# Patient Record
Sex: FEMALE | Race: WHITE | Employment: FULL TIME | URBAN - METROPOLITAN AREA
[De-identification: names, ages, dates, MRNs, and addresses within clinical notes are randomized per-mention and may not be internally consistent; named-entity substitution may affect disease eponyms.]

---

## 2017-07-05 ENCOUNTER — OFFICE VISIT (OUTPATIENT)
Dept: FAMILY MEDICINE CLINIC | Age: 32
End: 2017-07-05

## 2017-07-05 VITALS
SYSTOLIC BLOOD PRESSURE: 120 MMHG | HEART RATE: 100 BPM | DIASTOLIC BLOOD PRESSURE: 82 MMHG | RESPIRATION RATE: 14 BRPM | OXYGEN SATURATION: 97 % | BODY MASS INDEX: 30.22 KG/M2 | WEIGHT: 177 LBS | HEIGHT: 64 IN

## 2017-07-05 DIAGNOSIS — H57.89 IRRITATION OF EYE: Primary | ICD-10-CM

## 2017-07-05 PROCEDURE — G8427 DOCREV CUR MEDS BY ELIG CLIN: HCPCS | Performed by: PHYSICIAN ASSISTANT

## 2017-07-05 PROCEDURE — 99212 OFFICE O/P EST SF 10 MIN: CPT | Performed by: PHYSICIAN ASSISTANT

## 2017-07-05 PROCEDURE — 4004F PT TOBACCO SCREEN RCVD TLK: CPT | Performed by: PHYSICIAN ASSISTANT

## 2017-07-05 PROCEDURE — G8417 CALC BMI ABV UP PARAM F/U: HCPCS | Performed by: PHYSICIAN ASSISTANT

## 2017-07-05 RX ORDER — SULFACETAMIDE SODIUM 100 MG/ML
2 SOLUTION/ DROPS OPHTHALMIC 3 TIMES DAILY
Qty: 1 BOTTLE | Refills: 0 | Status: SHIPPED | OUTPATIENT
Start: 2017-07-05 | End: 2017-07-15

## 2017-07-05 ASSESSMENT — ENCOUNTER SYMPTOMS
EYE PAIN: 1
BLURRED VISION: 0
EYE DISCHARGE: 1
DOUBLE VISION: 0
FOREIGN BODY SENSATION: 0
EYE REDNESS: 1

## 2019-02-01 ENCOUNTER — OFFICE VISIT (OUTPATIENT)
Dept: FAMILY MEDICINE CLINIC | Age: 34
End: 2019-02-01
Payer: COMMERCIAL

## 2019-02-01 VITALS
DIASTOLIC BLOOD PRESSURE: 84 MMHG | WEIGHT: 189 LBS | SYSTOLIC BLOOD PRESSURE: 118 MMHG | BODY MASS INDEX: 32.27 KG/M2 | OXYGEN SATURATION: 99 % | TEMPERATURE: 97.7 F | HEART RATE: 82 BPM | HEIGHT: 64 IN

## 2019-02-01 DIAGNOSIS — Z13.220 LIPID SCREENING: ICD-10-CM

## 2019-02-01 DIAGNOSIS — Z00.00 ENCOUNTER FOR PREVENTIVE HEALTH EXAMINATION: ICD-10-CM

## 2019-02-01 DIAGNOSIS — R19.02 ABDOMINAL WALL MASS OF LEFT UPPER QUADRANT: ICD-10-CM

## 2019-02-01 DIAGNOSIS — N92.6 ABNORMAL MENSES: ICD-10-CM

## 2019-02-01 DIAGNOSIS — E66.9 CLASS 1 OBESITY WITHOUT SERIOUS COMORBIDITY WITH BODY MASS INDEX (BMI) OF 32.0 TO 32.9 IN ADULT, UNSPECIFIED OBESITY TYPE: Primary | ICD-10-CM

## 2019-02-01 DIAGNOSIS — Z13.1 DIABETES MELLITUS SCREENING: ICD-10-CM

## 2019-02-01 DIAGNOSIS — B00.1 COLD SORE: ICD-10-CM

## 2019-02-01 DIAGNOSIS — Z83.3 FAMILY HISTORY OF DIABETES MELLITUS (DM): ICD-10-CM

## 2019-02-01 PROCEDURE — 1036F TOBACCO NON-USER: CPT | Performed by: NURSE PRACTITIONER

## 2019-02-01 PROCEDURE — G8427 DOCREV CUR MEDS BY ELIG CLIN: HCPCS | Performed by: NURSE PRACTITIONER

## 2019-02-01 PROCEDURE — G8484 FLU IMMUNIZE NO ADMIN: HCPCS | Performed by: NURSE PRACTITIONER

## 2019-02-01 PROCEDURE — 99213 OFFICE O/P EST LOW 20 MIN: CPT | Performed by: NURSE PRACTITIONER

## 2019-02-01 PROCEDURE — G8417 CALC BMI ABV UP PARAM F/U: HCPCS | Performed by: NURSE PRACTITIONER

## 2019-02-01 RX ORDER — ACYCLOVIR 50 MG/G
OINTMENT TOPICAL
Qty: 1 TUBE | Refills: 1 | Status: SHIPPED | OUTPATIENT
Start: 2019-02-01 | End: 2019-02-08

## 2019-02-01 RX ORDER — L. ACIDOPHILUS/BIFID. ANIMALIS 2B CELL
CAPSULE ORAL
COMMUNITY

## 2019-02-01 ASSESSMENT — PATIENT HEALTH QUESTIONNAIRE - PHQ9
SUM OF ALL RESPONSES TO PHQ QUESTIONS 1-9: 0
SUM OF ALL RESPONSES TO PHQ QUESTIONS 1-9: 0
2. FEELING DOWN, DEPRESSED OR HOPELESS: 0
SUM OF ALL RESPONSES TO PHQ9 QUESTIONS 1 & 2: 0
1. LITTLE INTEREST OR PLEASURE IN DOING THINGS: 0

## 2019-02-01 ASSESSMENT — ENCOUNTER SYMPTOMS
COUGH: 0
SHORTNESS OF BREATH: 0
ABDOMINAL PAIN: 0

## 2019-02-07 DIAGNOSIS — Z13.220 LIPID SCREENING: ICD-10-CM

## 2019-02-07 DIAGNOSIS — Z13.1 DIABETES MELLITUS SCREENING: ICD-10-CM

## 2019-02-07 DIAGNOSIS — Z83.3 FAMILY HISTORY OF DIABETES MELLITUS (DM): ICD-10-CM

## 2019-02-07 DIAGNOSIS — N92.6 ABNORMAL MENSES: ICD-10-CM

## 2019-02-07 DIAGNOSIS — Z00.00 ENCOUNTER FOR PREVENTIVE HEALTH EXAMINATION: ICD-10-CM

## 2019-02-07 DIAGNOSIS — E66.9 CLASS 1 OBESITY WITHOUT SERIOUS COMORBIDITY WITH BODY MASS INDEX (BMI) OF 32.0 TO 32.9 IN ADULT, UNSPECIFIED OBESITY TYPE: ICD-10-CM

## 2019-02-07 LAB
ALBUMIN SERPL-MCNC: 4.3 G/DL (ref 3.5–4.6)
ALP BLD-CCNC: 52 U/L (ref 40–130)
ALT SERPL-CCNC: 18 U/L (ref 0–33)
ANION GAP SERPL CALCULATED.3IONS-SCNC: 16 MEQ/L (ref 9–15)
AST SERPL-CCNC: 17 U/L (ref 0–35)
BASOPHILS ABSOLUTE: 0 K/UL (ref 0–0.2)
BASOPHILS RELATIVE PERCENT: 0.4 %
BILIRUB SERPL-MCNC: 0.3 MG/DL (ref 0.2–0.7)
BUN BLDV-MCNC: 18 MG/DL (ref 6–20)
CALCIUM SERPL-MCNC: 9.1 MG/DL (ref 8.5–9.9)
CHLORIDE BLD-SCNC: 102 MEQ/L (ref 95–107)
CHOLESTEROL, TOTAL: 134 MG/DL (ref 0–199)
CO2: 22 MEQ/L (ref 20–31)
CREAT SERPL-MCNC: 0.74 MG/DL (ref 0.5–0.9)
EOSINOPHILS ABSOLUTE: 0.1 K/UL (ref 0–0.7)
EOSINOPHILS RELATIVE PERCENT: 2.3 %
GFR AFRICAN AMERICAN: >60
GFR NON-AFRICAN AMERICAN: >60
GLOBULIN: 2.5 G/DL (ref 2.3–3.5)
GLUCOSE BLD-MCNC: 77 MG/DL (ref 70–99)
HBA1C MFR BLD: 5.5 % (ref 4.8–5.9)
HCT VFR BLD CALC: 40.4 % (ref 37–47)
HDLC SERPL-MCNC: 55 MG/DL (ref 40–59)
HEMOGLOBIN: 13.4 G/DL (ref 12–16)
LDL CHOLESTEROL CALCULATED: 55 MG/DL (ref 0–129)
LYMPHOCYTES ABSOLUTE: 1.7 K/UL (ref 1–4.8)
LYMPHOCYTES RELATIVE PERCENT: 32.8 %
MCH RBC QN AUTO: 28.8 PG (ref 27–31.3)
MCHC RBC AUTO-ENTMCNC: 33.1 % (ref 33–37)
MCV RBC AUTO: 86.9 FL (ref 82–100)
MONOCYTES ABSOLUTE: 0.3 K/UL (ref 0.2–0.8)
MONOCYTES RELATIVE PERCENT: 5.8 %
NEUTROPHILS ABSOLUTE: 3 K/UL (ref 1.4–6.5)
NEUTROPHILS RELATIVE PERCENT: 58.7 %
PDW BLD-RTO: 13.8 % (ref 11.5–14.5)
PLATELET # BLD: 165 K/UL (ref 130–400)
POTASSIUM SERPL-SCNC: 4.1 MEQ/L (ref 3.4–4.9)
RBC # BLD: 4.65 M/UL (ref 4.2–5.4)
SODIUM BLD-SCNC: 140 MEQ/L (ref 135–144)
TOTAL PROTEIN: 6.8 G/DL (ref 6.3–8)
TRIGL SERPL-MCNC: 119 MG/DL (ref 0–150)
TSH REFLEX: 1.61 UIU/ML (ref 0.44–3.86)
WBC # BLD: 5.2 K/UL (ref 4.8–10.8)

## 2019-02-08 ENCOUNTER — OFFICE VISIT (OUTPATIENT)
Dept: SURGERY | Age: 34
End: 2019-02-08
Payer: COMMERCIAL

## 2019-02-08 VITALS
DIASTOLIC BLOOD PRESSURE: 80 MMHG | SYSTOLIC BLOOD PRESSURE: 110 MMHG | TEMPERATURE: 99 F | WEIGHT: 190.2 LBS | HEIGHT: 64 IN | BODY MASS INDEX: 32.47 KG/M2

## 2019-02-08 DIAGNOSIS — D17.1 LIPOMA OF CHEST WALL: Primary | ICD-10-CM

## 2019-02-08 PROCEDURE — G8484 FLU IMMUNIZE NO ADMIN: HCPCS | Performed by: SURGERY

## 2019-02-08 PROCEDURE — 1036F TOBACCO NON-USER: CPT | Performed by: SURGERY

## 2019-02-08 PROCEDURE — G8417 CALC BMI ABV UP PARAM F/U: HCPCS | Performed by: SURGERY

## 2019-02-08 PROCEDURE — 99202 OFFICE O/P NEW SF 15 MIN: CPT | Performed by: SURGERY

## 2019-02-08 PROCEDURE — G8427 DOCREV CUR MEDS BY ELIG CLIN: HCPCS | Performed by: SURGERY

## 2019-02-08 ASSESSMENT — ENCOUNTER SYMPTOMS
ABDOMINAL DISTENTION: 0
RECTAL PAIN: 0
ABDOMINAL PAIN: 0
CHEST TIGHTNESS: 0
ALLERGIC/IMMUNOLOGIC NEGATIVE: 1
RHINORRHEA: 0
NAUSEA: 0
SHORTNESS OF BREATH: 0
BLOOD IN STOOL: 0
COLOR CHANGE: 0

## 2019-04-01 ENCOUNTER — PREP FOR PROCEDURE (OUTPATIENT)
Dept: SURGERY | Age: 34
End: 2019-04-01

## 2019-04-01 ENCOUNTER — HOSPITAL ENCOUNTER (OUTPATIENT)
Dept: PREADMISSION TESTING | Age: 34
Discharge: HOME | End: 2019-04-05
Payer: COMMERCIAL

## 2019-04-01 ENCOUNTER — OFFICE VISIT (OUTPATIENT)
Dept: SURGERY | Age: 34
End: 2019-04-01
Payer: COMMERCIAL

## 2019-04-01 VITALS
HEART RATE: 83 BPM | BODY MASS INDEX: 32.44 KG/M2 | HEIGHT: 64 IN | DIASTOLIC BLOOD PRESSURE: 80 MMHG | RESPIRATION RATE: 16 BRPM | WEIGHT: 190 LBS | SYSTOLIC BLOOD PRESSURE: 118 MMHG | OXYGEN SATURATION: 99 % | TEMPERATURE: 98 F

## 2019-04-01 VITALS
WEIGHT: 190 LBS | BODY MASS INDEX: 32.44 KG/M2 | HEIGHT: 64 IN | DIASTOLIC BLOOD PRESSURE: 80 MMHG | TEMPERATURE: 98 F | SYSTOLIC BLOOD PRESSURE: 118 MMHG

## 2019-04-01 DIAGNOSIS — D17.1 LIPOMA OF CHEST WALL: Primary | ICD-10-CM

## 2019-04-01 LAB
HCT VFR BLD CALC: 37 % (ref 37–47)
HEMOGLOBIN: 12.4 G/DL (ref 12–16)
MCH RBC QN AUTO: 29.2 PG (ref 27–31.3)
MCHC RBC AUTO-ENTMCNC: 33.6 % (ref 33–37)
MCV RBC AUTO: 86.8 FL (ref 82–100)
PDW BLD-RTO: 13.4 % (ref 11.5–14.5)
PLATELET # BLD: 181 K/UL (ref 130–400)
RBC # BLD: 4.26 M/UL (ref 4.2–5.4)
WBC # BLD: 5.3 K/UL (ref 4.8–10.8)

## 2019-04-01 PROCEDURE — 99213 OFFICE O/P EST LOW 20 MIN: CPT | Performed by: SURGERY

## 2019-04-01 PROCEDURE — 1036F TOBACCO NON-USER: CPT | Performed by: SURGERY

## 2019-04-01 PROCEDURE — G8427 DOCREV CUR MEDS BY ELIG CLIN: HCPCS | Performed by: SURGERY

## 2019-04-01 PROCEDURE — G8417 CALC BMI ABV UP PARAM F/U: HCPCS | Performed by: SURGERY

## 2019-04-01 PROCEDURE — 85027 COMPLETE CBC AUTOMATED: CPT

## 2019-04-01 RX ORDER — LIDOCAINE HYDROCHLORIDE 10 MG/ML
1 INJECTION, SOLUTION EPIDURAL; INFILTRATION; INTRACAUDAL; PERINEURAL
Status: CANCELLED | OUTPATIENT
Start: 2019-04-05 | End: 2019-04-05

## 2019-04-01 RX ORDER — SODIUM CHLORIDE 0.9 % (FLUSH) 0.9 %
10 SYRINGE (ML) INJECTION PRN
Status: CANCELLED | OUTPATIENT
Start: 2019-04-05

## 2019-04-01 RX ORDER — SODIUM CHLORIDE, SODIUM LACTATE, POTASSIUM CHLORIDE, CALCIUM CHLORIDE 600; 310; 30; 20 MG/100ML; MG/100ML; MG/100ML; MG/100ML
INJECTION, SOLUTION INTRAVENOUS CONTINUOUS
Status: CANCELLED | OUTPATIENT
Start: 2019-04-05

## 2019-04-01 RX ORDER — CEFAZOLIN SODIUM 2 G/50ML
2 SOLUTION INTRAVENOUS ONCE
Status: CANCELLED | OUTPATIENT
Start: 2019-04-05

## 2019-04-01 RX ORDER — SODIUM CHLORIDE 0.9 % (FLUSH) 0.9 %
10 SYRINGE (ML) INJECTION EVERY 12 HOURS SCHEDULED
Status: CANCELLED | OUTPATIENT
Start: 2019-04-05

## 2019-04-01 RX ORDER — KETOROLAC TROMETHAMINE 30 MG/ML
30 INJECTION, SOLUTION INTRAMUSCULAR; INTRAVENOUS ONCE
Status: CANCELLED | OUTPATIENT
Start: 2019-04-05 | End: 2019-04-09

## 2019-04-01 ASSESSMENT — ENCOUNTER SYMPTOMS
ALLERGIC/IMMUNOLOGIC NEGATIVE: 1
ABDOMINAL PAIN: 0
CHEST TIGHTNESS: 0
RHINORRHEA: 0
BLOOD IN STOOL: 0
RECTAL PAIN: 0
NAUSEA: 0
ABDOMINAL DISTENTION: 0
SHORTNESS OF BREATH: 0
COLOR CHANGE: 0

## 2019-04-01 NOTE — PROGRESS NOTES
Subjective:      Patient ID: Wayne Olivares is a 34 y.o. female. HPI  Wayne Olivares is a 34 y.o. female seen for a pre op visit for a soft tissue lesion of the left anterior rib cage. It has been there for 2+ years and has been getting larger. It is painful. The patient admits to infection/inflammation. There is not a history of previous surgery at this site. The patient  has similar lesion but smaller on the right side. Testing has been done with mammography and ultrasound that was normal.     Review of Systems   Constitutional: Negative for activity change, appetite change and unexpected weight change. HENT: Negative for congestion, nosebleeds, rhinorrhea and sneezing. Eyes: Negative for visual disturbance. Respiratory: Negative for chest tightness and shortness of breath. Cardiovascular: Negative for chest pain and leg swelling. Gastrointestinal: Negative for abdominal distention, abdominal pain, blood in stool, nausea and rectal pain. Endocrine: Negative. Genitourinary: Negative for difficulty urinating. Musculoskeletal: Negative. Skin: Negative for color change. Allergic/Immunologic: Negative. Neurological: Negative for seizures, light-headedness, numbness and headaches. Hematological: Does not bruise/bleed easily. Psychiatric/Behavioral: Negative for sleep disturbance. Past Medical History:   Diagnosis Date    GERD (gastroesophageal reflux disease)      Past Surgical History:   Procedure Laterality Date    OTHER SURGICAL HISTORY  2012    mole removal on back left side     WISDOM TOOTH EXTRACTION       Social History     Tobacco Use    Smoking status: Former Smoker     Packs/day: 0.25     Years: 9.00     Pack years: 2.25     Types: Cigarettes     Last attempt to quit: 2017     Years since quittin.6    Smokeless tobacco: Never Used    Tobacco comment: 4 cigs daily  2/4/15   Substance Use Topics    Alcohol use: Yes     Comment: occassional    Drug use:  No Family History   Problem Relation Age of Onset    High Blood Pressure Mother     No Known Problems Father     Cancer Paternal Aunt         rigoberto lymp    Heart Disease Maternal Grandfather         bi pass    Stroke Maternal Grandfather         8/2012 mini stroke    Diabetes Maternal Grandfather     Cancer Paternal Grandmother         lymphomia    Diabetes Paternal Grandfather      Dextromethorphan-guaifenesin  Allergies   Allergen Reactions    Dextromethorphan-Guaifenesin Other (See Comments)     Weak, shaky     Current Outpatient Medications   Medication Sig Dispense Refill    Multiple Vitamins-Minerals (ONE-A-DAY WOMENS VITACRAVES) CHEW Take by mouth      Lansoprazole (PREVACID PO) Take 1 tablet by mouth daily. No current facility-administered medications for this visit. /80   Temp 98 °F (36.7 °C) (Temporal)   Ht 5' 4\" (1.626 m)   Wt 190 lb (86.2 kg)   LMP 03/23/2019   BMI 32.61 kg/m²     Objective:   Physical Exam   Constitutional: She is oriented to person, place, and time. She appears well-developed and well-nourished. No distress. HENT:   Mouth/Throat: Oropharynx is clear and moist.   Eyes: Pupils are equal, round, and reactive to light. Neck: No tracheal deviation present. No thyromegaly present. Cardiovascular: Normal rate, regular rhythm and normal heart sounds. Pulmonary/Chest: Effort normal and breath sounds normal. No respiratory distress. Musculoskeletal:   Normal gait   Lymphadenopathy:     She has no cervical adenopathy. She has no axillary adenopathy. Right: No supraclavicular adenopathy present. Left: No supraclavicular adenopathy present. Neurological: She is alert and oriented to person, place, and time. Skin: No bruising, no lesion and no rash noted. Psychiatric: She has a normal mood and affect.  Judgment normal.     /80   Temp 98 °F (36.7 °C) (Temporal)   Ht 5' 4\" (1.626 m)   Wt 190 lb (86.2 kg)   LMP 03/23/2019   BMI 32.61 kg/m²   Assessment:      Left anterior chest wall lipoma      Plan:      Excision of left chest wall mass       The options of therapy were discussed. The procedure of the excision as well as potential risks and complications were discussed including but not exclusive to recurrence, infection, difficulty with wound healing and blood loss. She understands and is agreeable to the surgery.        Rudi Be MD

## 2019-04-02 NOTE — H&P (VIEW-ONLY)
GRANT Reyes is a 34 y.o. female seen for a pre op visit for a soft tissue lesion of the left anterior rib cage. It has been there for 2+ years and has been getting larger. It is painful. The patient admits to infection/inflammation. There is not a history of previous surgery at this site. The patient  has similar lesion but smaller on the right side. Testing has been done with mammography and ultrasound that was normal.     Review of Systems   Constitutional: Negative for activity change, appetite change and unexpected weight change. HENT: Negative for congestion, nosebleeds, rhinorrhea and sneezing. Eyes: Negative for visual disturbance. Respiratory: Negative for chest tightness and shortness of breath. Cardiovascular: Negative for chest pain and leg swelling. Gastrointestinal: Negative for abdominal distention, abdominal pain, blood in stool, nausea and rectal pain. Endocrine: Negative. Genitourinary: Negative for difficulty urinating. Musculoskeletal: Negative. Skin: Negative for color change. Allergic/Immunologic: Negative. Neurological: Negative for seizures, light-headedness, numbness and headaches. Hematological: Does not bruise/bleed easily. Psychiatric/Behavioral: Negative for sleep disturbance.      Past Medical History:   Diagnosis Date    GERD (gastroesophageal reflux disease)      Past Surgical History:   Procedure Laterality Date    OTHER SURGICAL HISTORY  2012    mole removal on back left side     WISDOM TOOTH EXTRACTION       Social History     Tobacco Use    Smoking status: Former Smoker     Packs/day: 0.25     Years: 9.00     Pack years: 2.25     Types: Cigarettes     Last attempt to quit: 2017     Years since quittin.6    Smokeless tobacco: Never Used    Tobacco comment: 4 cigs daily  2/4/15   Substance Use Topics    Alcohol use: Yes     Comment: occassional    Drug use: No     Family History   Problem Relation Age of Onset    High Blood Pressure Mother     No Known Problems Father     Cancer Paternal Aunt         rigoberto lymp    Heart Disease Maternal Grandfather         bi pass    Stroke Maternal Grandfather         8/2012 mini stroke    Diabetes Maternal Grandfather     Cancer Paternal Grandmother         lymphomia    Diabetes Paternal Grandfather      Dextromethorphan-guaifenesin  Allergies   Allergen Reactions    Dextromethorphan-Guaifenesin Other (See Comments)     Weak, shaky     Current Outpatient Medications   Medication Sig Dispense Refill    Multiple Vitamins-Minerals (ONE-A-DAY WOMENS VITACRAVES) CHEW Take by mouth      Lansoprazole (PREVACID PO) Take 1 tablet by mouth daily. No current facility-administered medications for this visit. /80   Temp 98 °F (36.7 °C) (Temporal)   Ht 5' 4\" (1.626 m)   Wt 190 lb (86.2 kg)   LMP 03/23/2019   BMI 32.61 kg/m²     Objective:   Physical Exam   Constitutional: She is oriented to person, place, and time. She appears well-developed and well-nourished. No distress. HENT:   Mouth/Throat: Oropharynx is clear and moist.   Eyes: Pupils are equal, round, and reactive to light. Neck: No tracheal deviation present. No thyromegaly present. Cardiovascular: Normal rate, regular rhythm and normal heart sounds. Pulmonary/Chest: Effort normal and breath sounds normal. No respiratory distress. Musculoskeletal:   Normal gait   Lymphadenopathy:     She has no cervical adenopathy. She has no axillary adenopathy. Right: No supraclavicular adenopathy present. Left: No supraclavicular adenopathy present. Neurological: She is alert and oriented to person, place, and time. Skin: No bruising, no lesion and no rash noted. Psychiatric: She has a normal mood and affect.  Judgment normal.     /80   Temp 98 °F (36.7 °C) (Temporal)   Ht 5' 4\" (1.626 m)   Wt 190 lb (86.2 kg)   LMP 03/23/2019   BMI 32.61 kg/m²   Assessment:      Left anterior chest wall lipoma      Plan:      Excision of left chest wall mass       The options of therapy were discussed. The procedure of the excision as well as potential risks and complications were discussed including but not exclusive to recurrence, infection, difficulty with wound healing and blood loss. She understands and is agreeable to the surgery.        Tracey Connell MD

## 2019-04-04 ENCOUNTER — ANESTHESIA EVENT (OUTPATIENT)
Dept: OPERATING ROOM | Age: 34
End: 2019-04-04
Payer: COMMERCIAL

## 2019-04-04 NOTE — ANESTHESIA PRE PROCEDURE
Department of Anesthesiology  Preprocedure Note       Name:  Ricco Ray   Age:  34 y.o.  :  1985                                          MRN:  [id number]         Date:  2019      Surgeon: Swetha Barrios):  Palma Vu MD    Procedure: EXCISION OF CHEST WALL MASS (N/A )    Medications prior to admission:   Prior to Admission medications    Medication Sig Start Date End Date Taking? Authorizing Provider   Multiple Vitamins-Minerals (ONE-A-DAY WOMENS VITACRAVES) CHEW Take by mouth    Historical Provider, MD   Lansoprazole (PREVACID PO) Take 1 tablet by mouth daily. Historical Provider, MD       Current medications:    Current Facility-Administered Medications   Medication Dose Route Frequency Provider Last Rate Last Dose    lactated ringers infusion   Intravenous Continuous Cristiana Page, APRN -  mL/hr at 19 0640      sodium chloride flush 0.9 % injection 10 mL  10 mL Intravenous 2 times per day Cristiana Page, APRN - CNP        sodium chloride flush 0.9 % injection 10 mL  10 mL Intravenous PRN Cristiana Page, APRN - CNP        ceFAZolin (ANCEF) 2 g in dextrose 3 % 50 mL IVPB (duplex)  2 g Intravenous Once Cristiana Page, APRN - CNP           Allergies:     Allergies   Allergen Reactions    Dextromethorphan-Guaifenesin Other (See Comments)     Weak, shaky       Problem List:    Patient Active Problem List   Diagnosis Code    Lipoma of chest wall D17.1       Past Medical History:        Diagnosis Date    GERD (gastroesophageal reflux disease)        Past Surgical History:        Procedure Laterality Date    OTHER SURGICAL HISTORY  2012    mole removal on back left side     WISDOM TOOTH EXTRACTION         Social History:    Social History     Tobacco Use    Smoking status: Former Smoker     Packs/day: 0.25     Years: 9.00     Pack years: 2.25     Types: Cigarettes     Last attempt to quit: 2017     Years since quittin.6    Smokeless tobacco: Never Used   [facility] Tobacco comment: 4 cigs daily  2/4/15   Substance Use Topics    Alcohol use: Yes     Comment: occassional                                Counseling given: Not Answered  Comment: 4 cigs daily  2/4/15      Vital Signs (Current):   Vitals:    04/05/19 0613   BP: 139/85   Pulse: 79   Resp: 16   Temp: 98 °F (36.7 °C)   TempSrc: Temporal   SpO2: 100%   Weight: 190 lb (86.2 kg)   Height: 5' 4\" (1.626 m)                                              BP Readings from Last 3 Encounters:   04/05/19 139/85   04/01/19 118/80   04/01/19 118/80       NPO Status: Time of last liquid consumption: 2130                        Time of last solid consumption: 2130                        Date of last liquid consumption: 04/04/19                        Date of last solid food consumption: 04/04/19    BMI:   Wt Readings from Last 3 Encounters:   04/05/19 190 lb (86.2 kg)   04/01/19 190 lb (86.2 kg)   04/01/19 190 lb (86.2 kg)     Body mass index is 32.61 kg/m². CBC:   Lab Results   Component Value Date    WBC 5.3 04/01/2019    RBC 4.26 04/01/2019    HGB 12.4 04/01/2019    HCT 37.0 04/01/2019    MCV 86.8 04/01/2019    RDW 13.4 04/01/2019     04/01/2019       CMP:   Lab Results   Component Value Date     02/07/2019    K 4.1 02/07/2019     02/07/2019    CO2 22 02/07/2019    BUN 18 02/07/2019    CREATININE 0.74 02/07/2019    GFRAA >60.0 02/07/2019    LABGLOM >60.0 02/07/2019    GLUCOSE 77 02/07/2019    PROT 6.8 02/07/2019    CALCIUM 9.1 02/07/2019    BILITOT 0.3 02/07/2019    ALKPHOS 52 02/07/2019    AST 17 02/07/2019    ALT 18 02/07/2019       POC Tests: No results for input(s): POCGLU, POCNA, POCK, POCCL, POCBUN, POCHEMO, POCHCT in the last 72 hours.     Coags: No results found for: PROTIME, INR, APTT    HCG (If Applicable): No results found for: PREGTESTUR, PREGSERUM, HCG, HCGQUANT     ABGs: No results found for: PHART, PO2ART, NMF7QUJ, EDW4KEE, BEART, M7QIABXH     Type & Screen (If Applicable):  No results found for: LABABO, [address]    Anesthesia Evaluation  Patient summary reviewed and Nursing notes reviewed no history of anesthetic complications:   Airway: Mallampati: II  TM distance: >3 FB   Neck ROM: full  Mouth opening: > = 3 FB Dental: normal exam         Pulmonary:Negative Pulmonary ROS and normal exam  breath sounds clear to auscultation                             Cardiovascular:Negative CV ROS  Exercise tolerance: good (>4 METS),         ECG reviewed  Rhythm: regular  Rate: normal           Beta Blocker:  Not on Beta Blocker         Neuro/Psych:   Negative Neuro/Psych ROS              GI/Hepatic/Renal:   (+) GERD:,           Endo/Other: Negative Endo/Other ROS             Pt had PAT visit. Abdominal:           Vascular:                                        Anesthesia Plan      general     ASA 1     (LMA)  Induction: intravenous. MIPS: Postoperative opioids intended and Prophylactic antiemetics administered. Anesthetic plan and risks discussed with patient. Plan discussed with CRNA.     Attending anesthesiologist reviewed and agrees with Roosevelt Rojas DO   4/5/2019

## 2019-04-05 ENCOUNTER — ANESTHESIA (OUTPATIENT)
Dept: OPERATING ROOM | Age: 34
End: 2019-04-05
Payer: COMMERCIAL

## 2019-04-05 ENCOUNTER — HOSPITAL ENCOUNTER (OUTPATIENT)
Age: 34
Setting detail: OUTPATIENT SURGERY
Discharge: HOME | End: 2019-04-05
Attending: SURGERY | Admitting: SURGERY
Payer: COMMERCIAL

## 2019-04-05 VITALS
OXYGEN SATURATION: 98 % | BODY MASS INDEX: 32.44 KG/M2 | WEIGHT: 190 LBS | RESPIRATION RATE: 20 BRPM | HEIGHT: 64 IN | HEART RATE: 67 BPM | SYSTOLIC BLOOD PRESSURE: 112 MMHG | TEMPERATURE: 97.6 F | DIASTOLIC BLOOD PRESSURE: 68 MMHG

## 2019-04-05 VITALS — DIASTOLIC BLOOD PRESSURE: 53 MMHG | SYSTOLIC BLOOD PRESSURE: 85 MMHG | TEMPERATURE: 95.4 F | OXYGEN SATURATION: 99 %

## 2019-04-05 DIAGNOSIS — D17.1 LIPOMA OF CHEST WALL: Primary | ICD-10-CM

## 2019-04-05 LAB
HCG, URINE, POC: NEGATIVE
Lab: NORMAL
NEGATIVE QC PASS/FAIL: NORMAL
POSITIVE QC PASS/FAIL: NORMAL

## 2019-04-05 PROCEDURE — 6370000000 HC RX 637 (ALT 250 FOR IP): Performed by: SURGERY

## 2019-04-05 PROCEDURE — 3600000013 HC SURGERY LEVEL 3 ADDTL 15MIN: Performed by: SURGERY

## 2019-04-05 PROCEDURE — 2580000003 HC RX 258: Performed by: NURSE PRACTITIONER

## 2019-04-05 PROCEDURE — 88304 TISSUE EXAM BY PATHOLOGIST: CPT

## 2019-04-05 PROCEDURE — 2500000003 HC RX 250 WO HCPCS: Performed by: SURGERY

## 2019-04-05 PROCEDURE — 2580000003 HC RX 258: Performed by: SURGERY

## 2019-04-05 PROCEDURE — 3600000003 HC SURGERY LEVEL 3 BASE: Performed by: SURGERY

## 2019-04-05 PROCEDURE — 2580000003 HC RX 258: Performed by: NURSE ANESTHETIST, CERTIFIED REGISTERED

## 2019-04-05 PROCEDURE — 6360000002 HC RX W HCPCS: Performed by: NURSE PRACTITIONER

## 2019-04-05 PROCEDURE — 3700000001 HC ADD 15 MINUTES (ANESTHESIA): Performed by: SURGERY

## 2019-04-05 PROCEDURE — 7100000000 HC PACU RECOVERY - FIRST 15 MIN: Performed by: SURGERY

## 2019-04-05 PROCEDURE — 2500000003 HC RX 250 WO HCPCS: Performed by: NURSE PRACTITIONER

## 2019-04-05 PROCEDURE — 6360000002 HC RX W HCPCS: Performed by: STUDENT IN AN ORGANIZED HEALTH CARE EDUCATION/TRAINING PROGRAM

## 2019-04-05 PROCEDURE — 6360000002 HC RX W HCPCS: Performed by: NURSE ANESTHETIST, CERTIFIED REGISTERED

## 2019-04-05 PROCEDURE — 7100000001 HC PACU RECOVERY - ADDTL 15 MIN: Performed by: SURGERY

## 2019-04-05 PROCEDURE — 21552 EXC NECK LES SC 3 CM/>: CPT | Performed by: SURGERY

## 2019-04-05 PROCEDURE — 7100000010 HC PHASE II RECOVERY - FIRST 15 MIN: Performed by: SURGERY

## 2019-04-05 PROCEDURE — 3700000000 HC ANESTHESIA ATTENDED CARE: Performed by: SURGERY

## 2019-04-05 PROCEDURE — 6370000000 HC RX 637 (ALT 250 FOR IP): Performed by: STUDENT IN AN ORGANIZED HEALTH CARE EDUCATION/TRAINING PROGRAM

## 2019-04-05 PROCEDURE — 2709999900 HC NON-CHARGEABLE SUPPLY: Performed by: SURGERY

## 2019-04-05 PROCEDURE — 7100000011 HC PHASE II RECOVERY - ADDTL 15 MIN: Performed by: SURGERY

## 2019-04-05 RX ORDER — SODIUM CHLORIDE 0.9 % (FLUSH) 0.9 %
10 SYRINGE (ML) INJECTION PRN
Status: DISCONTINUED | OUTPATIENT
Start: 2019-04-05 | End: 2019-04-05 | Stop reason: HOSPADM

## 2019-04-05 RX ORDER — HYDROCODONE BITARTRATE AND ACETAMINOPHEN 5; 325 MG/1; MG/1
2 TABLET ORAL EVERY 4 HOURS PRN
Status: DISCONTINUED | OUTPATIENT
Start: 2019-04-05 | End: 2019-04-05 | Stop reason: HOSPADM

## 2019-04-05 RX ORDER — MEPERIDINE HYDROCHLORIDE 25 MG/ML
12.5 INJECTION INTRAMUSCULAR; INTRAVENOUS; SUBCUTANEOUS EVERY 5 MIN PRN
Status: DISCONTINUED | OUTPATIENT
Start: 2019-04-05 | End: 2019-04-05 | Stop reason: HOSPADM

## 2019-04-05 RX ORDER — KETOROLAC TROMETHAMINE 30 MG/ML
30 INJECTION, SOLUTION INTRAMUSCULAR; INTRAVENOUS ONCE
Status: COMPLETED | OUTPATIENT
Start: 2019-04-05 | End: 2019-04-05

## 2019-04-05 RX ORDER — SODIUM CHLORIDE, SODIUM LACTATE, POTASSIUM CHLORIDE, CALCIUM CHLORIDE 600; 310; 30; 20 MG/100ML; MG/100ML; MG/100ML; MG/100ML
INJECTION, SOLUTION INTRAVENOUS CONTINUOUS
Status: DISCONTINUED | OUTPATIENT
Start: 2019-04-05 | End: 2019-04-05 | Stop reason: HOSPADM

## 2019-04-05 RX ORDER — METOCLOPRAMIDE HYDROCHLORIDE 5 MG/ML
10 INJECTION INTRAMUSCULAR; INTRAVENOUS
Status: DISCONTINUED | OUTPATIENT
Start: 2019-04-05 | End: 2019-04-05 | Stop reason: HOSPADM

## 2019-04-05 RX ORDER — CEFAZOLIN SODIUM 2 G/50ML
2 SOLUTION INTRAVENOUS ONCE
Status: COMPLETED | OUTPATIENT
Start: 2019-04-05 | End: 2019-04-05

## 2019-04-05 RX ORDER — FENTANYL CITRATE 50 UG/ML
50 INJECTION, SOLUTION INTRAMUSCULAR; INTRAVENOUS EVERY 10 MIN PRN
Status: DISCONTINUED | OUTPATIENT
Start: 2019-04-05 | End: 2019-04-05 | Stop reason: HOSPADM

## 2019-04-05 RX ORDER — HYDROCODONE BITARTRATE AND ACETAMINOPHEN 5; 325 MG/1; MG/1
2 TABLET ORAL PRN
Status: COMPLETED | OUTPATIENT
Start: 2019-04-05 | End: 2019-04-05

## 2019-04-05 RX ORDER — SODIUM CHLORIDE 0.9 % (FLUSH) 0.9 %
10 SYRINGE (ML) INJECTION EVERY 12 HOURS SCHEDULED
Status: DISCONTINUED | OUTPATIENT
Start: 2019-04-05 | End: 2019-04-05 | Stop reason: HOSPADM

## 2019-04-05 RX ORDER — FENTANYL CITRATE 50 UG/ML
INJECTION, SOLUTION INTRAMUSCULAR; INTRAVENOUS PRN
Status: DISCONTINUED | OUTPATIENT
Start: 2019-04-05 | End: 2019-04-05 | Stop reason: SDUPTHER

## 2019-04-05 RX ORDER — DIPHENHYDRAMINE HYDROCHLORIDE 50 MG/ML
12.5 INJECTION INTRAMUSCULAR; INTRAVENOUS
Status: DISCONTINUED | OUTPATIENT
Start: 2019-04-05 | End: 2019-04-05 | Stop reason: HOSPADM

## 2019-04-05 RX ORDER — MORPHINE SULFATE 2 MG/ML
1 INJECTION, SOLUTION INTRAMUSCULAR; INTRAVENOUS
Status: DISCONTINUED | OUTPATIENT
Start: 2019-04-05 | End: 2019-04-05 | Stop reason: HOSPADM

## 2019-04-05 RX ORDER — BUPIVACAINE HYDROCHLORIDE AND EPINEPHRINE 5; 5 MG/ML; UG/ML
INJECTION, SOLUTION EPIDURAL; INTRACAUDAL; PERINEURAL PRN
Status: DISCONTINUED | OUTPATIENT
Start: 2019-04-05 | End: 2019-04-05 | Stop reason: ALTCHOICE

## 2019-04-05 RX ORDER — DEXAMETHASONE SODIUM PHOSPHATE 10 MG/ML
INJECTION INTRAMUSCULAR; INTRAVENOUS PRN
Status: DISCONTINUED | OUTPATIENT
Start: 2019-04-05 | End: 2019-04-05 | Stop reason: SDUPTHER

## 2019-04-05 RX ORDER — MAGNESIUM HYDROXIDE 1200 MG/15ML
LIQUID ORAL CONTINUOUS PRN
Status: COMPLETED | OUTPATIENT
Start: 2019-04-05 | End: 2019-04-05

## 2019-04-05 RX ORDER — HYDROCODONE BITARTRATE AND ACETAMINOPHEN 5; 325 MG/1; MG/1
1 TABLET ORAL EVERY 4 HOURS PRN
Status: DISCONTINUED | OUTPATIENT
Start: 2019-04-05 | End: 2019-04-05 | Stop reason: HOSPADM

## 2019-04-05 RX ORDER — PROPOFOL 10 MG/ML
INJECTION, EMULSION INTRAVENOUS PRN
Status: DISCONTINUED | OUTPATIENT
Start: 2019-04-05 | End: 2019-04-05 | Stop reason: SDUPTHER

## 2019-04-05 RX ORDER — SODIUM CHLORIDE 9 MG/ML
INJECTION, SOLUTION INTRAVENOUS CONTINUOUS PRN
Status: DISCONTINUED | OUTPATIENT
Start: 2019-04-05 | End: 2019-04-05 | Stop reason: SDUPTHER

## 2019-04-05 RX ORDER — LIDOCAINE HYDROCHLORIDE 10 MG/ML
1 INJECTION, SOLUTION EPIDURAL; INFILTRATION; INTRACAUDAL; PERINEURAL
Status: COMPLETED | OUTPATIENT
Start: 2019-04-05 | End: 2019-04-05

## 2019-04-05 RX ORDER — HYDROCODONE BITARTRATE AND ACETAMINOPHEN 5; 325 MG/1; MG/1
1 TABLET ORAL EVERY 6 HOURS PRN
Qty: 25 TABLET | Refills: 0 | Status: SHIPPED | OUTPATIENT
Start: 2019-04-05 | End: 2019-04-12

## 2019-04-05 RX ORDER — HYDROCODONE BITARTRATE AND ACETAMINOPHEN 5; 325 MG/1; MG/1
1 TABLET ORAL PRN
Status: COMPLETED | OUTPATIENT
Start: 2019-04-05 | End: 2019-04-05

## 2019-04-05 RX ORDER — ONDANSETRON 2 MG/ML
4 INJECTION INTRAMUSCULAR; INTRAVENOUS
Status: DISCONTINUED | OUTPATIENT
Start: 2019-04-05 | End: 2019-04-05 | Stop reason: HOSPADM

## 2019-04-05 RX ORDER — ONDANSETRON 2 MG/ML
INJECTION INTRAMUSCULAR; INTRAVENOUS PRN
Status: DISCONTINUED | OUTPATIENT
Start: 2019-04-05 | End: 2019-04-05 | Stop reason: SDUPTHER

## 2019-04-05 RX ORDER — MIDAZOLAM HYDROCHLORIDE 1 MG/ML
INJECTION INTRAMUSCULAR; INTRAVENOUS PRN
Status: DISCONTINUED | OUTPATIENT
Start: 2019-04-05 | End: 2019-04-05 | Stop reason: SDUPTHER

## 2019-04-05 RX ADMIN — HYDROCODONE BITARTRATE AND ACETAMINOPHEN 1 TABLET: 5; 325 TABLET ORAL at 09:44

## 2019-04-05 RX ADMIN — FENTANYL CITRATE 25 MCG: 50 INJECTION, SOLUTION INTRAMUSCULAR; INTRAVENOUS at 08:11

## 2019-04-05 RX ADMIN — PROPOFOL 180 MG: 10 INJECTION, EMULSION INTRAVENOUS at 07:32

## 2019-04-05 RX ADMIN — ONDANSETRON 4 MG: 2 INJECTION INTRAMUSCULAR; INTRAVENOUS at 08:13

## 2019-04-05 RX ADMIN — LIDOCAINE HYDROCHLORIDE 1 ML: 10 INJECTION, SOLUTION EPIDURAL; INFILTRATION; INTRACAUDAL; PERINEURAL at 06:40

## 2019-04-05 RX ADMIN — SODIUM CHLORIDE, POTASSIUM CHLORIDE, SODIUM LACTATE AND CALCIUM CHLORIDE: 600; 310; 30; 20 INJECTION, SOLUTION INTRAVENOUS at 06:40

## 2019-04-05 RX ADMIN — MEPERIDINE HYDROCHLORIDE 12.5 MG: 25 INJECTION INTRAMUSCULAR; INTRAVENOUS; SUBCUTANEOUS at 08:57

## 2019-04-05 RX ADMIN — FENTANYL CITRATE 25 MCG: 50 INJECTION, SOLUTION INTRAMUSCULAR; INTRAVENOUS at 07:41

## 2019-04-05 RX ADMIN — SODIUM CHLORIDE, POTASSIUM CHLORIDE, SODIUM LACTATE AND CALCIUM CHLORIDE: 600; 310; 30; 20 INJECTION, SOLUTION INTRAVENOUS at 07:29

## 2019-04-05 RX ADMIN — HYDROCODONE BITARTRATE AND ACETAMINOPHEN 1 TABLET: 5; 325 TABLET ORAL at 09:16

## 2019-04-05 RX ADMIN — CEFAZOLIN SODIUM 2 G: 2 SOLUTION INTRAVENOUS at 07:36

## 2019-04-05 RX ADMIN — FENTANYL CITRATE 25 MCG: 50 INJECTION, SOLUTION INTRAMUSCULAR; INTRAVENOUS at 07:36

## 2019-04-05 RX ADMIN — MIDAZOLAM HYDROCHLORIDE 2 MG: 1 INJECTION, SOLUTION INTRAMUSCULAR; INTRAVENOUS at 07:29

## 2019-04-05 RX ADMIN — KETOROLAC TROMETHAMINE 30 MG: 30 INJECTION, SOLUTION INTRAMUSCULAR; INTRAVENOUS at 06:40

## 2019-04-05 RX ADMIN — FENTANYL CITRATE 25 MCG: 50 INJECTION, SOLUTION INTRAMUSCULAR; INTRAVENOUS at 07:52

## 2019-04-05 RX ADMIN — SODIUM CHLORIDE: 9 INJECTION, SOLUTION INTRAVENOUS at 08:15

## 2019-04-05 RX ADMIN — LIDOCAINE HYDROCHLORIDE 80 MG: 20 INJECTION, SOLUTION INTRAVENOUS at 07:32

## 2019-04-05 RX ADMIN — DEXAMETHASONE SODIUM PHOSPHATE 10 MG: 10 INJECTION INTRAMUSCULAR; INTRAVENOUS at 07:37

## 2019-04-05 ASSESSMENT — PULMONARY FUNCTION TESTS
PIF_VALUE: 1
PIF_VALUE: 2
PIF_VALUE: 3
PIF_VALUE: 10
PIF_VALUE: 1
PIF_VALUE: 6
PIF_VALUE: 3
PIF_VALUE: 10
PIF_VALUE: 1
PIF_VALUE: 12
PIF_VALUE: 16
PIF_VALUE: 12
PIF_VALUE: 10
PIF_VALUE: 12
PIF_VALUE: 12
PIF_VALUE: 1
PIF_VALUE: 10
PIF_VALUE: 1
PIF_VALUE: 10
PIF_VALUE: 10
PIF_VALUE: 11
PIF_VALUE: 1
PIF_VALUE: 10
PIF_VALUE: 6
PIF_VALUE: 6
PIF_VALUE: 2
PIF_VALUE: 3
PIF_VALUE: 8
PIF_VALUE: 3
PIF_VALUE: 12
PIF_VALUE: 15
PIF_VALUE: 10
PIF_VALUE: 7
PIF_VALUE: 10
PIF_VALUE: 8
PIF_VALUE: 12
PIF_VALUE: 8
PIF_VALUE: 1
PIF_VALUE: 10
PIF_VALUE: 10
PIF_VALUE: 9
PIF_VALUE: 0
PIF_VALUE: 10
PIF_VALUE: 12
PIF_VALUE: 12
PIF_VALUE: 10
PIF_VALUE: 23
PIF_VALUE: 3
PIF_VALUE: 3
PIF_VALUE: 12
PIF_VALUE: 10
PIF_VALUE: 3
PIF_VALUE: 3
PIF_VALUE: 10
PIF_VALUE: 10
PIF_VALUE: 12
PIF_VALUE: 8
PIF_VALUE: 2
PIF_VALUE: 3
PIF_VALUE: 9
PIF_VALUE: 12
PIF_VALUE: 10

## 2019-04-05 ASSESSMENT — PAIN SCALES - GENERAL
PAINLEVEL_OUTOF10: 4
PAINLEVEL_OUTOF10: 5
PAINLEVEL_OUTOF10: 6
PAINLEVEL_OUTOF10: 2
PAINLEVEL_OUTOF10: 1
PAINLEVEL_OUTOF10: 4

## 2019-04-05 ASSESSMENT — PAIN - FUNCTIONAL ASSESSMENT: PAIN_FUNCTIONAL_ASSESSMENT: 0-10

## 2019-04-05 NOTE — INTERVAL H&P NOTE
History and Physical exam reviewed, the patient interviewed and re examined. There have been no interval changes.   Electronically signed by Nnamdi Ang MD on 4/5/2019 at 7:31 AM

## 2019-04-05 NOTE — ANESTHESIA POSTPROCEDURE EVALUATION
Department of Anesthesiology  Postprocedure Note    Patient: Jovany Millan  MRN: [id number]  YOB: 1985  Date of evaluation: 4/5/2019  Time:  12:31 PM     Procedure Summary     Date:  04/05/19 Room / Location:  [address] OR    Anesthesia Start:  0729 Anesthesia Stop:  3500    Procedure:  EXCISION OF CHEST WALL MASS X2 (N/A Chest) Diagnosis:  (CHEST WALL MASS)    Surgeon:  Guy David MD Responsible Provider:  Ermias Brown DO    Anesthesia Type:  general ASA Status:  1          Anesthesia Type: general    Flora Phase I: Flora Score: 10    Flora Phase II: Flora Score: 10    Last vitals: Reviewed and per EMR flowsheets.        Anesthesia Post Evaluation    Patient location during evaluation: PACU  Patient participation: complete - patient participated  Level of consciousness: awake  Pain score: 0  Airway patency: patent  Nausea & Vomiting: no nausea and no vomiting  Complications: no  Cardiovascular status: hemodynamically stable  Respiratory status: acceptable  Hydration status: euvolemic

## 2019-04-05 NOTE — BRIEF OP NOTE
Brief Postoperative Note  ______________________________________________________________    Patient: Sebastian Grullon  YOB: 1985  MRN: [id number]  Date of Procedure: 4/5/2019    Pre-Op Diagnosis: CHEST WALL MASS x 2    Post-Op Diagnosis: Same       Procedure(s):  EXCISION OF CHEST WALL MASS X2    Anesthesia: General    Surgeon(s):  Eleanor Ledesma MD    Assistant: Leslie Hope    Estimated Blood Loss (mL): less than 50     Complications: None    Specimens:   ID Type Source Tests Collected by Time Destination   A : chest wall lipoma, right Tissue Chest SURGICAL PATHOLOGY Eleanor Ledesma MD 4/5/2019 3361    B : chest wall mass, left Tissue Chest SURGICAL PATHOLOGY Eleanor Ledesma MD 4/5/2019 1835        Implants:  * No implants in log *      Drains: * No LDAs found *    Findings:  2 - 5 cm+ chest wall lipomas    Eleanor Ledesma MD  Date: 4/5/2019  Time: 8:14 AM

## 2019-04-06 NOTE — OP NOTE
[address]                      [address], [address] [facility]                                OPERATIVE REPORT    PATIENT NAME: Monie Pretty                       :        1985  MED REC NO:   [id number]                            ROOM:  ACCOUNT NO:   [de-identified]                           ADMIT DATE: 2019  PROVIDER:     Lin Soto MD    DATE OF PROCEDURE:  2019    PREOPERATIVE DIAGNOSIS:  Bilateral anterior chest wall lipomas. POSTOPERATIVE DIAGNOSIS:  Bilateral anterior chest wall lipomas. OPERATION PERFORMED:  Excision of bilateral chest wall lipomas. SURGEON:  Lin Soto MD    ANESTHESIA:  General.    COMPLICATIONS:  None. HISTORY:  The patient is a 72-year-old female who has painful bilateral  anterior chest wall lipomas. After discussing with her, her options of  therapy, she was agreeable to excision. The procedure as well as the  risks and complications were discussed including infection, blood loss,  damage to surrounding structures, recurrence and even the possibility of  needing further surgery in the future. She understood all this and was  agreeable to the procedure. OPERATIVE PROCEDURE:  The patient brought in the operative suite, placed  in the supine position. Lipomas had been marked preoperatively. General anesthetic was induced and she was intubated. Her lower  anterior chest wall was prepped and draped in a sterile fashion  bilaterally. Time-out called. The patient and procedure were properly  identified. Afterwards we proceeded to do the right side first.  The  area was infiltrated with 10 mL of 0.5% Marcaine with epinephrine. The  transverse incision was made and approximately a 6 cm subcutaneous mass  was excised from the ribcage anteriorly. The mass was excised all the  way down to the ribcage and was completely removed. Afterwards it  appeared to be a lipomatous mass. Irrigation was done.   There was  excellent hemostasis and closure was done using 2-0 Vicryl, 3-0 Vicryl  and 4-0 Monocryl with Dermaflex on the skin edge. I then went to the  similar area on the left side where once again 10 mL of 0.5% Marcaine  with epinephrine was infiltrated in the skin. A transverse incision was  made and once again another approximately 5-6 cm subcutaneous mass was  excised. It was excised down to the ribcage anteriorly. After it was  removed in its entirety, hemostasis was maintained with electrocautery  and closure was done using 2-0 Vicryl, 3-0 Vicryl and 4-0 Monocryl with  Dermaflex on the skin edges. She tolerated the procedures well, went to  recovery in stable condition and I spoke with her family at the end of  the procedure.         Wayne David MD    D: 04/06/2019 0:11:30       T: 04/06/2019 0:12:43     [email or web address]  Job#: [id number]     Doc#: [id number]    CC:

## 2019-04-08 ENCOUNTER — TELEPHONE (OUTPATIENT)
Dept: SURGERY | Age: 34
End: 2019-04-08

## 2019-04-08 NOTE — TELEPHONE ENCOUNTER
----- Message from Gita Palomino sent at 4/8/2019  8:37 AM EDT -----  Contact: [phone]  Pt had excision of chest wall mass on 04/05/19 with Dr Valentin Mckeon and woke up today with an itchy rash on her stomach. Please advise.

## 2019-04-08 NOTE — TELEPHONE ENCOUNTER
Spoke with Dominga Montero. Rash is in area of her incision. Told her to wash the area and try some benadryl. She is not taking many of her Norco. May be a topical reaction to the skin cleanser used at time of surgery. I advised her to call us back if no improvement and we will have her come see  to check the area.

## 2019-04-09 NOTE — TELEPHONE ENCOUNTER
Patient called back today and spoke to [place] who sent me this message:    Dagoberto Veliz   Phone Number: [phone]             Pt states her rash on stomach is spreading to her sides and back, she hasnt taken any bendryl due to not wanting to get dizzy and tired driving home from work. I called patient back and left her a message, she can take the Benadryl or she needs an appointment to see Chanell Silva to have the rash checked.

## 2019-04-19 ENCOUNTER — OFFICE VISIT (OUTPATIENT)
Dept: SURGERY | Age: 34
End: 2019-04-19

## 2019-04-19 VITALS
WEIGHT: 191 LBS | BODY MASS INDEX: 32.61 KG/M2 | DIASTOLIC BLOOD PRESSURE: 80 MMHG | TEMPERATURE: 98 F | HEIGHT: 64 IN | SYSTOLIC BLOOD PRESSURE: 126 MMHG

## 2019-04-19 DIAGNOSIS — D17.1 LIPOMA OF CHEST WALL: Primary | ICD-10-CM

## 2019-04-19 PROCEDURE — 99024 POSTOP FOLLOW-UP VISIT: CPT | Performed by: SURGERY

## 2019-04-19 NOTE — PROGRESS NOTES
Subjective:      Patient ID: Muna Duarte is a 34 y.o. female. HPI  Muna Duarte is her for a post operartive visit after chest wall lipomas x 2 removal on April, 5, 2019. Pain is rated as a  4. Pathology was lipomas. She denies drainage. She is back to normal daily activities. Review of Systems    Objective:   Physical Exam   Constitutional: She is oriented to person, place, and time. She appears well-developed and well-nourished. No distress. Pulmonary/Chest:   Incisions are well approximated, erythema is not present, swelling is mild, no evidence of hernia, tenderness is mild, drainage is not present, skin glue/sutures are present       Musculoskeletal:   Normal gait   Neurological: She is alert and oriented to person, place, and time. Psychiatric: She has a normal mood and affect. Judgment normal.       Assessment:      Satisfactory course      Plan:      The patient is instructed to return to see me in 1 month.          Lise Hernandez MD

## 2019-05-24 ENCOUNTER — OFFICE VISIT (OUTPATIENT)
Dept: SURGERY | Age: 34
End: 2019-05-24

## 2019-05-24 VITALS
SYSTOLIC BLOOD PRESSURE: 110 MMHG | BODY MASS INDEX: 32.13 KG/M2 | HEIGHT: 64 IN | WEIGHT: 188.2 LBS | TEMPERATURE: 98 F | DIASTOLIC BLOOD PRESSURE: 88 MMHG

## 2019-05-24 DIAGNOSIS — D17.1 LIPOMA OF CHEST WALL: Primary | ICD-10-CM

## 2019-05-24 PROCEDURE — 99024 POSTOP FOLLOW-UP VISIT: CPT | Performed by: SURGERY

## 2019-05-24 NOTE — PROGRESS NOTES
Subjective:      Patient ID: Demarcus Chong is a 34 y.o. female. HPI  Demarcus Chong is her for a post operartive visit after chest wall lipomas x 2 removal on April, 5, 2019. Pain is rated as a  0. Pathology was lipomas. She denies drainage. She is back to normal daily activities. She has numbness around the incisions. She thinks she feels another mass near the left incision. Review of Systems    Objective:   Physical Exam   Constitutional: She is oriented to person, place, and time. She appears well-developed and well-nourished. No distress. Pulmonary/Chest:   Incisions are well approximated, erythema is not present, swelling is mild, no tenderness, drainage is not present. Some nodularity noted around the left surgery site in the periphery,       Musculoskeletal:   Normal gait   Neurological: She is alert and oriented to person, place, and time. Psychiatric: She has a normal mood and affect. Judgment normal.       Assessment:      Satisfactory course  Nodularity on the left is the edges of subcutaneous tissue after a mass is removed      Plan:      The patient is instructed to return to see me prn.          Tracey Connell MD

## 2019-10-02 ENCOUNTER — TELEPHONE (OUTPATIENT)
Dept: OBGYN CLINIC | Age: 34
End: 2019-10-02

## 2019-10-04 ENCOUNTER — OFFICE VISIT (OUTPATIENT)
Dept: FAMILY MEDICINE CLINIC | Age: 34
End: 2019-10-04
Payer: COMMERCIAL

## 2019-10-04 VITALS
DIASTOLIC BLOOD PRESSURE: 72 MMHG | TEMPERATURE: 96.7 F | HEIGHT: 64 IN | HEART RATE: 62 BPM | SYSTOLIC BLOOD PRESSURE: 98 MMHG | BODY MASS INDEX: 28.85 KG/M2 | OXYGEN SATURATION: 99 % | WEIGHT: 169 LBS

## 2019-10-04 DIAGNOSIS — B07.9 VIRAL WARTS, UNSPECIFIED TYPE: Primary | ICD-10-CM

## 2019-10-04 PROCEDURE — 17110 DESTRUCTION B9 LES UP TO 14: CPT | Performed by: FAMILY MEDICINE

## 2019-10-04 ASSESSMENT — ENCOUNTER SYMPTOMS
WHEEZING: 0
NAUSEA: 0
COUGH: 0
EYE REDNESS: 0
FACIAL SWELLING: 0
EYE DISCHARGE: 0
DIARRHEA: 0

## 2019-11-08 ENCOUNTER — OFFICE VISIT (OUTPATIENT)
Dept: OBGYN CLINIC | Age: 34
End: 2019-11-08
Payer: COMMERCIAL

## 2019-11-08 VITALS
SYSTOLIC BLOOD PRESSURE: 124 MMHG | HEIGHT: 64 IN | DIASTOLIC BLOOD PRESSURE: 82 MMHG | BODY MASS INDEX: 29.19 KG/M2 | WEIGHT: 171 LBS

## 2019-11-08 DIAGNOSIS — N92.1 MENORRHAGIA WITH IRREGULAR CYCLE: ICD-10-CM

## 2019-11-08 DIAGNOSIS — Z11.51 SCREENING FOR HUMAN PAPILLOMAVIRUS: ICD-10-CM

## 2019-11-08 DIAGNOSIS — N94.6 DYSMENORRHEA: ICD-10-CM

## 2019-11-08 DIAGNOSIS — Z01.419 WOMEN'S ANNUAL ROUTINE GYNECOLOGICAL EXAMINATION: Primary | ICD-10-CM

## 2019-11-08 PROCEDURE — G8427 DOCREV CUR MEDS BY ELIG CLIN: HCPCS | Performed by: OBSTETRICS & GYNECOLOGY

## 2019-11-08 PROCEDURE — 99213 OFFICE O/P EST LOW 20 MIN: CPT | Performed by: OBSTETRICS & GYNECOLOGY

## 2019-11-08 PROCEDURE — 1036F TOBACCO NON-USER: CPT | Performed by: OBSTETRICS & GYNECOLOGY

## 2019-11-08 PROCEDURE — 99385 PREV VISIT NEW AGE 18-39: CPT | Performed by: OBSTETRICS & GYNECOLOGY

## 2019-11-08 PROCEDURE — G8417 CALC BMI ABV UP PARAM F/U: HCPCS | Performed by: OBSTETRICS & GYNECOLOGY

## 2019-11-08 PROCEDURE — G8484 FLU IMMUNIZE NO ADMIN: HCPCS | Performed by: OBSTETRICS & GYNECOLOGY

## 2019-11-08 ASSESSMENT — ENCOUNTER SYMPTOMS
ABDOMINAL DISTENTION: 0
RECTAL PAIN: 0
NAUSEA: 0
BLOOD IN STOOL: 0
CONSTIPATION: 0
ABDOMINAL PAIN: 0
RESPIRATORY NEGATIVE: 1
DIARRHEA: 0
VOMITING: 0
ALLERGIC/IMMUNOLOGIC NEGATIVE: 1
EYES NEGATIVE: 1
ANAL BLEEDING: 0

## 2019-11-19 DIAGNOSIS — Z01.419 WOMEN'S ANNUAL ROUTINE GYNECOLOGICAL EXAMINATION: ICD-10-CM

## 2019-11-19 DIAGNOSIS — Z11.51 SCREENING FOR HUMAN PAPILLOMAVIRUS: ICD-10-CM

## 2019-12-17 ENCOUNTER — OFFICE VISIT (OUTPATIENT)
Dept: FAMILY MEDICINE CLINIC | Age: 34
End: 2019-12-17
Payer: COMMERCIAL

## 2019-12-17 VITALS
OXYGEN SATURATION: 99 % | WEIGHT: 169 LBS | TEMPERATURE: 96.6 F | SYSTOLIC BLOOD PRESSURE: 100 MMHG | BODY MASS INDEX: 28.85 KG/M2 | DIASTOLIC BLOOD PRESSURE: 72 MMHG | HEART RATE: 72 BPM | HEIGHT: 64 IN

## 2019-12-17 DIAGNOSIS — B07.9 VIRAL WARTS, UNSPECIFIED TYPE: Primary | ICD-10-CM

## 2019-12-17 PROCEDURE — 1036F TOBACCO NON-USER: CPT | Performed by: FAMILY MEDICINE

## 2019-12-17 PROCEDURE — 99212 OFFICE O/P EST SF 10 MIN: CPT | Performed by: FAMILY MEDICINE

## 2019-12-17 PROCEDURE — G8484 FLU IMMUNIZE NO ADMIN: HCPCS | Performed by: FAMILY MEDICINE

## 2019-12-17 PROCEDURE — G8417 CALC BMI ABV UP PARAM F/U: HCPCS | Performed by: FAMILY MEDICINE

## 2019-12-17 PROCEDURE — G8427 DOCREV CUR MEDS BY ELIG CLIN: HCPCS | Performed by: FAMILY MEDICINE

## 2019-12-17 ASSESSMENT — ENCOUNTER SYMPTOMS: COLOR CHANGE: 0

## 2020-02-14 ENCOUNTER — OFFICE VISIT (OUTPATIENT)
Dept: OBGYN CLINIC | Age: 35
End: 2020-02-14
Payer: COMMERCIAL

## 2020-02-14 VITALS
HEIGHT: 64 IN | DIASTOLIC BLOOD PRESSURE: 72 MMHG | WEIGHT: 164 LBS | SYSTOLIC BLOOD PRESSURE: 122 MMHG | BODY MASS INDEX: 28 KG/M2

## 2020-02-14 PROCEDURE — 1036F TOBACCO NON-USER: CPT | Performed by: OBSTETRICS & GYNECOLOGY

## 2020-02-14 PROCEDURE — 99213 OFFICE O/P EST LOW 20 MIN: CPT | Performed by: OBSTETRICS & GYNECOLOGY

## 2020-02-14 PROCEDURE — G8427 DOCREV CUR MEDS BY ELIG CLIN: HCPCS | Performed by: OBSTETRICS & GYNECOLOGY

## 2020-02-14 PROCEDURE — G8484 FLU IMMUNIZE NO ADMIN: HCPCS | Performed by: OBSTETRICS & GYNECOLOGY

## 2020-02-14 PROCEDURE — G8417 CALC BMI ABV UP PARAM F/U: HCPCS | Performed by: OBSTETRICS & GYNECOLOGY

## 2020-02-14 ASSESSMENT — ENCOUNTER SYMPTOMS
ANAL BLEEDING: 0
BLOOD IN STOOL: 0
RECTAL PAIN: 0
NAUSEA: 0
ABDOMINAL DISTENTION: 0
CONSTIPATION: 0
EYES NEGATIVE: 1
RESPIRATORY NEGATIVE: 1
ABDOMINAL PAIN: 0
DIARRHEA: 0
VOMITING: 0
ALLERGIC/IMMUNOLOGIC NEGATIVE: 1

## 2020-02-14 NOTE — PROGRESS NOTES
Patient here for annual med check on OCP ( Lo-Loestrin ). States she is still having some BTB. Takes pill daily around same time. No missed pills. Again, discussed other options. Previously on Ortho-Cyclen as did well. Patient states she would like to continue Lo-Loestrin for another 3 months to see if BTB regulates. If not working, will call for Rx of Ortho-Cyclen. Reviewed hx. All questions answered. F/U annual exam.   Pt was seen with total face to face time of 15 minutes with more than 50% of the visit being counseling and education regarding encounter dx of med check. See discussion /counseling details as stated above. Vitals:  /72   Ht 5' 4\" (1.626 m)   Wt 164 lb (74.4 kg)   LMP 01/27/2020   BMI 28.15 kg/m²   Past Medical History:   Diagnosis Date    GERD (gastroesophageal reflux disease)      Past Surgical History:   Procedure Laterality Date    EXCISION / BIOPSY SKIN LESION OF TRUNK N/A 4/5/2019    EXCISION OF CHEST WALL MASS X2 performed by Mark Manjarrez MD at [address]  8/2012    mole removal on back left side     WISDOM TOOTH EXTRACTION  2005     Allergies:  Dextromethorphan-guaifenesin  Current Outpatient Medications   Medication Sig Dispense Refill    norethindrone-ethinyl estradiol-Fe (LO LOESTRIN FE) 1 MG-10 MCG / 10 MCG tablet Take 1 tablet by mouth daily 84 tablet 2    NONFORMULARY Indications: revital you appetite suppressant       Probiotic Product (PROBIOTIC-10 PO) Take by mouth      Multiple Vitamins-Minerals (ONE-A-DAY WOMENS VITACRAVES) CHEW Take by mouth      Lansoprazole (PREVACID PO) Take 1 tablet by mouth daily. No current facility-administered medications for this visit.       Social History     Socioeconomic History    Marital status: Single     Spouse name: Not on file    Number of children: Not on file    Years of education: Not on file    Highest education level: Not on file   Occupational History    Not on file   Social Needs    Financial resource strain: Not on file    Food insecurity:     Worry: Not on file     Inability: Not on file    Transportation needs:     Medical: Not on file     Non-medical: Not on file   Tobacco Use    Smoking status: Former Smoker     Packs/day: 0.25     Years: 9.00     Pack years: 2.25     Types: Cigarettes     Last attempt to quit: 2017     Years since quittin.5    Smokeless tobacco: Never Used    Tobacco comment: 4 cigs daily  2/4/15   Substance and Sexual Activity    Alcohol use: Yes     Comment: occassional    Drug use: No    Sexual activity: Not Currently     Partners: Male     Birth control/protection: Condom   Lifestyle    Physical activity:     Days per week: Not on file     Minutes per session: Not on file    Stress: Not on file   Relationships    Social connections:     Talks on phone: Not on file     Gets together: Not on file     Attends Sikh service: Not on file     Active member of club or organization: Not on file     Attends meetings of clubs or organizations: Not on file     Relationship status: Not on file    Intimate partner violence:     Fear of current or ex partner: Not on file     Emotionally abused: Not on file     Physically abused: Not on file     Forced sexual activity: Not on file   Other Topics Concern    Not on file   Social History Narrative    Not on file        Family History   Problem Relation Age of Onset    High Blood Pressure Mother     No Known Problems Father     Cancer Paternal Aunt         rigoberto lymp    Heart Disease Maternal Grandfather         bi pass    Stroke Maternal Grandfather         2012 mini stroke    Diabetes Maternal Grandfather     Cancer Paternal Grandmother         lymphomia    Diabetes Paternal Grandfather        Review of Systems   Constitutional: Negative. Negative for activity change, appetite change, chills, diaphoresis, fatigue, fever and unexpected weight change. HENT: Negative. Take 1 tablet by mouth daily     Dispense:  84 tablet     Refill:  2         Orders placedthis encounter:  No orders of the defined types were placed in this encounter.         Follow up:  Return in about 1 year (around 2/14/2021) for Annual.

## 2020-04-06 RX ORDER — NORETHINDRONE ACETATE AND ETHINYL ESTRADIOL, ETHINYL ESTRADIOL AND FERROUS FUMARATE 1MG-10(24)
1 KIT ORAL DAILY
Qty: 28 TABLET | Refills: 6 | Status: SHIPPED | OUTPATIENT
Start: 2020-04-06 | End: 2020-04-21

## 2020-04-21 ENCOUNTER — TELEMEDICINE (OUTPATIENT)
Dept: FAMILY MEDICINE CLINIC | Age: 35
End: 2020-04-21
Payer: COMMERCIAL

## 2020-04-21 VITALS — HEIGHT: 64 IN | WEIGHT: 161 LBS | BODY MASS INDEX: 27.49 KG/M2

## 2020-04-21 PROCEDURE — 99213 OFFICE O/P EST LOW 20 MIN: CPT | Performed by: NURSE PRACTITIONER

## 2020-04-21 PROCEDURE — G8427 DOCREV CUR MEDS BY ELIG CLIN: HCPCS | Performed by: NURSE PRACTITIONER

## 2020-04-21 RX ORDER — AMOXICILLIN AND CLAVULANATE POTASSIUM 875; 125 MG/1; MG/1
1 TABLET, FILM COATED ORAL 2 TIMES DAILY
Qty: 20 TABLET | Refills: 0 | Status: SHIPPED | OUTPATIENT
Start: 2020-04-21 | End: 2020-05-01

## 2020-04-21 RX ORDER — FLUTICASONE PROPIONATE 50 MCG
1 SPRAY, SUSPENSION (ML) NASAL DAILY
Qty: 2 BOTTLE | Refills: 1 | Status: SHIPPED | OUTPATIENT
Start: 2020-04-21 | End: 2022-05-13 | Stop reason: SDUPTHER

## 2020-04-21 RX ORDER — NICOTINE POLACRILEX 2 MG
GUM BUCCAL
COMMUNITY
End: 2021-12-10

## 2020-04-21 ASSESSMENT — ENCOUNTER SYMPTOMS
SORE THROAT: 0
SINUS PRESSURE: 1
RHINORRHEA: 0
SINUS PAIN: 1
COUGH: 0
SHORTNESS OF BREATH: 0
BACK PAIN: 0

## 2020-04-21 NOTE — PROGRESS NOTES
2020    TELEHEALTH EVALUATION -- Audio/Visual (During SYEVE-16 public health emergency)    Due to Genie 19 outbreak, patient's office visit was converted to a virtual visit. Patient was contacted and agreed to proceed with a virtual visit via [address] Visit  The risks and benefits of converting to a virtual visit were discussed in light of the current infectious disease epidemic. Patient also understood that insurance coverage and co-pays are up to their individual insurance plans. HPI:    Kushal Edson (:  [id number]) has requested an audio/video evaluation for the following concern(s):    Sinus infection, a lot of pressure through her maxillary sinus area. Was having drainage; however that has now stopped. Now just having significant tenderness in her maxillary sinus area, more on right than left. Started back in February, was taking advil cold and sinus which was helping, but isn't anymore. Had temp of 99.2 last week. No coughing or shortness of breath. Denies any ear pain or sore throat. Review of Systems   Constitutional: Negative for appetite change, chills, diaphoresis, fatigue and fever. HENT: Positive for congestion, sinus pressure and sinus pain. Negative for ear pain, postnasal drip, rhinorrhea, sneezing and sore throat. Respiratory: Negative for cough and shortness of breath. Cardiovascular: Negative for chest pain, palpitations and leg swelling. Musculoskeletal: Negative for arthralgias and back pain. Neurological: Positive for headaches. Negative for dizziness. Psychiatric/Behavioral: Negative for dysphoric mood. The patient is not nervous/anxious. Prior to Visit Medications    Medication Sig Taking?  Authorizing Provider   Biotin 1 MG CAPS Take by mouth Yes Historical Provider, MD   amoxicillin-clavulanate (AUGMENTIN) 875-125 MG per tablet Take 1 tablet by mouth 2 times daily for 10 days Yes Mickey Lynn APRN - CNP   fluticasone (FLONASE) 50 MCG/ACT nasal spray 1 spray by Each Nostril route daily Yes DOLORES Han - CNP   norethindrone-ethinyl estradiol-Fe (LO LOESTRIN FE) 1 MG-10 MCG / 10 MCG tablet Take 1 tablet by mouth daily Yes Rachel Mccoy MD   Probiotic Product (PROBIOTIC-10 PO) Take by mouth Yes Historical Provider, MD   Multiple Vitamins-Minerals (ONE-A-DAY WOMENS VITACRAVES) CHEW Take by mouth Yes Historical Provider, MD   Lansoprazole (PREVACID PO) Take 1 tablet by mouth daily.   Historical Provider, MD       Social History     Tobacco Use    Smoking status: Former Smoker     Packs/day: 0.25     Years: 9.00     Pack years: 2.25     Types: Cigarettes     Last attempt to quit: 2017     Years since quittin.7    Smokeless tobacco: Never Used    Tobacco comment: 4 cigs daily  2/4/15   Substance Use Topics    Alcohol use: Yes     Comment: occassional    Drug use: No        Allergies   Allergen Reactions    Dextromethorphan-Guaifenesin Other (See Comments)     camacho Roland   ,   Past Medical History:   Diagnosis Date    GERD (gastroesophageal reflux disease)    ,   Past Surgical History:   Procedure Laterality Date    EXCISION / BIOPSY SKIN LESION OF TRUNK N/A 2019    EXCISION OF CHEST WALL MASS X2 performed by Mario Sierra MD at [address]  2012    mole removal on back left side     WISDOM TOOTH EXTRACTION     ,   Social History     Tobacco Use    Smoking status: Former Smoker     Packs/day: 0.25     Years: 9.00     Pack years: 2.25     Types: Cigarettes     Last attempt to quit: 2017     Years since quittin.7    Smokeless tobacco: Never Used    Tobacco comment: 4 cigs daily  2/4/15   Substance Use Topics    Alcohol use: Yes     Comment: occassional    Drug use: No   ,   Family History   Problem Relation Age of Onset    High Blood Pressure Mother     No Known Problems Father     Cancer Paternal Aunt         rigoberto lymp    Heart Disease Maternal Grandfather         bi pass   

## 2020-09-25 ENCOUNTER — OFFICE VISIT (OUTPATIENT)
Dept: FAMILY MEDICINE CLINIC | Age: 35
End: 2020-09-25
Payer: COMMERCIAL

## 2020-09-25 VITALS
BODY MASS INDEX: 26.46 KG/M2 | HEART RATE: 70 BPM | OXYGEN SATURATION: 98 % | TEMPERATURE: 97.9 F | DIASTOLIC BLOOD PRESSURE: 84 MMHG | WEIGHT: 155 LBS | HEIGHT: 64 IN | SYSTOLIC BLOOD PRESSURE: 130 MMHG

## 2020-09-25 DIAGNOSIS — R53.83 FATIGUE, UNSPECIFIED TYPE: ICD-10-CM

## 2020-09-25 DIAGNOSIS — R42 DIZZINESS: ICD-10-CM

## 2020-09-25 DIAGNOSIS — R51.9 FREQUENT HEADACHES: ICD-10-CM

## 2020-09-25 DIAGNOSIS — R23.3 EASY BRUISING: ICD-10-CM

## 2020-09-25 LAB
ALBUMIN SERPL-MCNC: 4.6 G/DL (ref 3.5–4.6)
ALP BLD-CCNC: 54 U/L (ref 40–130)
ALT SERPL-CCNC: 17 U/L (ref 0–33)
ANION GAP SERPL CALCULATED.3IONS-SCNC: 12 MEQ/L (ref 9–15)
AST SERPL-CCNC: 20 U/L (ref 0–35)
BASOPHILS ABSOLUTE: 0 K/UL (ref 0–0.2)
BASOPHILS RELATIVE PERCENT: 0.4 %
BILIRUB SERPL-MCNC: 0.3 MG/DL (ref 0.2–0.7)
BUN BLDV-MCNC: 18 MG/DL (ref 6–20)
CALCIUM SERPL-MCNC: 9.3 MG/DL (ref 8.5–9.9)
CHLORIDE BLD-SCNC: 104 MEQ/L (ref 95–107)
CO2: 24 MEQ/L (ref 20–31)
CREAT SERPL-MCNC: 0.77 MG/DL (ref 0.5–0.9)
EOSINOPHILS ABSOLUTE: 0.1 K/UL (ref 0–0.7)
EOSINOPHILS RELATIVE PERCENT: 1 %
GFR AFRICAN AMERICAN: >60
GFR NON-AFRICAN AMERICAN: >60
GLOBULIN: 2.5 G/DL (ref 2.3–3.5)
GLUCOSE BLD-MCNC: 90 MG/DL (ref 70–99)
HCT VFR BLD CALC: 41.1 % (ref 37–47)
HEMOGLOBIN: 13.4 G/DL (ref 12–16)
LYMPHOCYTES ABSOLUTE: 1.4 K/UL (ref 1–4.8)
LYMPHOCYTES RELATIVE PERCENT: 24 %
MCH RBC QN AUTO: 28.8 PG (ref 27–31.3)
MCHC RBC AUTO-ENTMCNC: 32.6 % (ref 33–37)
MCV RBC AUTO: 88.4 FL (ref 82–100)
MONOCYTES ABSOLUTE: 0.5 K/UL (ref 0.2–0.8)
MONOCYTES RELATIVE PERCENT: 7.9 %
NEUTROPHILS ABSOLUTE: 4 K/UL (ref 1.4–6.5)
NEUTROPHILS RELATIVE PERCENT: 66.7 %
PDW BLD-RTO: 13.7 % (ref 11.5–14.5)
PLATELET # BLD: 181 K/UL (ref 130–400)
POTASSIUM SERPL-SCNC: 4.6 MEQ/L (ref 3.4–4.9)
RBC # BLD: 4.65 M/UL (ref 4.2–5.4)
SODIUM BLD-SCNC: 140 MEQ/L (ref 135–144)
TOTAL PROTEIN: 7.1 G/DL (ref 6.3–8)
TSH REFLEX: 1.58 UIU/ML (ref 0.44–3.86)
VITAMIN B-12: 355 PG/ML (ref 232–1245)
VITAMIN D 25-HYDROXY: 49.9 NG/ML (ref 30–100)
WBC # BLD: 6 K/UL (ref 4.8–10.8)

## 2020-09-25 PROCEDURE — G8427 DOCREV CUR MEDS BY ELIG CLIN: HCPCS | Performed by: NURSE PRACTITIONER

## 2020-09-25 PROCEDURE — 1036F TOBACCO NON-USER: CPT | Performed by: NURSE PRACTITIONER

## 2020-09-25 PROCEDURE — G8417 CALC BMI ABV UP PARAM F/U: HCPCS | Performed by: NURSE PRACTITIONER

## 2020-09-25 PROCEDURE — 99214 OFFICE O/P EST MOD 30 MIN: CPT | Performed by: NURSE PRACTITIONER

## 2020-09-25 RX ORDER — LORATADINE 10 MG/1
10 TABLET ORAL DAILY
Qty: 30 TABLET | Refills: 0 | Status: SHIPPED | OUTPATIENT
Start: 2020-09-25 | End: 2020-11-10 | Stop reason: ALTCHOICE

## 2020-09-25 SDOH — ECONOMIC STABILITY: TRANSPORTATION INSECURITY
IN THE PAST 12 MONTHS, HAS LACK OF TRANSPORTATION KEPT YOU FROM MEETINGS, WORK, OR FROM GETTING THINGS NEEDED FOR DAILY LIVING?: NO

## 2020-09-25 SDOH — ECONOMIC STABILITY: FOOD INSECURITY: WITHIN THE PAST 12 MONTHS, YOU WORRIED THAT YOUR FOOD WOULD RUN OUT BEFORE YOU GOT MONEY TO BUY MORE.: NEVER TRUE

## 2020-09-25 SDOH — ECONOMIC STABILITY: TRANSPORTATION INSECURITY
IN THE PAST 12 MONTHS, HAS THE LACK OF TRANSPORTATION KEPT YOU FROM MEDICAL APPOINTMENTS OR FROM GETTING MEDICATIONS?: NO

## 2020-09-25 SDOH — ECONOMIC STABILITY: FOOD INSECURITY: WITHIN THE PAST 12 MONTHS, THE FOOD YOU BOUGHT JUST DIDN'T LAST AND YOU DIDN'T HAVE MONEY TO GET MORE.: NEVER TRUE

## 2020-09-25 SDOH — ECONOMIC STABILITY: INCOME INSECURITY: HOW HARD IS IT FOR YOU TO PAY FOR THE VERY BASICS LIKE FOOD, HOUSING, MEDICAL CARE, AND HEATING?: NOT HARD AT ALL

## 2020-09-25 ASSESSMENT — ENCOUNTER SYMPTOMS
SHORTNESS OF BREATH: 0
COUGH: 0
ABDOMINAL PAIN: 0
PHOTOPHOBIA: 0
NAUSEA: 0
BACK PAIN: 0
COLOR CHANGE: 0
VOMITING: 0

## 2020-09-25 ASSESSMENT — PATIENT HEALTH QUESTIONNAIRE - PHQ9
SUM OF ALL RESPONSES TO PHQ QUESTIONS 1-9: 0
SUM OF ALL RESPONSES TO PHQ QUESTIONS 1-9: 0
SUM OF ALL RESPONSES TO PHQ9 QUESTIONS 1 & 2: 0
1. LITTLE INTEREST OR PLEASURE IN DOING THINGS: 0
2. FEELING DOWN, DEPRESSED OR HOPELESS: 0

## 2020-09-25 NOTE — PROGRESS NOTES
Grandfather         8/2012 mini stroke    Diabetes Maternal Grandfather     Cancer Paternal Grandmother         lymphomia    Diabetes Paternal Grandfather      Social History     Socioeconomic History    Marital status: Single     Spouse name: None    Number of children: None    Years of education: None    Highest education level: None   Occupational History    None   Social Needs    Financial resource strain: Not hard at all   [place] insecurity     Worry: Never true     Inability: Never true    Transportation needs     Medical: No     Non-medical: No   Tobacco Use    Smoking status: Former Smoker     Packs/day: 0.25     Years: 9.00     Pack years: 2.25     Types: Cigarettes     Last attempt to quit: 7/24/2017     Years since quitting: 3.1    Smokeless tobacco: Never Used    Tobacco comment: 4 cigs daily  2/4/15   Substance and Sexual Activity    Alcohol use: Yes     Comment: occassional    Drug use: No    Sexual activity: Not Currently     Partners: Male     Birth control/protection: Condom   Lifestyle    Physical activity     Days per week: None     Minutes per session: None    Stress: None   Relationships    Social connections     Talks on phone: None     Gets together: None     Attends Yarsani service: None     Active member of club or organization: None     Attends meetings of clubs or organizations: None     Relationship status: None    Intimate partner violence     Fear of current or ex partner: None     Emotionally abused: None     Physically abused: None     Forced sexual activity: None   Other Topics Concern    None   Social History Narrative    None     Current Outpatient Medications on File Prior to Visit   Medication Sig Dispense Refill    Biotin 1 MG CAPS Take by mouth      fluticasone (FLONASE) 50 MCG/ACT nasal spray 1 spray by Each Nostril route daily 2 Bottle 1    norethindrone-ethinyl estradiol-Fe (LO LOESTRIN FE) 1 MG-10 MCG / 10 MCG tablet Take 1 tablet by mouth daily ear canal and external ear normal. There is no impacted cerumen. Nose: Nose normal. No congestion or rhinorrhea. Mouth/Throat:      Mouth: Mucous membranes are moist.      Pharynx: Oropharynx is clear. No oropharyngeal exudate. Eyes:      General: No visual field deficit. Right eye: No discharge. Left eye: No discharge. Extraocular Movements: Extraocular movements intact. Conjunctiva/sclera: Conjunctivae normal.      Pupils: Pupils are equal, round, and reactive to light. Neck:      Musculoskeletal: Normal range of motion and neck supple. No muscular tenderness. Thyroid: No thyromegaly. Cardiovascular:      Rate and Rhythm: Normal rate and regular rhythm. Pulses: Normal pulses. Heart sounds: Normal heart sounds. No murmur. Pulmonary:      Effort: Pulmonary effort is normal. No respiratory distress. Breath sounds: Normal breath sounds. No stridor. No wheezing, rhonchi or rales. Chest:      Chest wall: No tenderness. Musculoskeletal: Normal range of motion. Right lower leg: No edema. Left lower leg: No edema. Lymphadenopathy:      Cervical: No cervical adenopathy. Skin:     General: Skin is warm and dry. Capillary Refill: Capillary refill takes less than 2 seconds. Coloration: Skin is not jaundiced or pale. Findings: No bruising, erythema, lesion or rash. Neurological:      General: No focal deficit present. Mental Status: She is alert and oriented to person, place, and time. Mental status is at baseline. GCS: GCS eye subscore is 4. GCS verbal subscore is 5. GCS motor subscore is 6. Cranial Nerves: Cranial nerves are intact. No cranial nerve deficit, dysarthria or facial asymmetry. Sensory: Sensation is intact. Motor: Motor function is intact. No weakness, tremor or pronator drift. Coordination: Coordination is intact. Romberg sign negative.  Coordination normal. Finger-Nose-Finger Test normal.      Gait: Gait is intact. Gait and tandem walk normal.   Psychiatric:         Mood and Affect: Mood normal.         Speech: Speech normal.         Behavior: Behavior normal.         Thought Content: Thought content normal.         Judgment: Judgment normal.         Assessment& Plan     Diagnosis Orders   1. Frequent headaches  CBC With Auto Differential    Comprehensive Metabolic Panel    TSH with Reflex    Vitamin B12   2. Allergic rhinitis, unspecified seasonality, unspecified trigger  loratadine (CLARITIN) 10 MG tablet   3. Fatigue, unspecified type  TSH with Reflex    Vitamin B12    Vitamin D 25 Hydroxy   4. Dizziness  CBC With Auto Differential    Comprehensive Metabolic Panel    TSH with Reflex    Vitamin B12   5. Easy bruising  CBC With Auto Differential     Based on normal neuro exam today along with no red flag symptoms, will start with labs and treatment for allergies as a possible cause of symptoms. F/u in 1 week to recheck and go over labs. Headache journal over the next week. If no improvement in symptoms at that time, will pursue imaging to further evaluate. Side effects, adverse effects of the medication prescribed today, as well as treatment plan/ rationale and result expectations have been discussed with the patient who expresses understanding and desires to proceed. Close follow up to evaluate treatment results and for coordination of care. I have reviewed the patient's medical history in detail and updated the computerized patient record. As always, patient is advised that if symptoms worsen in any way they will proceed to the nearest emergency room.        Orders Placed This Encounter   Procedures    CBC With Auto Differential     Standing Status:   Future     Number of Occurrences:   1     Standing Expiration Date:   9/25/2021    Comprehensive Metabolic Panel     Standing Status:   Future     Number of Occurrences:   1     Standing Expiration Date:   9/25/2021    TSH with Reflex     Standing Status:   Future     Number of Occurrences:   1     Standing Expiration Date:   9/25/2021    Vitamin B12     Standing Status:   Future     Number of Occurrences:   1     Standing Expiration Date:   9/25/2021    Vitamin D 25 Hydroxy     Standing Status:   Future     Number of Occurrences:   1     Standing Expiration Date:   9/25/2021       Orders Placed This Encounter   Medications    loratadine (CLARITIN) 10 MG tablet     Sig: Take 1 tablet by mouth daily     Dispense:  30 tablet     Refill:  0       Return in about 1 week (around 10/2/2020) for recheck headaches.     Jazz Benjamin, APRN - CNP

## 2020-10-02 ENCOUNTER — OFFICE VISIT (OUTPATIENT)
Dept: FAMILY MEDICINE CLINIC | Age: 35
End: 2020-10-02
Payer: COMMERCIAL

## 2020-10-02 VITALS
DIASTOLIC BLOOD PRESSURE: 68 MMHG | TEMPERATURE: 97.2 F | WEIGHT: 156 LBS | SYSTOLIC BLOOD PRESSURE: 118 MMHG | HEIGHT: 64 IN | HEART RATE: 73 BPM | BODY MASS INDEX: 26.63 KG/M2 | OXYGEN SATURATION: 99 %

## 2020-10-02 PROCEDURE — G8484 FLU IMMUNIZE NO ADMIN: HCPCS | Performed by: NURSE PRACTITIONER

## 2020-10-02 PROCEDURE — 1036F TOBACCO NON-USER: CPT | Performed by: NURSE PRACTITIONER

## 2020-10-02 PROCEDURE — 99213 OFFICE O/P EST LOW 20 MIN: CPT | Performed by: NURSE PRACTITIONER

## 2020-10-02 PROCEDURE — G8417 CALC BMI ABV UP PARAM F/U: HCPCS | Performed by: NURSE PRACTITIONER

## 2020-10-02 PROCEDURE — G8427 DOCREV CUR MEDS BY ELIG CLIN: HCPCS | Performed by: NURSE PRACTITIONER

## 2020-10-02 ASSESSMENT — ENCOUNTER SYMPTOMS
COLOR CHANGE: 0
SHORTNESS OF BREATH: 0
ABDOMINAL PAIN: 0
PHOTOPHOBIA: 0
ABDOMINAL DISTENTION: 0
COUGH: 0
CHEST TIGHTNESS: 0
BACK PAIN: 0

## 2020-10-02 NOTE — PROGRESS NOTES
Subjective  Chief Complaint   Patient presents with    Check-Up     headaches, states that she thinks that they are better. she has had 1 that was in the back of her head and 7 that were around her eyes that she states when she shut her eyes it helped. HPI    F/u on headaches. Now mainly have been localized to frontal and temporal regions bilaterally. Did get one when she was working out where she also got lightheaded. Had one in the last week on the left occipital area that was at night. Will get some lightheadedness still when she works out. Some improvement, but still with the occasional severe headache on the left occipital lobe of head.     Past Medical History:   Diagnosis Date    GERD (gastroesophageal reflux disease)      Patient Active Problem List    Diagnosis Date Noted    Lipoma of chest wall 02/08/2019     Past Surgical History:   Procedure Laterality Date    EXCISION / BIOPSY SKIN LESION OF TRUNK N/A 4/5/2019    EXCISION OF CHEST WALL MASS X2 performed by Ellen Jimenes MD at [address] [place] [address]  8/2012    mole removal on back left side     WISDOM TOOTH EXTRACTION  2005     Family History   Problem Relation Age of Onset    High Blood Pressure Mother     No Known Problems Father     Cancer Paternal Aunt         rigoberto lymp    Heart Disease Maternal Grandfather         bi pass    Stroke Maternal Grandfather         8/2012 mini stroke    Diabetes Maternal Grandfather     Cancer Paternal Grandmother         lymphomia    Diabetes Paternal Grandfather      Social History     Socioeconomic History    Marital status: Single     Spouse name: None    Number of children: None    Years of education: None    Highest education level: None   Occupational History    None   Social Needs    Financial resource strain: Not hard at all   [place] insecurity     Worry: Never true     Inability: Never true    Transportation needs     Medical: No     Non-medical: No Tobacco Use    Smoking status: Former Smoker     Packs/day: 0.25     Years: 9.00     Pack years: 2.25     Types: Cigarettes     Last attempt to quit: 7/24/2017     Years since quitting: 3.1    Smokeless tobacco: Never Used    Tobacco comment: 4 cigs daily  2/4/15   Substance and Sexual Activity    Alcohol use: Yes     Comment: occassional    Drug use: No    Sexual activity: Not Currently     Partners: Male     Birth control/protection: Condom   Lifestyle    Physical activity     Days per week: None     Minutes per session: None    Stress: None   Relationships    Social connections     Talks on phone: None     Gets together: None     Attends Mormon service: None     Active member of club or organization: None     Attends meetings of clubs or organizations: None     Relationship status: None    Intimate partner violence     Fear of current or ex partner: None     Emotionally abused: None     Physically abused: None     Forced sexual activity: None   Other Topics Concern    None   Social History Narrative    None     Current Outpatient Medications on File Prior to Visit   Medication Sig Dispense Refill    loratadine (CLARITIN) 10 MG tablet Take 1 tablet by mouth daily 30 tablet 0    Biotin 1 MG CAPS Take by mouth      fluticasone (FLONASE) 50 MCG/ACT nasal spray 1 spray by Each Nostril route daily 2 Bottle 1    norethindrone-ethinyl estradiol-Fe (LO LOESTRIN FE) 1 MG-10 MCG / 10 MCG tablet Take 1 tablet by mouth daily 84 tablet 2    Probiotic Product (PROBIOTIC-10 PO) Take by mouth      Multiple Vitamins-Minerals (ONE-A-DAY WOMENS VITACRAVES) CHEW Take by mouth      Lansoprazole (PREVACID PO) Take 1 tablet by mouth daily. No current facility-administered medications on file prior to visit. Allergies   Allergen Reactions    Dextromethorphan-Guaifenesin Other (See Comments)     Weak, shaky       Review of Systems   Constitutional: Negative for chills, diaphoresis, fatigue and fever. HENT: Negative for congestion and ear pain. Eyes: Negative for photophobia and visual disturbance. Respiratory: Negative for cough, chest tightness and shortness of breath. Cardiovascular: Negative for chest pain, palpitations and leg swelling. Gastrointestinal: Negative for abdominal distention and abdominal pain. Musculoskeletal: Negative for arthralgias and back pain. Skin: Negative for color change and rash. Neurological: Positive for dizziness, light-headedness and headaches. Negative for weakness. Psychiatric/Behavioral: Negative for dysphoric mood. The patient is not nervous/anxious. Objective  Vitals:    10/02/20 1025   BP: 118/68   Site: Left Upper Arm   Position: Sitting   Cuff Size: Medium Adult   Pulse: 73   Temp: 97.2 °F (36.2 °C)   TempSrc: Infrared   SpO2: 99%   Weight: 156 lb (70.8 kg)   Height: 5' 4\" (1.626 m)     Physical Exam  Constitutional:       General: She is not in acute distress. Appearance: Normal appearance. She is normal weight. She is not ill-appearing or toxic-appearing. HENT:      Head: Normocephalic and atraumatic. Right Ear: External ear normal.      Left Ear: External ear normal.   Neck:      Musculoskeletal: Normal range of motion and neck supple. No muscular tenderness. Cardiovascular:      Rate and Rhythm: Normal rate and regular rhythm. Pulses: Normal pulses. Heart sounds: Normal heart sounds. No murmur. Pulmonary:      Effort: Pulmonary effort is normal. No respiratory distress. Breath sounds: Normal breath sounds. No stridor. No wheezing, rhonchi or rales. Chest:      Chest wall: No tenderness. Musculoskeletal: Normal range of motion. Right lower leg: No edema. Left lower leg: No edema. Lymphadenopathy:      Cervical: No cervical adenopathy. Skin:     General: Skin is warm and dry. Capillary Refill: Capillary refill takes less than 2 seconds. Coloration: Skin is not jaundiced or pale. Findings: No bruising, erythema, lesion or rash. Neurological:      General: No focal deficit present. Mental Status: She is alert and oriented to person, place, and time. Mental status is at baseline. Cranial Nerves: No cranial nerve deficit. Coordination: Coordination normal.      Gait: Gait normal.   Psychiatric:         Mood and Affect: Mood normal.         Behavior: Behavior normal.         Thought Content: Thought content normal.         Judgment: Judgment normal.         Assessment& Plan     Diagnosis Orders   1. Frequent headaches  MRI BRAIN WO CONTRAST    MRA HEAD WO CONTRAST   2. Lightheadedness  MRI BRAIN WO CONTRAST    MRA HEAD WO CONTRAST   3. Dizziness       MRI/MRA as ordered d/t persistent symptoms. Continue current regimen. F/u in 4 weeks or sooner PRN. Side effects, adverse effects of the medication prescribed today, as well as treatment plan/ rationale and result expectations have been discussed with the patient who expresses understanding and desires to proceed. Close follow up to evaluate treatment results and for coordination of care. I have reviewed the patient's medical history in detail and updated the computerized patient record. As always, patient is advised that if symptoms worsen in any way they will proceed to the nearest emergency room. Orders Placed This Encounter   Procedures    MRI BRAIN WO CONTRAST     Standing Status:   Future     Standing Expiration Date:   10/2/2021     Order Specific Question:   Reason for exam:     Answer:   frequent headaches, lightheadedness    MRA HEAD WO CONTRAST     Standing Status:   Future     Standing Expiration Date:   10/2/2021     Order Specific Question:   Reason for exam:     Answer:   frequent headaches, lightheadedness       No orders of the defined types were placed in this encounter. Return in about 4 weeks (around 10/30/2020) for headaches.     DOLORES Sparks - CNP

## 2020-10-14 ENCOUNTER — TELEPHONE (OUTPATIENT)
Dept: FAMILY MEDICINE CLINIC | Age: 35
End: 2020-10-14

## 2020-10-14 ENCOUNTER — TELEPHONE (OUTPATIENT)
Dept: OBGYN CLINIC | Age: 35
End: 2020-10-14

## 2020-10-14 NOTE — TELEPHONE ENCOUNTER
Patient had an MRI schedule for this Friday. States it was denied by ins. Would like to know what plan is now. Other imaging or prior auth? Please advise.

## 2020-10-15 RX ORDER — NORETHINDRONE ACETATE AND ETHINYL ESTRADIOL, ETHINYL ESTRADIOL AND FERROUS FUMARATE 1MG-10(24)
1 KIT ORAL DAILY
Qty: 84 TABLET | Refills: 0 | Status: SHIPPED | OUTPATIENT
Start: 2020-10-15 | End: 2020-11-20 | Stop reason: SDUPTHER

## 2020-10-15 NOTE — TELEPHONE ENCOUNTER
States that the headaches in the back of her head have lessened and the pain is not as severe, dull. States that the temples have been aching more. , states that if she puts her head down it does help. Has not noticed dizziness as often.

## 2020-10-15 NOTE — TELEPHONE ENCOUNTER
Would she be willing to see neurology? We can try to get her in there sooner rather than later. They may be able to get MRI approved if they think it is necessary.

## 2020-11-10 ENCOUNTER — OFFICE VISIT (OUTPATIENT)
Dept: FAMILY MEDICINE CLINIC | Age: 35
End: 2020-11-10
Payer: COMMERCIAL

## 2020-11-10 VITALS
HEIGHT: 64 IN | SYSTOLIC BLOOD PRESSURE: 132 MMHG | HEART RATE: 87 BPM | WEIGHT: 152 LBS | TEMPERATURE: 97.3 F | DIASTOLIC BLOOD PRESSURE: 84 MMHG | OXYGEN SATURATION: 99 % | BODY MASS INDEX: 25.95 KG/M2

## 2020-11-10 PROCEDURE — G8484 FLU IMMUNIZE NO ADMIN: HCPCS | Performed by: NURSE PRACTITIONER

## 2020-11-10 PROCEDURE — G8427 DOCREV CUR MEDS BY ELIG CLIN: HCPCS | Performed by: NURSE PRACTITIONER

## 2020-11-10 PROCEDURE — 99213 OFFICE O/P EST LOW 20 MIN: CPT | Performed by: NURSE PRACTITIONER

## 2020-11-10 PROCEDURE — 1036F TOBACCO NON-USER: CPT | Performed by: NURSE PRACTITIONER

## 2020-11-10 PROCEDURE — G8417 CALC BMI ABV UP PARAM F/U: HCPCS | Performed by: NURSE PRACTITIONER

## 2020-11-10 ASSESSMENT — ENCOUNTER SYMPTOMS
STRIDOR: 0
ABDOMINAL PAIN: 0
SINUS PAIN: 0
PHOTOPHOBIA: 0
VOMITING: 0
COUGH: 0
RHINORRHEA: 0
CHOKING: 0
ABDOMINAL DISTENTION: 0
BACK PAIN: 0
DIARRHEA: 0
NAUSEA: 0

## 2020-11-10 NOTE — PATIENT INSTRUCTIONS
2. Dreamy kid (some free parts)   This ralph plays relaxing sounds and has 2 free guided meditations including a body scan and a meditation for self esteem. The ralph also has other meditations that are not free. 4. Mindshift (free)  This ralph has information on anxiety, a symptom monitor, skills for relaxation and stress management, thinking strategies to help with anxiety, and tips on ideas to try to decrease anxiety.

## 2020-11-10 NOTE — PROGRESS NOTES
Subjective  Chief Complaint   Patient presents with    1 Month Follow-Up     headaches, states that she thought they were getting better. the week of October 25th she states was a bad week but was very stressed at work. But states that otherwise they have been better. HPI     Patient is here for a follow up on headaches. States they are getting better. October 25th, was her worst week due to work and stress, temporal area, pulsating, every day that week, took Alleve and cold and sinus, some relief. Since that week, 1-2 times a week, not as bad. Has not noticed certain triggers. Was getting sinus pressure/allergies, was taking the Claritin, some relief. Not taking anymore, ran out the week of the 25th. Has been using the Blue light glasses for work. Pt states she had one headache that woke up her up during the night. Unable to get the MRI due to insurance reasons. Has an apt with the neurologist December 3rd. States the dizziness has subsided.      Past Medical History:   Diagnosis Date    GERD (gastroesophageal reflux disease)      Patient Active Problem List    Diagnosis Date Noted    Lipoma of chest wall 02/08/2019     Past Surgical History:   Procedure Laterality Date    EXCISION / BIOPSY SKIN LESION OF TRUNK N/A 4/5/2019    EXCISION OF CHEST WALL MASS X2 performed by Alexandro Bumpers, MD at [facility]  8/2012    mole removal on back left side     WISDOM TOOTH EXTRACTION  2005     Family History   Problem Relation Age of Onset    High Blood Pressure Mother     No Known Problems Father     Cancer Paternal Aunt         rigoberto lymp    Heart Disease Maternal Grandfather         bi pass    Stroke Maternal Grandfather         8/2012 mini stroke    Diabetes Maternal Grandfather     Cancer Paternal Grandmother         lymphomia    Diabetes Paternal Grandfather      Social History     Socioeconomic History    Marital status: Single     Spouse name: None    Number of children: None    Years of education: None    Highest education level: None   Occupational History    None   Social Needs    Financial resource strain: Not hard at all   [place] insecurity     Worry: Never true     Inability: Never true   [employer] needs     Medical: No     Non-medical: No   Tobacco Use    Smoking status: Former Smoker     Packs/day: 0.25     Years: 9.00     Pack years: 2.25     Types: Cigarettes     Last attempt to quit: 7/24/2017     Years since quitting: 3.3    Smokeless tobacco: Never Used    Tobacco comment: 4 cigs daily  2/4/15   Substance and Sexual Activity    Alcohol use: Yes     Comment: occassional    Drug use: No    Sexual activity: Not Currently     Partners: Male     Birth control/protection: Condom   Lifestyle    Physical activity     Days per week: None     Minutes per session: None    Stress: None   Relationships    Social connections     Talks on phone: None     Gets together: None     Attends Anabaptist service: None     Active member of club or organization: None     Attends meetings of clubs or organizations: None     Relationship status: None    Intimate partner violence     Fear of current or ex partner: None     Emotionally abused: None     Physically abused: None     Forced sexual activity: None   Other Topics Concern    None   Social History Narrative    None     Current Outpatient Medications on File Prior to Visit   Medication Sig Dispense Refill    norethindrone-ethinyl estradiol-Fe (LO LOESTRIN FE) 1 MG-10 MCG / 10 MCG tablet Take 1 tablet by mouth daily 84 tablet 0    Biotin 1 MG CAPS Take by mouth      fluticasone (FLONASE) 50 MCG/ACT nasal spray 1 spray by Each Nostril route daily 2 Bottle 1    Multiple Vitamins-Minerals (ONE-A-DAY WOMENS VITACRAVES) CHEW Take by mouth      Lansoprazole (PREVACID PO) Take 1 tablet by mouth daily.       Probiotic Product (PROBIOTIC-10 PO) Take by mouth       No current facility-administered medications on Heart sounds: Normal heart sounds. Pulmonary:      Effort: Pulmonary effort is normal.      Breath sounds: Normal breath sounds. Abdominal:      General: Bowel sounds are normal.      Palpations: Abdomen is soft. Skin:     General: Skin is warm. Capillary Refill: Capillary refill takes less than 2 seconds. Neurological:      General: No focal deficit present. Mental Status: She is alert and oriented to person, place, and time. Mental status is at baseline. Psychiatric:         Mood and Affect: Mood normal.         Behavior: Behavior normal.         Thought Content: Thought content normal.         Judgment: Judgment normal.         Assessment& Plan     Diagnosis Orders   1. Frequent headaches     2. Stress         Continue with Flonase as needed for sinus pressure as well as the Cold and sinus and Alleve for symptom relief. Offered refill of Claritin, patient refused at this time, will obtain OTC if needed. Continue with blue light glasses at work. Follow up with neurology, scheduled Dec 3rd. Call if symptoms worsen. Discussed meditation and relaxation strategies to assist with stress as this is a contributing factor to her headaches. Side effects, adverse effects of the medication prescribed today, as well as treatment plan/ rationale and result expectations have been discussed with the patient who expresses understanding and desires to proceed. Close follow up to evaluate treatment results and for coordination of care. I have reviewed the patient's medical history in detail and updated the computerized patient record. As always, patient is advised that if symptoms worsen in any way they will proceed to the nearest emergency room. No orders of the defined types were placed in this encounter. No orders of the defined types were placed in this encounter. Return if symptoms worsen or fail to improve.     Tootie Purvis, DOLORES - CNP

## 2020-11-17 RX ORDER — LORATADINE 10 MG/1
10 TABLET ORAL DAILY
Qty: 30 TABLET | Refills: 5 | Status: SHIPPED | OUTPATIENT
Start: 2020-11-17 | End: 2021-08-10 | Stop reason: SDUPTHER

## 2020-11-20 ENCOUNTER — OFFICE VISIT (OUTPATIENT)
Dept: OBGYN CLINIC | Age: 35
End: 2020-11-20
Payer: COMMERCIAL

## 2020-11-20 VITALS
WEIGHT: 150 LBS | SYSTOLIC BLOOD PRESSURE: 120 MMHG | BODY MASS INDEX: 25.61 KG/M2 | DIASTOLIC BLOOD PRESSURE: 76 MMHG | HEIGHT: 64 IN

## 2020-11-20 PROCEDURE — 99395 PREV VISIT EST AGE 18-39: CPT | Performed by: OBSTETRICS & GYNECOLOGY

## 2020-11-20 PROCEDURE — G8484 FLU IMMUNIZE NO ADMIN: HCPCS | Performed by: OBSTETRICS & GYNECOLOGY

## 2020-11-20 RX ORDER — NORETHINDRONE ACETATE AND ETHINYL ESTRADIOL, ETHINYL ESTRADIOL AND FERROUS FUMARATE 1MG-10(24)
1 KIT ORAL DAILY
Qty: 84 TABLET | Refills: 3 | Status: SHIPPED | OUTPATIENT
Start: 2020-11-20 | End: 2021-12-10 | Stop reason: SDUPTHER

## 2020-11-20 ASSESSMENT — ENCOUNTER SYMPTOMS
NAUSEA: 0
ANAL BLEEDING: 0
RECTAL PAIN: 0
EYES NEGATIVE: 1
BLOOD IN STOOL: 0
ABDOMINAL PAIN: 0
RESPIRATORY NEGATIVE: 1
VOMITING: 0
DIARRHEA: 0
CONSTIPATION: 0
ALLERGIC/IMMUNOLOGIC NEGATIVE: 1
ABDOMINAL DISTENTION: 0

## 2020-11-20 ASSESSMENT — VISUAL ACUITY: OU: 1

## 2020-11-20 NOTE — PROGRESS NOTES
7/24/2017     Years since quitting: 3.3    Smokeless tobacco: Never Used    Tobacco comment: 4 cigs daily  2/4/15   Substance and Sexual Activity    Alcohol use: Yes     Comment: occassional    Drug use: No    Sexual activity: Not Currently     Partners: Male     Birth control/protection: Condom   Lifestyle    Physical activity     Days per week: Not on file     Minutes per session: Not on file    Stress: Not on file   Relationships    Social connections     Talks on phone: Not on file     Gets together: Not on file     Attends Oriental orthodox service: Not on file     Active member of club or organization: Not on file     Attends meetings of clubs or organizations: Not on file     Relationship status: Not on file    Intimate partner violence     Fear of current or ex partner: Not on file     Emotionally abused: Not on file     Physically abused: Not on file     Forced sexual activity: Not on file   Other Topics Concern    Not on file   Social History Narrative    Not on file     Family History   Problem Relation Age of Onset    High Blood Pressure Mother     No Known Problems Father     Cancer Paternal Aunt         rigoberto lymp    Heart Disease Maternal Grandfather         bi pass    Stroke Maternal Grandfather         8/2012 mini stroke    Diabetes Maternal Grandfather     Cancer Paternal Grandmother         lymphomia    Diabetes Paternal Grandfather        Review of Systems   Constitutional: Negative. Negative for activity change, appetite change, chills, diaphoresis, fatigue, fever and unexpected weight change. HENT: Negative. Eyes: Negative. Respiratory: Negative. Cardiovascular: Negative. Gastrointestinal: Negative for abdominal distention, abdominal pain, anal bleeding, blood in stool, constipation, diarrhea, nausea, rectal pain and vomiting. Endocrine: Negative.     Genitourinary: Negative for decreased urine volume, difficulty urinating, dyspareunia, dysuria, enuresis, flank pain, frequency, genital sores, hematuria, menstrual problem, pelvic pain, urgency, vaginal bleeding, vaginal discharge and vaginal pain. Musculoskeletal: Negative. Skin: Negative. Allergic/Immunologic: Negative. Neurological: Negative. Hematological: Negative. Psychiatric/Behavioral: Negative. Objective:     Physical Exam  Constitutional:       Appearance: She is well-developed. HENT:      Head: Normocephalic. Eyes:      General: Lids are normal. Vision grossly intact. Neck:      Musculoskeletal: Normal range of motion and neck supple. Thyroid: No thyromegaly. Cardiovascular:      Rate and Rhythm: Normal rate and regular rhythm. Heart sounds: Normal heart sounds. Pulmonary:      Effort: Pulmonary effort is normal. No respiratory distress. Breath sounds: Normal breath sounds. No wheezing or rales. Chest:      Chest wall: No tenderness. Breasts:         Right: Normal. No swelling, bleeding, inverted nipple, mass, nipple discharge, skin change or tenderness. Left: Normal. No swelling, bleeding, inverted nipple, mass, nipple discharge, skin change or tenderness. Abdominal:      General: There is no distension. Palpations: Abdomen is soft. There is no mass. Tenderness: There is no abdominal tenderness. There is no guarding or rebound. Hernia: No hernia is present. There is no hernia in the left inguinal area or right inguinal area. Genitourinary:     General: Normal vulva. Pubic Area: No rash. Labia:         Right: No rash, tenderness, lesion or injury. Left: No rash, tenderness, lesion or injury. Urethra: No prolapse, urethral swelling or urethral lesion. Vagina: Normal. No signs of injury and foreign body. No vaginal discharge, erythema, tenderness or bleeding. Cervix: No cervical motion tenderness, discharge or friability. Uterus: Not deviated, not enlarged, not fixed and not tender. Adnexa:         Right: No mass, tenderness or fullness. Left: No mass, tenderness or fullness. Rectum: Normal.   Musculoskeletal: Normal range of motion. General: No tenderness. Lymphadenopathy:      Cervical: No cervical adenopathy. Upper Body:      Right upper body: No supraclavicular or axillary adenopathy. Left upper body: No supraclavicular or axillary adenopathy. Lower Body: No right inguinal adenopathy. No left inguinal adenopathy. Skin:     General: Skin is warm and dry. Coloration: Skin is not pale. Findings: No erythema or rash. Neurological:      Mental Status: She is alert and oriented to person, place, and time. Psychiatric:         Behavior: Behavior normal.         Thought Content: Thought content normal.         Judgment: Judgment normal.         Assessment:       Diagnosis Orders   1. Women's annual routine gynecological examination  PAP SMEAR   2. Screening for human papillomavirus  PAP SMEAR        Plan:      Medications placedthis encounter:  No orders of the defined types were placed in this encounter. Orders placedthis encounter:  Orders Placed This Encounter   Procedures    PAP SMEAR     Standing Status:   Future     Standing Expiration Date:   11/20/2021     Order Specific Question:   Collection Type     Answer: Thin Prep     Order Specific Question:   Prior Abnormal Pap Test     Answer:   No     Order Specific Question:   Screening or Diagnostic     Answer:   Screening     Order Specific Question:   HPV Requested? Answer:   Yes     Order Specific Question:   High Risk Patient     Answer:   N/A         Follow up:  Return in about 1 year (around 11/20/2021) for Annual.   Repeat Annual GYN exam every 1 year. Cervical Cytology exam starts age 24. If Negative Cytology;  follow-up screening per current guidelines. Mammograms yearly starting at age 36. Calcium and Vitamin D dosing reviewed ( age appropriate ). Colonoscopy and bone density screening discussed ( age appropriate ). Birth control and STD prevention discussed ( age appropriate ). Gardisil counseling completed for all patients 7-35 yo. Routine health maintenance ( per PCP and guidelines ).

## 2020-12-03 ENCOUNTER — OFFICE VISIT (OUTPATIENT)
Dept: NEUROLOGY | Age: 35
End: 2020-12-03
Payer: COMMERCIAL

## 2020-12-03 VITALS
BODY MASS INDEX: 26.25 KG/M2 | WEIGHT: 152.9 LBS | HEART RATE: 79 BPM | SYSTOLIC BLOOD PRESSURE: 129 MMHG | DIASTOLIC BLOOD PRESSURE: 90 MMHG

## 2020-12-03 PROCEDURE — 99203 OFFICE O/P NEW LOW 30 MIN: CPT | Performed by: NURSE PRACTITIONER

## 2020-12-03 PROCEDURE — 1036F TOBACCO NON-USER: CPT | Performed by: NURSE PRACTITIONER

## 2020-12-03 PROCEDURE — G8417 CALC BMI ABV UP PARAM F/U: HCPCS | Performed by: NURSE PRACTITIONER

## 2020-12-03 PROCEDURE — G8484 FLU IMMUNIZE NO ADMIN: HCPCS | Performed by: NURSE PRACTITIONER

## 2020-12-03 PROCEDURE — G8427 DOCREV CUR MEDS BY ELIG CLIN: HCPCS | Performed by: NURSE PRACTITIONER

## 2020-12-03 RX ORDER — BACLOFEN 5 MG/1
5 TABLET ORAL 3 TIMES DAILY
Qty: 30 TABLET | Refills: 0 | Status: SHIPPED | OUTPATIENT
Start: 2020-12-03 | End: 2020-12-13

## 2020-12-03 ASSESSMENT — ENCOUNTER SYMPTOMS
FACIAL SWELLING: 0
PHOTOPHOBIA: 0
BACK PAIN: 0
NAUSEA: 0
EYE DISCHARGE: 0
SINUS PAIN: 1
COLOR CHANGE: 0
COUGH: 0
EYE REDNESS: 0
TROUBLE SWALLOWING: 0
SHORTNESS OF BREATH: 0
WHEEZING: 0
EYE PAIN: 0
VOMITING: 0
SINUS PRESSURE: 1
CHEST TIGHTNESS: 0

## 2020-12-03 NOTE — PROGRESS NOTES
has been being treated recently for chronic sinusitis with nasal spray and Advil Cold and Sinus and reports that she has seen some improvement with this. She reports that she is also had some episodes of dizziness that mainly occur with activity such as working out or bending over. She denies any tinnitus, otalgia, hearing changes. Patient denies history of seizure or stroke. She has not had any unusual or unplanned weight loss or recent illness or fever. Denies double vision, gait ataxia, falls, dysphagia, dysarthria, unusual rashes or arthralgias, nausea vomiting, photophobia, phonophobia, vision changes. Patient does have some generalized anxiety and is experience some stress related to work. She does clench her teeth. Denies depression. She reports that she wakes up frequently in the night. Denies snoring. Reports she is a social drinker. Denies tobacco or illicit drug use. Patient did have laboratory testing done on 9/25/2020 including vitamin D, vitamin B12, TSH, CBC and CMP that were all unremarkable.   Past Medical History:   Diagnosis Date    GERD (gastroesophageal reflux disease)      Past Surgical History:   Procedure Laterality Date    EXCISION / BIOPSY SKIN LESION OF TRUNK N/A 4/5/2019    EXCISION OF CHEST WALL MASS X2 performed by Ellen Jimenes [facility] at [facility]  8/2012    mole removal on back left side     WISDOM TOOTH EXTRACTION  2005     Social History     Socioeconomic History    Marital status: Single     Spouse name: Not on file    Number of children: Not on file    Years of education: Not on file    Highest education level: Not on file   Occupational History    Not on file   Social Needs    Financial resource strain: Not hard at all   [place] insecurity     Worry: Never true     Inability: Never true   [employer] needs     Medical: No     Non-medical: No   Tobacco Use    Smoking status: Former Smoker     Packs/day: 0.25     Years: 9.00     Pack years:  2.25     Types: Cigarettes     Last attempt to quit: 7/24/2017     Years since quitting: 3.3    Smokeless tobacco: Never Used    Tobacco comment: 4 cigs daily  2/4/15   Substance and Sexual Activity    Alcohol use: Yes     Comment: occassional    Drug use: No    Sexual activity: Not Currently     Partners: Male     Birth control/protection: Condom   Lifestyle    Physical activity     Days per week: Not on file     Minutes per session: Not on file    Stress: Not on file   Relationships    Social connections     Talks on phone: Not on file     Gets together: Not on file     Attends Jewish service: Not on file     Active member of club or organization: Not on file     Attends meetings of clubs or organizations: Not on file     Relationship status: Not on file    Intimate partner violence     Fear of current or ex partner: Not on file     Emotionally abused: Not on file     Physically abused: Not on file     Forced sexual activity: Not on file   Other Topics Concern    Not on file   Social History Narrative    Not on file     Family History   Problem Relation Age of Onset    High Blood Pressure Mother     No Known Problems Father     Cancer Paternal Aunt         rigoberto lymp    Heart Disease Maternal Grandfather         bi pass    Stroke Maternal Grandfather         8/2012 mini stroke    Diabetes Maternal Grandfather     Cancer Paternal Grandmother         lymphomia    Diabetes Paternal Grandfather      Allergies   Allergen Reactions    Dextromethorphan-Guaifenesin Other (See Comments)     Weak, shaky     Current Outpatient Medications on File Prior to Visit   Medication Sig Dispense Refill    norethindrone-ethinyl estradiol-Fe (LO LOESTRIN FE) 1 MG-10 MCG / 10 MCG tablet Take 1 tablet by mouth daily 84 tablet 3    loratadine (CLARITIN) 10 MG tablet Take 1 tablet by mouth daily 30 tablet 5    Biotin 1 MG CAPS Take by mouth      fluticasone (FLONASE) 50 MCG/ACT nasal spray 1 spray by Each Nostril route daily 2 Bottle 1    Probiotic Product (PROBIOTIC-10 PO) Take by mouth      Multiple Vitamins-Minerals (ONE-A-DAY WOMENS VITACRAVES) CHEW Take by mouth      Lansoprazole (PREVACID PO) Take 1 tablet by mouth daily. No current facility-administered medications on file prior to visit. Review of Systems   Constitutional: Negative for appetite change, chills, fatigue and fever. HENT: Positive for sinus pressure and sinus pain. Negative for dental problem, ear discharge, ear pain, facial swelling, hearing loss, tinnitus and trouble swallowing. Eyes: Negative for photophobia, pain, discharge, redness and visual disturbance. Respiratory: Negative for cough, chest tightness, shortness of breath and wheezing. Cardiovascular: Negative for chest pain, palpitations and leg swelling. Gastrointestinal: Negative for nausea and vomiting. Musculoskeletal: Positive for neck pain. Negative for arthralgias, back pain, gait problem, joint swelling, myalgias and neck stiffness. Skin: Negative for color change and rash. Neurological: Positive for headaches. Negative for dizziness, tremors, seizures, syncope, facial asymmetry, speech difficulty, weakness, light-headedness and numbness. Psychiatric/Behavioral: Positive for sleep disturbance. Negative for agitation, confusion and hallucinations. The patient is nervous/anxious. Objective:   BP (!) 129/90 (Site: Right Upper Arm, Position: Sitting, Cuff Size: Medium Adult)   Pulse 79   Wt 152 lb 14.4 oz (69.4 kg)   BMI 26.25 kg/m²     Physical Exam  Vitals signs reviewed. Constitutional:       General: She is not in acute distress. Appearance: She is not diaphoretic. HENT:      Head: Normocephalic and atraumatic. Eyes:      General: No visual field deficit. Right eye: No discharge. Left eye: No discharge. Extraocular Movements: Extraocular movements intact.       Pupils: Pupils are equal, round, and reactive to light. Neck:      Musculoskeletal: Normal range of motion and neck supple. No neck rigidity. Cardiovascular:      Rate and Rhythm: Normal rate and regular rhythm. Pulmonary:      Effort: Pulmonary effort is normal. No respiratory distress. Breath sounds: Normal breath sounds. Abdominal:      General: Bowel sounds are normal. There is no distension. Palpations: Abdomen is soft. Tenderness: There is no abdominal tenderness. Lymphadenopathy:      Cervical: No cervical adenopathy. Skin:     General: Skin is warm and dry. Neurological:      General: No focal deficit present. Mental Status: She is alert and oriented to person, place, and time. Cranial Nerves: No cranial nerve deficit, dysarthria or facial asymmetry. Motor: No weakness, tremor, atrophy, abnormal muscle tone, seizure activity or pronator drift. Coordination: Romberg sign negative. Coordination normal. Finger-Nose-Finger Test normal.      Gait: Gait normal.         No results found.     Lab Results   Component Value Date    WBC 6.0 09/25/2020    RBC 4.65 09/25/2020    HGB 13.4 09/25/2020    HCT 41.1 09/25/2020    MCV 88.4 09/25/2020    MCH 28.8 09/25/2020    MCHC 32.6 09/25/2020    RDW 13.7 09/25/2020     09/25/2020     Lab Results   Component Value Date     09/25/2020    K 4.6 09/25/2020     09/25/2020    CO2 24 09/25/2020    BUN 18 09/25/2020    CREATININE 0.77 09/25/2020    GFRAA >60.0 09/25/2020    LABGLOM >60.0 09/25/2020    GLUCOSE 90 09/25/2020    PROT 7.1 09/25/2020    LABALBU 4.6 09/25/2020    CALCIUM 9.3 09/25/2020    BILITOT 0.3 09/25/2020    ALKPHOS 54 09/25/2020    AST 20 09/25/2020    ALT 17 09/25/2020     No results found for: PROTIME, INR  Lab Results   Component Value Date    PUHVTSYG34 355 09/25/2020     Lab Results   Component Value Date    TRIG 119 02/07/2019    HDL 55 02/07/2019    LDLCALC 55 02/07/2019     No results found for: [address] [facility] [facility], LABBENZ, [place], LABMETH, OPIATESCREENURINE, PHENCYCLIDINESCREENURINE, PPXUR, ETOH  No results found for: LITHIUM, DILFRTOT, VALPROATE    Assessment and Plan:      1. Occipital neuralgia of left side  -Patient began experiencing occipital pain in September 2020. She was having attacks of sharp shooting pains up to 5 or 7 times per day lasting for several minutes at a time. She does have some hyperesthesia in this affected area in between attacks. Will obtain MRI of the brain and cervical spine to assess for cervical spine pathology and posterior fossa lesions. Will initiate patient on baclofen 5 mg 3 times a day. She was advised to initiate this once daily at at bedtime initially to see how she will tolerate this medication. She was advised to increase to 3 times a day as tolerated and needed. She was advised not to drive while taking this medication. Patient was advised on proper stretching exercises of the neck. Given patient's age and sudden onset of headaches she will be referred to ophthalmology for funduscopic exam to assess for palpable edema.  - MRI BRAIN WO CONTRAST; Future  - MRI CERVICAL SPINE WO CONTRAST; Future  - Baclofen (LIORESAL) 5 MG tablet; Take 1 tablet by mouth 3 times daily for 10 days  Dispense: 30 tablet; Refill: 0      Return in about 4 weeks (around 12/31/2020), or if symptoms worsen or fail to improve.     DOLORES King - CNP

## 2020-12-08 DIAGNOSIS — Z11.51 SCREENING FOR HUMAN PAPILLOMAVIRUS: ICD-10-CM

## 2020-12-08 DIAGNOSIS — Z11.3 ROUTINE SCREENING FOR STI (SEXUALLY TRANSMITTED INFECTION): ICD-10-CM

## 2020-12-08 DIAGNOSIS — Z01.419 WOMEN'S ANNUAL ROUTINE GYNECOLOGICAL EXAMINATION: ICD-10-CM

## 2020-12-08 NOTE — LETTER
[address], [address] [facility]  Phone: [phone]  Fax: [phone]    Bill Davis MD        [birth year]    [address]      Dear Lucretia Green:    The results of your most recent Pap smear are normal. This means that no cancerous or precancerous cells were seen. We recommend that you come back in 1 year for your next routine Pap smear. If you have any questions or concerns, please don't hesitate to call.     Sincerely,        Bill Davis MD

## 2020-12-18 ENCOUNTER — TELEPHONE (OUTPATIENT)
Dept: NEUROLOGY | Age: 35
End: 2020-12-18

## 2020-12-18 ENCOUNTER — HOSPITAL ENCOUNTER (OUTPATIENT)
Dept: MRI IMAGING | Age: 35
Discharge: HOME | End: 2020-12-20
Payer: COMMERCIAL

## 2020-12-18 PROCEDURE — 72141 MRI NECK SPINE W/O DYE: CPT

## 2020-12-18 PROCEDURE — 70551 MRI BRAIN STEM W/O DYE: CPT

## 2020-12-18 NOTE — TELEPHONE ENCOUNTER
Reviewed MRI brain MRI of cervical spine. No significant findings. Patient with some mild bulging disc of cervical spine. Called her and discussed. Will refer to physical therapy.

## 2020-12-29 ENCOUNTER — HOSPITAL ENCOUNTER (OUTPATIENT)
Dept: PHYSICAL THERAPY | Age: 35
Setting detail: THERAPIES SERIES
Discharge: HOME | End: 2020-12-29
Payer: COMMERCIAL

## 2020-12-29 PROCEDURE — 97161 PT EVAL LOW COMPLEX 20 MIN: CPT

## 2020-12-29 PROCEDURE — 97110 THERAPEUTIC EXERCISES: CPT

## 2020-12-29 ASSESSMENT — PAIN DESCRIPTION - FREQUENCY: FREQUENCY: INTERMITTENT

## 2020-12-29 ASSESSMENT — PAIN DESCRIPTION - LOCATION: LOCATION: NECK;HEAD

## 2020-12-29 ASSESSMENT — PAIN DESCRIPTION - DESCRIPTORS: DESCRIPTORS: DULL;BURNING;ACHING

## 2020-12-29 ASSESSMENT — PAIN SCALES - GENERAL: PAINLEVEL_OUTOF10: 2

## 2020-12-29 ASSESSMENT — PAIN DESCRIPTION - PAIN TYPE: TYPE: CHRONIC PAIN

## 2020-12-29 NOTE — PROGRESS NOTES
headaches and cervicothoracic neck pain. Pt had brain and cervical MRI completed prior to PT referral. Pt displays nearly full cervical spine ROM but notes mild tightness and discomfort. Pt had neck muscle weakness via deep cervical flexor test. 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 Pt can benefit from PT services to address her pain and postural weakness for decreased headache symptoms and improved daily activity tolerance. Pt will be provided a progressive HEP to address her noted deficits. Specific instructions for Next Treatment: strength test rhomboids/MT/LT  Prognosis: Excellent  Discharge Recommendations: Continue to assess pending progress      PT Education: PT Role;Plan of Care;Goals; Home Exercise Program  Patient Education: Discussed posture, HEP, PT tx interventions including manual, therEx, and modalities    PLAN: [x] Evaluate and Treat  Frequency/Duration:  Plan  Times per week: 2  Plan weeks: 4  Specific instructions for Next Treatment: strength test rhomboids/MT/LT  Current Treatment Recommendations: ROM, Strengthening, Manual Therapy - Soft Tissue Mobilization, Manual Therapy - Joint Manipulation, Neuromuscular Re-education, Modalities, Patient/Caregiver Education & Training, Home Exercise Program, Integrated Dry Needling     Precautions:    N/A                        Patient Status:[x] Continue/ Initiate plan of Care    [] Discharge PT. Recommend pt continue with HEP. [] Additional visits requested, Please re-certify for additional visits:          Signature: Electronically signed by Emerita Castillo PT on 12/29/20 at 6:13 PM EST      If you have any questions or concerns, please don't hesitate to call. Thank you for your referral.    I have reviewed this plan of care and certify a need for medically necessary rehabilitation services.     Physician Signature:__________________________________________________________  Date:  Please sign and return

## 2020-12-29 NOTE — PROGRESS NOTES
[address]  PHYSICAL THERAPY EVALUATION    Date: 2020  Patient Name: Ivan Valverde       MRN: [id number]   Account: [de-identified]   : 1985  (37 y.o.)   Gender: female   Referring Practitioner: RIANNA Worley                 Diagnosis: Cervicalgia  Treatment Diagnosis: headaches, lower neck/upper back pain, decreased neck muscle strength, decreased posture           Past Medical History:  has a past medical history of GERD (gastroesophageal reflux disease). Past Surgical History:   has a past surgical history that includes [place] tooth extraction (); other surgical history (2012); and EXCISION / BIOPSY SKIN LESION OF TRUNK (N/A, 2019). 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 Pt describes symptoms as sharp pains at posterior occiput, noting symptoms have gradually changed with time to dull burning over the left side of her head. Pt had brain and c-spine MRI completed with results noted above. Pt referred to PT for suspected \"cervicogenic headaches\". 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 Pt takes Aleve, muscle relaxors, and a heating pad PRN for pain relief.        Objective:   Strength Other  Other: Deep Cervical Flexor Test: 25 sec     AROM RUE (degrees)  RUE AROM : WNL  AROM LUE (degrees)  LUE AROM : WNL    Spine  Cervical: Flex: 50, Ext: 75, Rot R: 60 (mild pain), Rot L: 75 (mild pain), SB R: 33 (tight), SB L: 45    Observation/Palpation  Palpation: mild discomfort throughout cervicothoracic junction; no notable muscle tightness throughout cervical spine while pt prone; mild tightness in suboccipital  Observation: mild fwd head, rounded shoulders, thoracic kyphosis when sitting slouched       Exercises:   Exercises  Exercise 1: supine cervical retractions x 10  Exercise 2: s/l thoracic rotations x 10 aminta  Exercise 3: doorway stretch, 90/90: 3x30\"  Exercise 4: scap retractions: 5\" holds x 10  Exercise 5: reviewed cervical roll to improve cervical lordosis in supine  Exercise 6: *UBE  Exercise 7: *rows/lats  Exercise 8: *prone scap  Exercise 9: *cervical isometrics  Exercise 10: *cat/camel stretch  Exercise 11: *aminta wall slides with cervicothoracic ext  Modalities:  Modalities  Moist heat: *cervical PRN  E-stim (parameters): *cervical PRN  Manual:  Manual therapy  Joint mobilization: *thoracic PA mobs  PROM: *cervical PRN  Manual traction: *cervical PRN  Soft Tissue Mobalization: *suboccipital release, cervical/thoracic paraspinals, rhomboids/MT, scalenes, LS, temporalis  Other: *consider IDN with certified therapist  *Indicates exercise,modality, or manual techniques to be initiated when appropriate    Assessment: Body structures, Functions, Activity limitations: Increased pain, Decreased posture  Assessment: Pt is a 36 yo female referred for PT eval for cervicalgia. Pt reports frequent onset of headaches and cervicothoracic neck pain. Pt had brain and cervical MRI completed prior to PT referral. Pt displays nearly full cervical spine ROM but notes mild tightness and discomfort.  Pt had neck muscle weakness via deep cervical flexor test. 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 Pt can benefit from PT services to address her pain and postural weakness for decreased headache symptoms and improved daily activity tolerance. Pt will be provided a progressive HEP to address her noted deficits. Specific instructions for Next Treatment: strength test rhomboids/MT/LT  Prognosis: Excellent  Discharge Recommendations: Continue to assess pending progress        Decision Making: Low Complexity  History: GERD  Exam: NDI: 11/50  Clinical Presentation: stable        Plan  Frequency/Duration:  Plan  Times per week: 2  Plan weeks: 4  Specific instructions for Next Treatment: strength test rhomboids/MT/LT  Current Treatment Recommendations: ROM, Strengthening, Manual Therapy - Soft Tissue Mobilization, Manual Therapy - Joint Manipulation, Neuromuscular Re-education, Modalities, Patient/Caregiver Education & Training, Home Exercise Program, Integrated Dry Needling         Patient Education  New Education Provided: PT Education: PT Role;Plan of Care;Goals; Home Exercise Program  Patient Education: Discussed posture, HEP, PT tx interventions including manual, therEx, and modalities    POST-PAIN     Pain Rating (0-10 pain scale): No change reported  Location and pain description same as pre-treatment unless indicated. Action: [] NA  [] Call Physician  [x] Perform HEP  [x] Meds as prescribed    Evaluation and patient rights have been reviewed and patient agrees with plan of care.   Yes  [x]  No  []   Explain:       [place] Fall Risk Assessment  Risk Factor Scale  Score   History of Falls [] Yes  [x] No 25  0    Secondary Diagnosis [] Yes  [x] No 15  0    Ambulatory Aid [] Furniture  [] Crutches/cane/walker  [x] None/bedrest/wheelchair/nurse 30  15  0    IV/Heparin Lock [] Yes  [x] No 20  0    Gait/Transferring [] Impaired  [] Weak  [x] Normal/bedrest/immobile 20  10  0    Mental Status [] Forgets limitations  [x] Oriented to own ability 15  0       Total: 0     Based on the Assessment score: check the appropriate box. []  No intervention needed   Low =   Score of 0-24  [x]  Use standard prevention interventions Moderate =  Score of 24-44   [] Discuss fall prevention strategies   [] Indicate moderate falls risk on eval  []  Use high risk prevention interventions High = Score of 45 and higher   [] Discuss fall prevention strategies   [] Provide supervision during treatment time    Goals  Long term goals  Time Frame for Long term goals : 4 weeks  Long term goal 1: Pt will be independent and compliant with HEP to self manage symptoms upon D/C  Long term goal 2: Pt will report at 75% decrease in pain & headache symptoms to improve daily activity tolerance. Long term goal 3: Pt will display improved postural awareness requiring no cues for correction throughout PT sessions. Long term goal 4: Pt will drop NDI score to < 5/50 to demo functional improvement. Long term goal 5: Pt will improve deep cervical flexors endurance to >35 sec to demo improved neck strength for postural endurance. Treatment Initiated :  TherEx w/ HEP    PT Individual Minutes  Time In: 1641  Time Out: 8443  Minutes: 50  Timed Code Treatment Minutes: 10 Minutes  Procedure Minutes: 40 min eval     Timed Activity Minutes Units   Ther Ex 10 1       Electronically signed by Sarita Mendenhall, PT on 12/29/20 at 6:11 PM EST

## 2020-12-31 PROBLEM — M54.81 OCCIPITAL NEURALGIA OF LEFT SIDE: Status: ACTIVE | Noted: 2020-12-31

## 2021-01-04 ENCOUNTER — HOSPITAL ENCOUNTER (OUTPATIENT)
Dept: PHYSICAL THERAPY | Age: 36
Setting detail: THERAPIES SERIES
Discharge: HOME | End: 2021-01-04
Payer: COMMERCIAL

## 2021-01-04 PROCEDURE — 97110 THERAPEUTIC EXERCISES: CPT

## 2021-01-04 PROCEDURE — 97140 MANUAL THERAPY 1/> REGIONS: CPT

## 2021-01-04 ASSESSMENT — PAIN DESCRIPTION - LOCATION: LOCATION: NECK

## 2021-01-04 ASSESSMENT — PAIN SCALES - GENERAL: PAINLEVEL_OUTOF10: 5

## 2021-01-04 NOTE — PROGRESS NOTES
[address] [place] [address]  Outpatient Physical Therapy    Treatment Note        Date: 7185  Patient: Andrew Jimenez  :   ACCT #: [de-identified]  Referring Practitioner: RIANNA Milian  Diagnosis: Cervicalgia    Visit Information:  PT Visit Information  Onset Date: (2020)  PT Insurance Information: [facility] [place]  Total # of Visits Approved: 99(BMN)  Total # of Visits to Date: 2  No Show: 0  Canceled Appointment: 0  Progress Note Counter: -    Subjective: Pt reports compliance with exercises given last visit. Reports neck soreness today from decreased activity yesterday. HEP Compliance:  [x] Good [] Fair [] Poor [] Reports not doing due to:    Vital Signs  Patient Currently in Pain: Yes   Pain Screening  Patient Currently in Pain: Yes  Pain Assessment  Pain Level: 5  Pain Location: Neck  Pain Descriptors: Aching    OBJECTIVE:   Exercises  Exercise 1: supine cervical retractions x 10  Exercise 2: s/l thoracic rotations x 10 aminta  Exercise 3: doorway stretch, 90/90: 3x30\"  Exercise 4: scap retractions: 5\" holds x 10; posterior shoulder rolls x10  Exercise 7: Lats/rows YTB x10  Exercise 12: Barrel stretch 3/30sec; modifications for appropriate muscle stretch  Exercise 20: HEP: scapular retractions, cervical retractions, S/L thoracic (given on Eval)- continue current         Strength: [] NT  [x] MMT completed:        Strength RUE  Comment: UT: 3+/5, rhomboids, LT, MT 4-/5  Strength LUE  Comment: UT: 3+/5, rhomboids, LT, MT 4-/5                   Manual:   Manual therapy  Manual traction: Gentle manual traction  Soft Tissue Mobalization: Suboccipital release, STM to cervical/thoracic paraspinals and UT 12' total      *Indicates exercise, modality, or manual techniques to be initiated when appropriate    Assessment:         Body structures, Functions, Activity limitations: Increased pain, Decreased posture  Assessment: Initiated stretching and gentle strengthening activities with good tolerance. Pt performing good technique with exercises given for HEP. Increased tightness and discomfort with palpation along cervical parapsinals and UT region. Pt reporting decrease in pain after manual session, declining modalities. Treatment Diagnosis: headaches, lower neck/upper back pain, decreased neck muscle strength, decreased posture  Prognosis: Excellent       Goals:       Long term goals  Time Frame for Long term goals : 4 weeks  Long term goal 1: Pt will be independent and compliant with HEP to self manage symptoms upon D/C  Long term goal 2: Pt will report at 75% decrease in pain & headache symptoms to improve daily activity tolerance. Long term goal 3: Pt will display improved postural awareness requiring no cues for correction throughout PT sessions. Long term goal 4: Pt will drop NDI score to < 5/50 to demo functional improvement. Long term goal 5: Pt will improve deep cervical flexors endurance to >35 sec to demo improved neck strength for postural endurance. Progress toward goals: Pain, strength    POST-PAIN       Pain Rating (0-10 pain scale):  4 /10   Location and pain description same as pre-treatment unless indicated. Action: [] NA   [] Perform HEP  [] Meds as prescribed  [x] Modalities as prescribed   [] Call Physician     Frequency/Duration:  Plan  Times per week: 2  Plan weeks: 4  Specific instructions for Next Treatment: strength test rhomboids/MT/LT  Current Treatment Recommendations: ROM, Strengthening, Manual Therapy - Soft Tissue Mobilization, Manual Therapy - Joint Manipulation, Neuromuscular Re-education, Modalities, Patient/Caregiver Education & Training, Home Exercise Program, Integrated Dry Needling     Pt to continue current HEP. See objective section for any therapeutic exercise changes, additions or modifications this date.          PT Individual Minutes  Time In: 1501  Time Out: 1540  Minutes: 39  Timed Code Treatment Minutes: 39 Minutes  Procedure Minutes:0   Timed Activity Minutes Units   Ther Ex 27 2   Manual  12 1       Signature:  Electronically signed by Blaze Haro PTA on 1/4/21 at 12:19 PM EST

## 2021-01-07 ENCOUNTER — HOSPITAL ENCOUNTER (OUTPATIENT)
Dept: PHYSICAL THERAPY | Age: 36
Setting detail: THERAPIES SERIES
Discharge: HOME | End: 2021-01-07
Payer: COMMERCIAL

## 2021-01-07 ENCOUNTER — OFFICE VISIT (OUTPATIENT)
Dept: NEUROLOGY | Age: 36
End: 2021-01-07
Payer: COMMERCIAL

## 2021-01-07 VITALS
HEART RATE: 79 BPM | BODY MASS INDEX: 26.26 KG/M2 | DIASTOLIC BLOOD PRESSURE: 83 MMHG | TEMPERATURE: 97.1 F | WEIGHT: 153 LBS | SYSTOLIC BLOOD PRESSURE: 135 MMHG

## 2021-01-07 DIAGNOSIS — M54.81 OCCIPITAL NEURALGIA OF LEFT SIDE: Primary | ICD-10-CM

## 2021-01-07 PROCEDURE — 97110 THERAPEUTIC EXERCISES: CPT

## 2021-01-07 PROCEDURE — 1036F TOBACCO NON-USER: CPT | Performed by: NURSE PRACTITIONER

## 2021-01-07 PROCEDURE — G8417 CALC BMI ABV UP PARAM F/U: HCPCS | Performed by: NURSE PRACTITIONER

## 2021-01-07 PROCEDURE — G8427 DOCREV CUR MEDS BY ELIG CLIN: HCPCS | Performed by: NURSE PRACTITIONER

## 2021-01-07 PROCEDURE — 99213 OFFICE O/P EST LOW 20 MIN: CPT | Performed by: NURSE PRACTITIONER

## 2021-01-07 PROCEDURE — G8484 FLU IMMUNIZE NO ADMIN: HCPCS | Performed by: NURSE PRACTITIONER

## 2021-01-07 PROCEDURE — 97140 MANUAL THERAPY 1/> REGIONS: CPT

## 2021-01-07 PROCEDURE — G0283 ELEC STIM OTHER THAN WOUND: HCPCS

## 2021-01-07 RX ORDER — BACLOFEN 10 MG/1
5 TABLET ORAL 3 TIMES DAILY
COMMUNITY
End: 2022-05-13 | Stop reason: ALTCHOICE

## 2021-01-07 ASSESSMENT — ENCOUNTER SYMPTOMS
WHEEZING: 0
VOMITING: 0
NAUSEA: 0
CHEST TIGHTNESS: 0
SHORTNESS OF BREATH: 0
TROUBLE SWALLOWING: 0
COUGH: 0
COLOR CHANGE: 0

## 2021-01-07 ASSESSMENT — PAIN SCALES - GENERAL: PAINLEVEL_OUTOF10: 4

## 2021-01-07 NOTE — PROGRESS NOTES
[address] [address]  Outpatient Physical Therapy    Treatment Note        Date: 5131  Patient: Santos Jaimes  :   ACCT #: [de-identified]  Referring Practitioner: RIANNA Black  Diagnosis: Cervicalgia    Visit Information:  PT Visit Information  Onset Date: (2020)  PT Insurance Information: Medical [place]  Total # of Visits Approved: 99(BMN)  Total # of Visits to Date: 3  No Show: 0  Canceled Appointment: 0  Progress Note Counter: 3/6-    Subjective: Pt states \"I have more of a headache than pain, it comes and goes throughout the day. \" Pt has not noted any particular \"trigger\" for HA's when they occur. HEP Compliance:  [x] Good [] Fair [] Poor [] Reports not doing due to:    Vital Signs  Patient Currently in Pain: Yes   Pain Screening  Patient Currently in Pain: Yes  Pain Assessment  Pain Assessment: 0-10  Pain Level: 4(4-5)  Pain Location: Neck;Head    OBJECTIVE:   Exercises  Exercise 1: supine cervical retractions x 10  Exercise 2: s/l thoracic rotations 5\" x 10 aminta  Exercise 3: doorway stretch, 90/90: 3x30\"  Exercise 4: scap retractions: 5\" holds x 10; posterior shoulder rolls x10  Exercise 6: UBE L1 2.5F/R  Exercise 7: Lats/rows YTB x10  Exercise 11: b/l wall slides w/ ball 5\"x10  Exercise 12: Barrel stretch 3/30sec; modifications for appropriate muscle stretch  Exercise 20: HEP: corner stretch, cat/cow, lats/rows w/ YTB    Strength: [x] NT  [] MMT completed:     ROM: [x] NT  [] ROM measurements:       Manual:   Manual therapy  Manual traction: Gentle manual traction  Soft Tissue Mobalization: Suboccipital release, STM to cervical/thoracic paraspinals and UT 10' total    Modalities:  Modalities  Moist heat: MH w/ IFC to neck w/ IFC 10 min  E-stim (parameters): IFC to cervical spine/B UT's 10 min     *Indicates exercise, modality, or manual techniques to be initiated when appropriate    Assessment:     Body structures, Functions, Activity limitations: Increased pain, Decreased posture  Assessment: Progressed current program w/ focus on postural awareness and endurance to ease cervical strain and HA frequency. Pt tolerating exs w/o complaint inc pain/HA however did note in tension specific to B UT's w/ most exs. Pt denied being  TTP during STM w/ complete relief of HA following manual traction. Concluded tx w/ IFC and MH post tx to reduce mm tightness following ther ex. Cont'd to progress HEP reflective of current PT program w/ good understanding. Treatment Diagnosis: headaches, lower neck/upper back pain, decreased neck muscle strength, decreased posture  Prognosis: Excellent     Goals:   Long term goals  Time Frame for Long term goals : 4 weeks  Long term goal 1: Pt will be independent and compliant with HEP to self manage symptoms upon D/C  Long term goal 2: Pt will report at 75% decrease in pain & headache symptoms to improve daily activity tolerance. Long term goal 3: Pt will display improved postural awareness requiring no cues for correction throughout PT sessions. Long term goal 4: Pt will drop NDI score to < 5/50 to demo functional improvement. Long term goal 5: Pt will improve deep cervical flexors endurance to >35 sec to demo improved neck strength for postural endurance. Progress toward goals: postural strength    POST-PAIN       Pain Rating (0-10 pain scale):   0/10   Location and pain description same as pre-treatment unless indicated. Action: [x] NA   [] Perform HEP  [] Meds as prescribed  [] Modalities as prescribed   [] Call Physician     Frequency/Duration:  Plan  Times per week: 2  Plan weeks: 4  Current Treatment Recommendations: ROM, Strengthening, Manual Therapy - Soft Tissue Mobilization, Manual Therapy - Joint Manipulation, Neuromuscular Re-education, Modalities, Patient/Caregiver Education & Training, Home Exercise Program, Integrated Dry Needling     Pt to continue current HEP.   See objective section for any therapeutic exercise changes, additions or modifications this date.   PT Individual Minutes  Time In: 0271  Time Out: 6645  Minutes: 53  Timed Code Treatment Minutes: 43 Minutes  Procedure Minutes: 10 min (MH/ES)     Timed Activity Minutes Units   Ther Ex 33 2   Manual  10 1       Signature:  Electronically signed by Yesika Mcdonnell PTA on 1/7/21 at 6:04 PM EST

## 2021-01-07 NOTE — PROGRESS NOTES
Subjective:      Patient ID: Deyanira Leija is a 37 y.o. female who presents today for:  Chief Complaint   Patient presents with    Follow-up     Patient states that things are adelfo gpretty good no changes. She says the headaches have been less, and when she does get one they are not as bad as they were. She says that she started physical therapy last week as well. HPI  Pt seen and examined in the office for follow up  1/7/20:  Patient seen and examined for 1 month follow-up for occipital neuralgia. She was last seen on 12/3/2020. At that time she was initiated on baclofen 5 mg 3 times a day. MRI of the brain and MRI of the cervical spine were ordered. These were done on 12/18/2020. MRI of the brain showed No acute intracranial changes and no regions of signal abnormality. MRI of the cervical spine showed multilevel spondylosis and mild disc space narrowing at each level. No central canal narrowing or neural foraminal narrowing at any level. Patient presents today and reports that overall her symptoms are much improved. She reports that she has pain to the left occipital region approximately once weekly that is less severe in nature. She reports the pain comes on gradually and goes away gradually. She is tolerating baclofen without adverse effect. She is only taking it at at bedtime as it made her too sleepy during the day. She is inquiring about weaning off of the baclofen. She has initiated physical therapy.       12/3/20: Pt seen and examined in the office to establish care for headaches. Patient is a 73-year-old  female with past medical history of GERD who was referred by primary care provider for headaches. Patient reports that she began having headaches in September 2020. She states she would not describe them as a typical headache. She reports that initially in September she was having attacks multiple times a day of sharp shooting pains into the back left of her head in the occipital region. She states this would happen up to 5-7 times per day. She states that that was sharp and quick and would then resolve. She reports the episodes have decreased and now she is having them approximately twice a week with some tenderness over the region in between. She states episodes last for a few minutes at a time. She denies any recent or remote head or neck trauma. He denies any unusual rashes of the scalp. She is very active and works out frequently but reports she stretches before and after working out. She does have some pain over the lower cervical spine region. No point tenderness. No radiculopathy into the arms or weakness of upper extremities. She denies any decreased range of motion of neck. She denies muscle spasms. She does not take anything to help with the pain as she reports that the episodes do not last long enough to require medication. She is unsure if anything triggers this. She has been being treated recently for chronic sinusitis with nasal spray and Advil Cold and Sinus and reports that she has seen some improvement with this. She reports that she is also had some episodes of dizziness that mainly occur with activity such as working out or bending over. She denies any tinnitus, otalgia, hearing changes. Patient denies history of seizure or stroke. She has not had any unusual or unplanned weight loss or recent illness or fever.   Denies double vision, gait ataxia, falls, dysphagia, dysarthria, unusual rashes or arthralgias, nausea vomiting, photophobia, phonophobia, vision changes. Patient does have some generalized anxiety and is experience some stress related to work. She does clench her teeth. Denies depression. She reports that she wakes up frequently in the night. Denies snoring. Reports she is a social drinker. Denies tobacco or illicit drug use. Patient did have laboratory testing done on 9/25/2020 including vitamin D, vitamin B12, TSH, CBC and CMP that were all unremarkable.   Past Medical History:   Diagnosis Date    GERD (gastroesophageal reflux disease)      Past Surgical History:   Procedure Laterality Date    EXCISION / BIOPSY SKIN LESION OF TRUNK N/A 4/5/2019    EXCISION OF CHEST WALL MASS X2 performed by Laura Sam MD at [place] [address]  8/2012    mole removal on back left side     WISDOM TOOTH EXTRACTION  2005     Social History     Socioeconomic History    Marital status: Single     Spouse name: Not on file    Number of children: Not on file    Years of education: Not on file    Highest education level: Not on file   Occupational History    Not on file   Social Needs    Financial resource strain: Not hard at all   [employer] insecurity     Worry: Never true     Inability: Never true   [place] needs     Medical: No     Non-medical: No   Tobacco Use    Smoking status: Former Smoker     Packs/day: 0.25     Years: 9.00     Pack years: 2.25     Types: Cigarettes     Quit date: 7/24/2017     Years since quitting: 3.4    Smokeless tobacco: Never Used    Tobacco comment: 4 cigs daily  2/4/15   Substance and Sexual Activity    Alcohol use: Yes     Comment: occassional    Drug use: No    Sexual activity: Not Currently     Partners: Male     Birth control/protection: Condom   Lifestyle    Physical activity     Days per week: Not on file     Minutes per session: Not on file    Stress: Not on file   Relationships    Social connections Talks on phone: Not on file     Gets together: Not on file     Attends Mandaeism service: Not on file     Active member of club or organization: Not on file     Attends meetings of clubs or organizations: Not on file     Relationship status: Not on file    Intimate partner violence     Fear of current or ex partner: Not on file     Emotionally abused: Not on file     Physically abused: Not on file     Forced sexual activity: Not on file   Other Topics Concern    Not on file   Social History Narrative    Not on file     Family History   Problem Relation Age of Onset    High Blood Pressure Mother     No Known Problems Father     Cancer Paternal Aunt         rigoberto lymp    Heart Disease Maternal Grandfather         bi pass    Stroke Maternal Grandfather         8/2012 mini stroke    Diabetes Maternal Grandfather     Cancer Paternal Grandmother         lymphomia    Diabetes Paternal Grandfather      Allergies   Allergen Reactions    Dextromethorphan-Guaifenesin Other (See Comments)     Weak, shaky     Current Outpatient Medications on File Prior to Visit   Medication Sig Dispense Refill    baclofen (LIORESAL) 10 MG tablet Take 5 mg by mouth 3 times daily Patient only taking once a day at bedtime.  norethindrone-ethinyl estradiol-Fe (LO LOESTRIN FE) 1 MG-10 MCG / 10 MCG tablet Take 1 tablet by mouth daily 84 tablet 3    loratadine (CLARITIN) 10 MG tablet Take 1 tablet by mouth daily 30 tablet 5    Biotin 1 MG CAPS Take by mouth      fluticasone (FLONASE) 50 MCG/ACT nasal spray 1 spray by Each Nostril route daily 2 Bottle 1    Multiple Vitamins-Minerals (ONE-A-DAY WOMENS VITACRAVES) CHEW Take by mouth      Lansoprazole (PREVACID PO) Take 1 tablet by mouth daily.  Probiotic Product (PROBIOTIC-10 PO) Take by mouth       No current facility-administered medications on file prior to visit. Review of Systems   Constitutional: Negative for appetite change, chills, fatigue and fever. HENT: Negative for hearing loss and trouble swallowing. Eyes: Negative for visual disturbance. Respiratory: Negative for cough, chest tightness, shortness of breath and wheezing. Cardiovascular: Negative for chest pain, palpitations and leg swelling. Gastrointestinal: Negative for nausea and vomiting. Musculoskeletal: Negative for gait problem. Skin: Negative for color change and rash. Neurological: Positive for headaches. Negative for dizziness, tremors, seizures, syncope, facial asymmetry, speech difficulty, weakness, light-headedness and numbness. Psychiatric/Behavioral: Negative for agitation, confusion and hallucinations. The patient is not nervous/anxious. Objective:   /83 (Site: Left Upper Arm, Position: Sitting, Cuff Size: Medium Adult)   Pulse 79   Temp 97.1 °F (36.2 °C)   Wt 153 lb (69.4 kg)   BMI 26.26 kg/m²     Physical Exam  Vitals signs reviewed. Constitutional:       General: She is not in acute distress. Appearance: She is not ill-appearing or diaphoretic. HENT:      Head: Normocephalic and atraumatic. Eyes:      General: No visual field deficit. Extraocular Movements: Extraocular movements intact. Pupils: Pupils are equal, round, and reactive to light. Cardiovascular:      Rate and Rhythm: Normal rate and regular rhythm. Pulmonary:      Effort: Pulmonary effort is normal. No respiratory distress. Breath sounds: Normal breath sounds. Abdominal:      General: Bowel sounds are normal.      Palpations: Abdomen is soft. Skin:     General: Skin is warm and dry. Neurological:      Mental Status: She is alert and oriented to person, place, and time. Cranial Nerves: No cranial nerve deficit, dysarthria or facial asymmetry. Motor: No weakness, tremor or seizure activity. Coordination: Coordination normal.      Gait: Gait normal.         No results found.     Lab Results   Component Value Date    WBC 6.0 09/25/2020

## 2021-01-12 ENCOUNTER — HOSPITAL ENCOUNTER (OUTPATIENT)
Dept: PHYSICAL THERAPY | Age: 36
Setting detail: THERAPIES SERIES
Discharge: HOME | End: 2021-01-12
Payer: COMMERCIAL

## 2021-01-12 PROCEDURE — 97110 THERAPEUTIC EXERCISES: CPT

## 2021-01-12 NOTE — PROGRESS NOTES
[address] [address]  Outpatient Physical Therapy    Treatment Note        Date:   Patient: Cristi Quinones  :   ACCT #: [de-identified]  Referring Practitioner: RIANNA Buck  Diagnosis: Cervicalgia    Visit Information:  PT Visit Information  Onset Date: (2020)  PT Insurance Information: Medical [place]  Total # of Visits Approved: 99(BMN)  Total # of Visits to Date: 4  No Show: 0  Canceled Appointment: 0  Progress Note Counter: -    Subjective: Pt denies pain or headache at arrival. States she has felt pretty good over the last couple days with last headache reported on Friday. Pt says she tried cervical IFC last visit noting it felt good with the MHP. HEP Compliance:  [x] Good [] Fair [] Poor [] Reports not doing due to:    Vital Signs  Patient Currently in Pain: Denies   Pain Screening  Patient Currently in Pain: Denies    OBJECTIVE:   Exercises  Exercise 1: Seated cervical retractions x 10  Exercise 2: s/l thoracic rotations x 10 aminta  Exercise 6: UBE L2.0 x 6 min, alternating ea min  Exercise 7: Lats/Rows: RTB, 2x15 ea  Exercise 8: 3-way chest pulls (thumbs in, up, out) x 10 ea, YTB  Exercise 9: bow and arrow: YTB x 1  Exercise 10: cat/camel stretch x 10 transitions  Exercise 13: BOSU knee planks w/ side-to-side rocking: 5x 40\" holds (focus on scap retraction throughout)  Exercise 14: CC cross body chops: 3 plates, 7R49 aminta  Exercise 20: HEP: cont current    Strength: [x] NT  [] MMT completed:    ROM: [x] NT  [] ROM measurements:     Modalities:  Modalities  Other: Declined this date; no post-tx pain     *Indicates exercise, modality, or manual techniques to be initiated when appropriate    Assessment: Body structures, Functions, Activity limitations: Increased pain, Decreased posture  Assessment: Contd POC with focus on postural strength and neck muscle endurance. Pt displayed good tolerance to all exercises including new progressions.  Pt reports compliance with HEP. Reported frequency of headaches is decreasing per pt. Anticipate D/C at original POC end date. Treatment Diagnosis: headaches, lower neck/upper back pain, decreased neck muscle strength, decreased posture  Prognosis: Excellent       Goals:     Long term goals  Time Frame for Long term goals : 4 weeks  Long term goal 1: Pt will be independent and compliant with HEP to self manage symptoms upon D/C  Long term goal 2: Pt will report at 75% decrease in pain & headache symptoms to improve daily activity tolerance. Long term goal 3: Pt will display improved postural awareness requiring no cues for correction throughout PT sessions. Long term goal 4: Pt will drop NDI score to < 5/50 to demo functional improvement. Long term goal 5: Pt will improve deep cervical flexors endurance to >35 sec to demo improved neck strength for postural endurance. Progress toward goals: progressing toward all     POST-PAIN       Pain Rating (0-10 pain scale):   0/10   Location and pain description same as pre-treatment unless indicated. Action: [] NA   [x] Perform HEP  [] Meds as prescribed  [x] Modalities as prescribed   [] Call Physician     Frequency/Duration:  Plan  Times per week: 2  Plan weeks: 4  Current Treatment Recommendations: ROM, Strengthening, Manual Therapy - Soft Tissue Mobilization, Manual Therapy - Joint Manipulation, Neuromuscular Re-education, Modalities, Patient/Caregiver Education & Training, Home Exercise Program, Integrated Dry Needling     Pt to continue current HEP. See objective section for any therapeutic exercise changes, additions or modifications this date.        PT Individual Minutes  Time In: 5678  Time Out: 9444  Minutes: 40  Timed Code Treatment Minutes: 40 Minutes  Procedure Minutes: 0     Timed Activity Minutes Units   Ther Ex 40 3       Signature:  Electronically signed by Emanuel Pulliam PT on 1/12/21 at 5:51 PM EST

## 2021-01-14 ENCOUNTER — HOSPITAL ENCOUNTER (OUTPATIENT)
Dept: PHYSICAL THERAPY | Age: 36
Setting detail: THERAPIES SERIES
Discharge: HOME | End: 2021-01-14
Payer: COMMERCIAL

## 2021-01-14 NOTE — PROGRESS NOTES
[address]       Physical Therapy  Cancellation/No-show Note  Patient Name:  Joni Guajardo  :  [id number]   Date:  2021  Referring Practitioner: RIANNA Herron  Diagnosis: Cervicalgia    Visit Information:  PT Visit Information  Onset Date: (2020)  PT Insurance Information: Medical [place]  Total # of Visits Approved: 99(BMN)  Total # of Visits to Date: 4  No Show: 0  Canceled Appointment: 1  Progress Note Counter: -8 CX 21    For today's appointment patient:  [x]  Cancelled  []  Rescheduled appointment  []  No-show   []  Called pt to remind of next appointment     Reason given by patient:  []  Patient ill  []  Conflicting appointment  []  No transportation    [x]  Conflict with work  []  No reason given  []  Other:       Signature: Electronically signed by Shital Johnson PTA on 21 at 3:41 PM EST

## 2021-01-19 ENCOUNTER — HOSPITAL ENCOUNTER (OUTPATIENT)
Dept: PHYSICAL THERAPY | Age: 36
Setting detail: THERAPIES SERIES
Discharge: HOME | End: 2021-01-19
Payer: COMMERCIAL

## 2021-01-19 PROCEDURE — G0283 ELEC STIM OTHER THAN WOUND: HCPCS

## 2021-01-19 PROCEDURE — 97140 MANUAL THERAPY 1/> REGIONS: CPT

## 2021-01-19 ASSESSMENT — PAIN DESCRIPTION - PAIN TYPE: TYPE: ACUTE PAIN

## 2021-01-19 NOTE — PROGRESS NOTES
[address]     Ph: [phone]  Fax: [phone]    [] Certification  [] Recertification []  Plan of Care  [] Progress Note [x] Discharge      To: DOLORES Haji-CNP      From:  Mekhi Reece, PT  Patient: Quinten Blount     : [id number]  Diagnosis: Cervicalgia     Date: 2021     Progress Report Period from:  2020  to 2021    Total # of Visits to Date: 5   No Show: 0    Canceled Appointment: 1     OBJECTIVE:   Long Term Goals - Time Frame for Long term goals : 4 weeks  Goals Current/ Discharge status Met   Long term goal 1: Pt will be independent and compliant with HEP to self manage symptoms upon D/C Independent and compliant [x] yes  [] no   Long term goal 2: Pt will report at 75% decrease in pain & headache symptoms to improve daily activity tolerance. Pain ranging 0-8/10 throughout PT sessions. [] yes  [x] no   Long term goal 3: Pt will display improved postural awareness requiring no cues for correction throughout PT sessions. Improved awareness displayed; self corrects w/o cues [x] yes  [] no   Long term goal 4: Pt will drop NDI score to < 5/50 to demo functional improvement. Exam: NDI: 9/50   [] yes  [x] no   Long term goal 5: Pt will improve deep cervical flexors endurance to >35 sec to demo improved neck strength for postural endurance. Strength Other  Other: Deep Cervical Flexor Test: 43 sec   [x] yes  [] no        Assessment: Pt participated in 5 PT visits to address cervicothoracic neck pain and headaches. Throught POC pt reported intermit c/o of short duration headaches and occasional neck pain. Pt was educated on individualized HEP for neck stretches, postural strengthening, and other symptom mgmt techniques. At this time pt requests PT discharge with independent completion of HEP.       PLAN:   Frequency/Duration:  Plan  Plan Comment: D/C PT POC     Precautions:     N/A Patient Status:[] Continue/ Initiate plan of Care    [x] Discharge PT. Recommend pt continue with HEP. [] Additional visits requested, Please re-certify for additional visits:          Signature: Electronically signed by Joe Parekh PT on 1/19/21 at 6:04 PM EST      If you have any questions or concerns, please don't hesitate to call. Thank you for your referral.    I have reviewed this plan of care and certify a need for medically necessary rehabilitation services.     Physician Signature:__________________________________________________________  Date:  Please sign and return

## 2021-01-19 NOTE — PROGRESS NOTES
[address] [address]  Outpatient Physical Therapy    Treatment Note        Date: 432  Patient: Joni Guajardo  :   ACCT #: [de-identified]  Referring Practitioner: RIANNA Herron  Diagnosis: Cervicalgia    Visit Information:  PT Visit Information  Onset Date: (2020)  PT Insurance Information: Medical [place]  Total # of Visits Approved: 99(BMN)  Total # of Visits to Date: 5  No Show: 0  Canceled Appointment: 1  Progress Note Counter: -    Subjective: Pt reports 8/10 neck pain at arrival, noting symptoms began after sleeping on the couch 3 days in a row at a friends house.   Comments: Pt requesting PT discharge following today's visit  HEP Compliance:  [x] Good [] Fair [] Poor [] Reports not doing due to:    Vital Signs  Patient Currently in Pain: Yes   Pain Screening  Patient Currently in Pain: Yes  Pain Assessment  Pain Assessment: 0-10  Pain Level: 8  Pain Type: Acute pain  Pain Location: (cervicothoracic junction)  Pain Descriptors: Aching(stiff)    OBJECTIVE:   Exercises  Exercise 1: ROM measures  Exercise 2: supine cervical retractions x 10  Exercise 3: supine cervical rotations x 10  Exercise 4: deep cervical flexor test: 43 sec hold  Exercise 5: reviewed full HEP; discussed desired sets/reps/frequency of completion    Strength: [] NT  [] MMT completed:  Strength Other  Other: Deep Cervical Flexor Test: 43 sec    ROM: [] NT  [] ROM measurements:  AROM RUE (degrees)  RUE AROM : WNL     AROM LUE (degrees)  LUE AROM : WNL  Spine  Cervical: WNL in all planes; mild discomfort noted with Rot L while sitting, no pain while supine     Manual:   Manual therapy  Joint mobilization: cervical and upper thoracic PA mobs at SP and aminta TPs, Grade I-III  Soft Tissue Mobalization: STM aminta UT and cervicothoracic paraspinals  Other: Manual total x 12 min    Modalities:  Modalities  Moist heat: MH w/ IFC to neck w/ IFC 10 min  E-stim (parameters): IFC to cervicothoracic junction x 10 min *Indicates exercise, modality, or manual techniques to be initiated when appropriate    Assessment:   Assessment: Utilized manual, light TE and modalities to address neck stiffness and pain reported at arrival. Pt displayed full cervical ROM in all functional planes, noting discomfort while sitting but pain free while supine. At this time pt requests PT discharge with independent completion of HEP. All goals reviewed with pt and assessed for D/C. NDI: 9/50    Goals:     Long term goals  Time Frame for Long term goals : 4 weeks  Long term goal 1: Pt will be independent and compliant with HEP to self manage symptoms upon D/C  Long term goal 2: Pt will report at 75% decrease in pain & headache symptoms to improve daily activity tolerance. Long term goal 3: Pt will display improved postural awareness requiring no cues for correction throughout PT sessions. Long term goal 4: Pt will drop NDI score to < 5/50 to demo functional improvement. Long term goal 5: Pt will improve deep cervical flexors endurance to >35 sec to demo improved neck strength for postural endurance. Progress toward goals: see d/c note    POST-PAIN       Pain Rating (0-10 pain scale):   6/10   Location and pain description same as pre-treatment unless indicated. Action: [] NA   [x] Perform HEP  [x] Meds as prescribed  [x] Modalities as prescribed   [] Call Physician     Frequency/Duration:  Plan  Plan Comment: D/C PT POC     Pt to continue current HEP. See objective section for any therapeutic exercise changes, additions or modifications this date.        PT Individual Minutes  Time In: 1708  Time Out: 9508  Minutes: 37  Timed Code Treatment Minutes: 20 Minutes  Procedure Minutes: 10 min MHP/E-stim     Timed Activity Minutes Units   Ther Ex 8 0   Manual  12 1       Signature:  Electronically signed by Tori Luis PT on 1/19/21 at 5:59 PM EST

## 2021-04-19 ENCOUNTER — OFFICE VISIT (OUTPATIENT)
Dept: FAMILY MEDICINE CLINIC | Age: 36
End: 2021-04-19
Payer: COMMERCIAL

## 2021-04-19 VITALS
TEMPERATURE: 97.5 F | OXYGEN SATURATION: 99 % | HEART RATE: 80 BPM | DIASTOLIC BLOOD PRESSURE: 82 MMHG | SYSTOLIC BLOOD PRESSURE: 136 MMHG

## 2021-04-19 DIAGNOSIS — J02.9 ACUTE PHARYNGITIS, UNSPECIFIED ETIOLOGY: Primary | ICD-10-CM

## 2021-04-19 DIAGNOSIS — J02.9 ACUTE PHARYNGITIS, UNSPECIFIED ETIOLOGY: ICD-10-CM

## 2021-04-19 LAB — S PYO AG THROAT QL: NORMAL

## 2021-04-19 PROCEDURE — 1036F TOBACCO NON-USER: CPT | Performed by: STUDENT IN AN ORGANIZED HEALTH CARE EDUCATION/TRAINING PROGRAM

## 2021-04-19 PROCEDURE — 99213 OFFICE O/P EST LOW 20 MIN: CPT | Performed by: STUDENT IN AN ORGANIZED HEALTH CARE EDUCATION/TRAINING PROGRAM

## 2021-04-19 PROCEDURE — G8417 CALC BMI ABV UP PARAM F/U: HCPCS | Performed by: STUDENT IN AN ORGANIZED HEALTH CARE EDUCATION/TRAINING PROGRAM

## 2021-04-19 PROCEDURE — 87880 STREP A ASSAY W/OPTIC: CPT | Performed by: STUDENT IN AN ORGANIZED HEALTH CARE EDUCATION/TRAINING PROGRAM

## 2021-04-19 PROCEDURE — G8427 DOCREV CUR MEDS BY ELIG CLIN: HCPCS | Performed by: STUDENT IN AN ORGANIZED HEALTH CARE EDUCATION/TRAINING PROGRAM

## 2021-04-19 RX ORDER — AMOXICILLIN 500 MG/1
500 CAPSULE ORAL 2 TIMES DAILY
Qty: 20 CAPSULE | Refills: 0 | Status: SHIPPED | OUTPATIENT
Start: 2021-04-19 | End: 2021-04-29

## 2021-04-19 ASSESSMENT — ENCOUNTER SYMPTOMS
ABDOMINAL PAIN: 0
SHORTNESS OF BREATH: 0
SORE THROAT: 1
COUGH: 0
VOMITING: 0
SINUS PRESSURE: 0

## 2021-04-19 ASSESSMENT — PATIENT HEALTH QUESTIONNAIRE - PHQ9
SUM OF ALL RESPONSES TO PHQ QUESTIONS 1-9: 0
SUM OF ALL RESPONSES TO PHQ9 QUESTIONS 1 & 2: 0
SUM OF ALL RESPONSES TO PHQ QUESTIONS 1-9: 0
2. FEELING DOWN, DEPRESSED OR HOPELESS: 0

## 2021-04-19 NOTE — PATIENT INSTRUCTIONS

## 2021-04-19 NOTE — PROGRESS NOTES
[address] [place] [address] (:  ) is a 37 y.o. female, here for evaluation of the following medical concerns:  Chief Complaint   Patient presents with    Pharyngitis     x4 days     Otalgia     x5 days. HPI  Sore throat  Symptoms started 4 days ago  Endorsing ear pain bilateral   No fevers, chills, nasal congestion  Getting worse over time  No recent sick contacts    Hx seasonal allergies  No hx frequent strep throat in the past    Review of Systems   Constitutional: Negative for chills and fever. HENT: Positive for ear pain and sore throat. Negative for congestion and sinus pressure. Respiratory: Negative for cough and shortness of breath. Cardiovascular: Negative for chest pain and palpitations. Gastrointestinal: Negative for abdominal pain and vomiting. Musculoskeletal: Negative for arthralgias and myalgias. Skin: Negative for rash and wound. Neurological: Negative for speech difficulty and light-headedness. Psychiatric/Behavioral: Negative for suicidal ideas. The patient is not nervous/anxious. Prior to Visit Medications    Medication Sig Taking? Authorizing Provider   amoxicillin (AMOXIL) 500 MG capsule Take 1 capsule by mouth 2 times daily for 10 days Yes Wander Lindsay DO   baclofen (LIORESAL) 10 MG tablet Take 5 mg by mouth 3 times daily Patient only taking once a day at bedtime.  Yes Historical Provider, MD   norethindrone-ethinyl estradiol-Fe (LO LOESTRIN FE) 1 MG-10 MCG / 10 MCG tablet Take 1 tablet by mouth daily Yes Zelda Delcid MD   loratadine (CLARITIN) 10 MG tablet Take 1 tablet by mouth daily Yes DOLORES Lyle CNP   Biotin 1 MG CAPS Take by mouth Yes Historical Provider, MD   fluticasone (FLONASE) 50 MCG/ACT nasal spray 1 spray by Each Nostril route daily Yes DOLORES Lyle CNP   Probiotic Product (PROBIOTIC-10 PO) Take by mouth Yes Historical Provider, MD   Multiple Vitamins-Minerals (ONE-A-DAY WOMENS VITACRAVES) CHEW Take by mouth Yes Historical Provider, MD   Lansoprazole (PREVACID PO) Take 1 tablet by mouth daily.  Yes Historical Provider, MD        Allergies   Allergen Reactions    Dextromethorphan-Guaifenesin Other (See Comments)     camacho Roland       Past Medical History:   Diagnosis Date    GERD (gastroesophageal reflux disease)        Past Surgical History:   Procedure Laterality Date    EXCISION / BIOPSY SKIN LESION OF TRUNK N/A 4/5/2019    EXCISION OF CHEST WALL MASS X2 performed by Arcenio Warner MD at [address] [place] [address]  8/2012    mole removal on back left side     WISDOM TOOTH EXTRACTION  2005       Social History     Socioeconomic History    Marital status: Single     Spouse name: Not on file    Number of children: Not on file    Years of education: Not on file    Highest education level: Not on file   Occupational History    Not on file   Social Needs    Financial resource strain: Not hard at all   [employer] insecurity     Worry: Never true     Inability: Never true   [employer] needs     Medical: No     Non-medical: No   Tobacco Use    Smoking status: Former Smoker     Packs/day: 0.25     Years: 9.00     Pack years: 2.25     Types: Cigarettes     Quit date: 7/24/2017     Years since quitting: 3.7    Smokeless tobacco: Never Used    Tobacco comment: 4 cigs daily  2/4/15   Substance and Sexual Activity    Alcohol use: Yes     Comment: occassional    Drug use: No    Sexual activity: Not Currently     Partners: Male     Birth control/protection: Condom   Lifestyle    Physical activity     Days per week: Not on file     Minutes per session: Not on file    Stress: Not on file   Relationships    Social connections     Talks on phone: Not on file     Gets together: Not on file     Attends Evangelical service: Not on file     Active member of club or organization: Not on file     Attends meetings of clubs or organizations: Not on file     Relationship status: Not on file    Intimate partner Cardiovascular:      Rate and Rhythm: Normal rate and regular rhythm. Pulses: Normal pulses. Heart sounds: No murmur. Pulmonary:      Effort: Pulmonary effort is normal. No respiratory distress. Breath sounds: Normal breath sounds. Abdominal:      General: Abdomen is flat. Bowel sounds are normal.      Palpations: Abdomen is soft. Tenderness: There is no abdominal tenderness. There is no guarding or rebound. Lymphadenopathy:      Cervical: No cervical adenopathy. Skin:     General: Skin is warm. Capillary Refill: Capillary refill takes less than 2 seconds. Findings: No lesion or rash. Neurological:      General: No focal deficit present. Mental Status: She is alert and oriented to person, place, and time. Mental status is at baseline. Psychiatric:         Mood and Affect: Mood normal.         Thought Content: Thought content normal.         Judgment: Judgment normal.         ASSESSMENT/PLAN:  1. Acute pharyngitis, unspecified etiology  - Will treat with abx at this time as symptoms started 5 days ago and worsening  - Rapid strep negative in office, throat culture sent to hospital  - Pt to call if symptoms worsen or do not improve  - Discussed tylenol, rest and plenty of fluids    - POCT rapid strep A  - Culture, Throat; Future  - amoxicillin (AMOXIL) 500 MG capsule; Take 1 capsule by mouth 2 times daily for 10 days  Dispense: 20 capsule; Refill: 0      ---------------------------------------------------------------------  Side effects, adverse effects of the medication prescribed today, as well as treatment plan/ rationale and result expectations have been discussed with the patient who expresses understanding and desires to proceed. Close follow up to evaluate treatment results and for coordination of care. I have reviewed the patient's medical history in detail and updated the computerized patient record.      As always, patient is advised that if symptoms worsen in any way they will proceed to the nearest emergency room. --------------------------------------------------------------------    Return if symptoms worsen or fail to improve. An  electronic signature was used to authenticate this note.     --Kalen Rivers DO on 4/19/2021 at 12:11 PM

## 2021-04-22 LAB — THROAT CULTURE: NORMAL

## 2021-08-10 DIAGNOSIS — J30.9 ALLERGIC RHINITIS, UNSPECIFIED SEASONALITY, UNSPECIFIED TRIGGER: ICD-10-CM

## 2021-08-10 RX ORDER — LORATADINE 10 MG/1
10 TABLET ORAL DAILY
Qty: 30 TABLET | Refills: 0 | Status: SHIPPED | OUTPATIENT
Start: 2021-08-10 | End: 2021-12-10

## 2021-12-10 ENCOUNTER — OFFICE VISIT (OUTPATIENT)
Dept: OBGYN CLINIC | Age: 36
End: 2021-12-10
Payer: COMMERCIAL

## 2021-12-10 VITALS
WEIGHT: 160 LBS | SYSTOLIC BLOOD PRESSURE: 114 MMHG | HEIGHT: 64 IN | BODY MASS INDEX: 27.31 KG/M2 | DIASTOLIC BLOOD PRESSURE: 76 MMHG

## 2021-12-10 DIAGNOSIS — Z01.419 WOMEN'S ANNUAL ROUTINE GYNECOLOGICAL EXAMINATION: Primary | ICD-10-CM

## 2021-12-10 DIAGNOSIS — Z11.51 SCREENING FOR HUMAN PAPILLOMAVIRUS: ICD-10-CM

## 2021-12-10 DIAGNOSIS — Z11.3 ROUTINE SCREENING FOR STI (SEXUALLY TRANSMITTED INFECTION): ICD-10-CM

## 2021-12-10 PROCEDURE — G8484 FLU IMMUNIZE NO ADMIN: HCPCS | Performed by: OBSTETRICS & GYNECOLOGY

## 2021-12-10 PROCEDURE — 99395 PREV VISIT EST AGE 18-39: CPT | Performed by: OBSTETRICS & GYNECOLOGY

## 2021-12-10 RX ORDER — CETIRIZINE HYDROCHLORIDE 10 MG/1
10 TABLET ORAL DAILY
COMMUNITY

## 2021-12-10 RX ORDER — NORETHINDRONE ACETATE AND ETHINYL ESTRADIOL, ETHINYL ESTRADIOL AND FERROUS FUMARATE 1MG-10(24)
1 KIT ORAL DAILY
Qty: 84 TABLET | Refills: 3 | Status: SHIPPED | OUTPATIENT
Start: 2021-12-10

## 2021-12-10 ASSESSMENT — ENCOUNTER SYMPTOMS
RESPIRATORY NEGATIVE: 1
BLOOD IN STOOL: 0
ABDOMINAL PAIN: 0
VOMITING: 0
CONSTIPATION: 0
EYES NEGATIVE: 1
NAUSEA: 0
DIARRHEA: 0
ALLERGIC/IMMUNOLOGIC NEGATIVE: 1
RECTAL PAIN: 0
ANAL BLEEDING: 0
ABDOMINAL DISTENTION: 0

## 2021-12-10 ASSESSMENT — VISUAL ACUITY: OU: 1

## 2021-12-10 NOTE — PROGRESS NOTES
Subjective:      Patient ID: Mariluz Wei is a 31 y.o. female    Annual exam.  No GYN complaints. Normal cycles. Pap performed. STD screening offered. Monthly SBE encouraged. F/U annual or prn. Vitals: There were no vitals taken for this visit. Past Medical History:   Diagnosis Date    GERD (gastroesophageal reflux disease)      Past Surgical History:   Procedure Laterality Date    EXCISION / BIOPSY SKIN LESION OF TRUNK N/A 2019    EXCISION OF CHEST WALL MASS X2 performed by Jcarlos Llanes MD at [address] [place] [address]  2012    mole removal on back left side     WISDOM TOOTH EXTRACTION       Allergies:  Dextromethorphan-guaifenesin  Current Outpatient Medications   Medication Sig Dispense Refill    loratadine (CLARITIN) 10 MG tablet Take 1 tablet by mouth daily 30 tablet 0    baclofen (LIORESAL) 10 MG tablet Take 5 mg by mouth 3 times daily Patient only taking once a day at bedtime.  norethindrone-ethinyl estradiol-Fe (LO LOESTRIN FE) 1 MG-10 MCG / 10 MCG tablet Take 1 tablet by mouth daily 84 tablet 3    Biotin 1 MG CAPS Take by mouth      fluticasone (FLONASE) 50 MCG/ACT nasal spray 1 spray by Each Nostril route daily 2 Bottle 1    Probiotic Product (PROBIOTIC-10 PO) Take by mouth      Multiple Vitamins-Minerals (ONE-A-DAY WOMENS VITACRAVES) CHEW Take by mouth      Lansoprazole (PREVACID PO) Take 1 tablet by mouth daily. No current facility-administered medications for this visit.      Social History     Socioeconomic History    Marital status: Single     Spouse name: Not on file    Number of children: Not on file    Years of education: Not on file    Highest education level: Not on file   Occupational History    Not on file   Tobacco Use    Smoking status: Former Smoker     Packs/day: 0.25     Years: 9.00     Pack years: 2.25     Types: Cigarettes     Quit date: 2017     Years since quittin.3    Smokeless tobacco: Never Used    Tobacco comment: 4 cigs daily  2/4/15   Substance and Sexual Activity    Alcohol use: Yes     Comment: occassional    Drug use: No    Sexual activity: Not Currently     Partners: Male     Birth control/protection: Condom   Other Topics Concern    Not on file   Social History Narrative    Not on file     Social Determinants of Health     Financial Resource Strain:     Difficulty of Paying Living Expenses: Not on file   Food Insecurity:     Worried About Running Out of Food in the Last Year: Not on file    Toy of Food in the Last Year: Not on file   Transportation Needs:     Lack of Transportation (Medical): Not on file    Lack of Transportation (Non-Medical):  Not on file   Physical Activity:     Days of Exercise per Week: Not on file    Minutes of Exercise per Session: Not on file   Stress:     Feeling of Stress : Not on file   Social Connections:     Frequency of Communication with Friends and Family: Not on file    Frequency of Social Gatherings with Friends and Family: Not on file    Attends Mosque Services: Not on file    Active Member of [address] [place] [address] or Organizations: Not on file    Attends Club or Organization Meetings: Not on file    Marital Status: Not on file   Intimate Partner Violence:     Fear of Current or Ex-Partner: Not on file    Emotionally Abused: Not on file    Physically Abused: Not on file    Sexually Abused: Not on file   Housing Stability:     Unable to Pay for Housing in the Last Year: Not on file    Number of [place] in the Last Year: Not on file    Unstable Housing in the Last Year: Not on file     Family History   Problem Relation Age of Onset    High Blood Pressure Mother     No Known Problems Father     Cancer Paternal Aunt         rigoberto lymp    Heart Disease Maternal Grandfather         bi pass    Stroke Maternal Grandfather         8/2012 mini stroke    Diabetes Maternal Grandfather     Cancer Paternal Grandmother         lymphomia    Diabetes Paternal Grandfather        Review of Systems   Constitutional: Negative. Negative for activity change, appetite change, chills, diaphoresis, fatigue, fever and unexpected weight change. HENT: Negative. Eyes: Negative. Respiratory: Negative. Cardiovascular: Negative. Gastrointestinal: Negative for abdominal distention, abdominal pain, anal bleeding, blood in stool, constipation, diarrhea, nausea, rectal pain and vomiting. Endocrine: Negative. Genitourinary: Negative for decreased urine volume, difficulty urinating, dyspareunia, dysuria, enuresis, flank pain, frequency, genital sores, hematuria, menstrual problem, pelvic pain, urgency, vaginal bleeding, vaginal discharge and vaginal pain. Musculoskeletal: Negative. Skin: Negative. Allergic/Immunologic: Negative. Neurological: Negative. Hematological: Negative. Psychiatric/Behavioral: Negative. Objective:     Physical Exam  Constitutional:       Appearance: She is well-developed. HENT:      Head: Normocephalic. Eyes:      General: Lids are normal. Vision grossly intact. Neck:      Thyroid: No thyromegaly. Cardiovascular:      Rate and Rhythm: Normal rate and regular rhythm. Heart sounds: Normal heart sounds. Pulmonary:      Effort: Pulmonary effort is normal. No respiratory distress. Breath sounds: Normal breath sounds. No wheezing or rales. Chest:      Chest wall: No tenderness. Breasts:      Right: Normal. No swelling, bleeding, inverted nipple, mass, nipple discharge, skin change, tenderness, axillary adenopathy or supraclavicular adenopathy. Left: Normal. No swelling, bleeding, inverted nipple, mass, nipple discharge, skin change, tenderness, axillary adenopathy or supraclavicular adenopathy. Abdominal:      General: There is no distension. Palpations: Abdomen is soft. There is no mass. Tenderness: There is no abdominal tenderness. There is no guarding or rebound.       Hernia: No Collection Type     Answer: Thin Prep     Order Specific Question:   Prior Abnormal Pap Test     Answer:   No     Order Specific Question:   Screening or Diagnostic     Answer:   Screening     Order Specific Question:   HPV Requested? Answer:   Yes     Order Specific Question:   High Risk Patient     Answer:   N/A         Follow up:  Return in about 1 year (around 12/10/2022) for Annual.   Repeat Annual GYN exam every 1 year. Cervical Cytology exam starts age 24. If Negative Cytology;  follow-up screening per current guidelines. Mammograms yearly starting at age 36. Calcium and Vitamin D dosing reviewed ( age appropriate ). Colonoscopy and bone density screening discussed ( age appropriate ). Birth control and STD prevention discussed ( age appropriate ). Gardisil counseling completed for all patients ( age appropriate ). Routine health maintenance ( per PCP and guidelines ).

## 2021-12-22 DIAGNOSIS — Z11.3 ROUTINE SCREENING FOR STI (SEXUALLY TRANSMITTED INFECTION): ICD-10-CM

## 2021-12-22 DIAGNOSIS — Z01.419 WOMEN'S ANNUAL ROUTINE GYNECOLOGICAL EXAMINATION: ICD-10-CM

## 2021-12-22 DIAGNOSIS — Z11.51 SCREENING FOR HUMAN PAPILLOMAVIRUS: ICD-10-CM

## 2021-12-23 ENCOUNTER — TELEPHONE (OUTPATIENT)
Dept: OBGYN CLINIC | Age: 36
End: 2021-12-23

## 2021-12-23 DIAGNOSIS — B37.31 CANDIDAL VAGINITIS: Primary | ICD-10-CM

## 2021-12-23 RX ORDER — FLUCONAZOLE 150 MG/1
TABLET ORAL
Qty: 3 TABLET | Refills: 1 | Status: SHIPPED | OUTPATIENT
Start: 2021-12-23 | End: 2022-05-13

## 2021-12-23 NOTE — TELEPHONE ENCOUNTER
----- Message from Alexandra Acevedo Lexie Tara sent at 2021 10:45 AM EST -----  Wet Prep:  Yeast  Rx:  Diflucan 150m tablet every 3 days for 3 doses  Pharmacy:    [address] [place] [address] MyChart:  Message Sent    Please make sure patient is aware. Thanks!

## 2021-12-23 NOTE — RESULT ENCOUNTER NOTE
Wet Prep:  Yeast  Rx:  Diflucan 150m tablet every 3 days for 3 doses  Pharmacy:    [address] [place] [address] MyChart:  Message Sent    Please make sure patient is aware. Thanks!

## 2022-04-30 DIAGNOSIS — J01.00 ACUTE NON-RECURRENT MAXILLARY SINUSITIS: ICD-10-CM

## 2022-05-02 NOTE — TELEPHONE ENCOUNTER
Requesting medication refill. Please approve or deny this request.    Rx requested:  Requested Prescriptions     Pending Prescriptions Disp Refills    fluticasone (FLONASE) 50 MCG/ACT nasal spray       Si spray by Each Nostril route daily       Last Office Visit:   11/10/2020    Next Visit Date:  Future Appointments   Date Time Provider Brendan Gil   2022  9:00 AM Ariana Kirkland MD [address] [place] [address]     Message sent to schedule appt.

## 2022-05-03 RX ORDER — FLUTICASONE PROPIONATE 50 MCG
1 SPRAY, SUSPENSION (ML) NASAL DAILY
Qty: 1 EACH | Refills: 0 | OUTPATIENT
Start: 2022-05-03

## 2022-05-13 ENCOUNTER — OFFICE VISIT (OUTPATIENT)
Dept: FAMILY MEDICINE CLINIC | Age: 37
End: 2022-05-13
Payer: COMMERCIAL

## 2022-05-13 VITALS
WEIGHT: 167.8 LBS | BODY MASS INDEX: 28.65 KG/M2 | DIASTOLIC BLOOD PRESSURE: 82 MMHG | SYSTOLIC BLOOD PRESSURE: 130 MMHG | TEMPERATURE: 98.7 F | HEIGHT: 64 IN | HEART RATE: 74 BPM | OXYGEN SATURATION: 97 %

## 2022-05-13 DIAGNOSIS — R53.83 FATIGUE, UNSPECIFIED TYPE: Primary | ICD-10-CM

## 2022-05-13 DIAGNOSIS — Z11.59 ENCOUNTER FOR HEPATITIS C SCREENING TEST FOR LOW RISK PATIENT: ICD-10-CM

## 2022-05-13 DIAGNOSIS — R53.83 FATIGUE, UNSPECIFIED TYPE: ICD-10-CM

## 2022-05-13 DIAGNOSIS — J01.00 ACUTE NON-RECURRENT MAXILLARY SINUSITIS: ICD-10-CM

## 2022-05-13 LAB
ALBUMIN SERPL-MCNC: 4.5 G/DL (ref 3.5–4.6)
ALP BLD-CCNC: 45 U/L (ref 40–130)
ALT SERPL-CCNC: 31 U/L (ref 0–33)
ANION GAP SERPL CALCULATED.3IONS-SCNC: 14 MEQ/L (ref 9–15)
AST SERPL-CCNC: 28 U/L (ref 0–35)
BILIRUB SERPL-MCNC: 0.3 MG/DL (ref 0.2–0.7)
BUN BLDV-MCNC: 20 MG/DL (ref 6–20)
CALCIUM SERPL-MCNC: 9.1 MG/DL (ref 8.5–9.9)
CHLORIDE BLD-SCNC: 103 MEQ/L (ref 95–107)
CO2: 21 MEQ/L (ref 20–31)
CREAT SERPL-MCNC: 0.93 MG/DL (ref 0.5–0.9)
GFR AFRICAN AMERICAN: >60
GFR NON-AFRICAN AMERICAN: >60
GLOBULIN: 2.4 G/DL (ref 2.3–3.5)
GLUCOSE BLD-MCNC: 86 MG/DL (ref 70–99)
HCT VFR BLD CALC: 39.3 % (ref 37–47)
HEMOGLOBIN: 13 G/DL (ref 12–16)
MCH RBC QN AUTO: 29.5 PG (ref 27–31.3)
MCHC RBC AUTO-ENTMCNC: 33.1 % (ref 33–37)
MCV RBC AUTO: 89.1 FL (ref 82–100)
PDW BLD-RTO: 12.5 % (ref 11.5–14.5)
PLATELET # BLD: 170 K/UL (ref 130–400)
POTASSIUM SERPL-SCNC: 4.4 MEQ/L (ref 3.4–4.9)
RBC # BLD: 4.42 M/UL (ref 4.2–5.4)
SODIUM BLD-SCNC: 138 MEQ/L (ref 135–144)
T4 FREE: 1.07 NG/DL (ref 0.84–1.68)
TOTAL PROTEIN: 6.9 G/DL (ref 6.3–8)
TSH SERPL DL<=0.05 MIU/L-ACNC: 1.73 UIU/ML (ref 0.44–3.86)
WBC # BLD: 5.1 K/UL (ref 4.8–10.8)

## 2022-05-13 PROCEDURE — 1036F TOBACCO NON-USER: CPT | Performed by: FAMILY MEDICINE

## 2022-05-13 PROCEDURE — 99213 OFFICE O/P EST LOW 20 MIN: CPT | Performed by: FAMILY MEDICINE

## 2022-05-13 PROCEDURE — G8427 DOCREV CUR MEDS BY ELIG CLIN: HCPCS | Performed by: FAMILY MEDICINE

## 2022-05-13 PROCEDURE — G8419 CALC BMI OUT NRM PARAM NOF/U: HCPCS | Performed by: FAMILY MEDICINE

## 2022-05-13 RX ORDER — FLUTICASONE PROPIONATE 50 MCG
1 SPRAY, SUSPENSION (ML) NASAL DAILY
Status: CANCELLED | OUTPATIENT
Start: 2022-05-13

## 2022-05-13 RX ORDER — FLUTICASONE PROPIONATE 50 MCG
1 SPRAY, SUSPENSION (ML) NASAL DAILY
Qty: 32 G | Refills: 5 | Status: SHIPPED | OUTPATIENT
Start: 2022-05-13

## 2022-05-13 SDOH — ECONOMIC STABILITY: FOOD INSECURITY: WITHIN THE PAST 12 MONTHS, YOU WORRIED THAT YOUR FOOD WOULD RUN OUT BEFORE YOU GOT MONEY TO BUY MORE.: NEVER TRUE

## 2022-05-13 SDOH — ECONOMIC STABILITY: FOOD INSECURITY: WITHIN THE PAST 12 MONTHS, THE FOOD YOU BOUGHT JUST DIDN'T LAST AND YOU DIDN'T HAVE MONEY TO GET MORE.: NEVER TRUE

## 2022-05-13 ASSESSMENT — SOCIAL DETERMINANTS OF HEALTH (SDOH): HOW HARD IS IT FOR YOU TO PAY FOR THE VERY BASICS LIKE FOOD, HOUSING, MEDICAL CARE, AND HEATING?: NOT HARD AT ALL

## 2022-05-13 ASSESSMENT — PATIENT HEALTH QUESTIONNAIRE - PHQ9
1. LITTLE INTEREST OR PLEASURE IN DOING THINGS: 0
SUM OF ALL RESPONSES TO PHQ9 QUESTIONS 1 & 2: 0
2. FEELING DOWN, DEPRESSED OR HOPELESS: 0
SUM OF ALL RESPONSES TO PHQ QUESTIONS 1-9: 0

## 2022-05-13 NOTE — PROGRESS NOTES
Chief Complaint   Patient presents with    Annual Exam     flonase script       HPI:  Vee Fall is a 31 y.o. female     Allergy issues  Also some weight gain   Lack of energy     Working out, watching diet  Can't lose weight     Patient Active Problem List   Diagnosis    Lipoma of chest wall    Occipital neuralgia of left side       Current Outpatient Medications   Medication Sig Dispense Refill    fluticasone (FLONASE) 50 MCG/ACT nasal spray 1 spray by Each Nostril route daily 32 g 5    cetirizine (ZYRTEC) 10 MG tablet Take 10 mg by mouth daily      norethindrone-ethinyl estradiol-Fe (LO LOESTRIN FE) 1 MG-10 MCG / 10 MCG tablet Take 1 tablet by mouth daily 84 tablet 3    Probiotic Product (PROBIOTIC-10 PO) Take by mouth      Multiple Vitamins-Minerals (ONE-A-DAY WOMENS VITACRAVES) CHEW Take by mouth      Lansoprazole (PREVACID PO) Take 1 tablet by mouth daily.  fluconazole (DIFLUCAN) 150 MG tablet Take 1 tablet every 3 days for 3 doses. For example: Take on Monday, Thursday, Sunday. 3 tablet 1     No current facility-administered medications for this visit. Patient's medications, allergies, past medical, surgical, social and family histories were reviewed and updated as appropriate. Review of Systems:   General ROS: fatigue, weight gain  Respiratory ROS: no cough, shortness of breath, or wheezing  Cardiovascular ROS: no chest pain or dyspnea on exertion  Gastrointestinal ROS: no abdominal pain, change in bowel habits, or black or bloody stools  Genito-Urinary ROS: no dysuria, trouble voiding  Musculoskeletal ROS: negative for - gait disturbance, joint pain or joint stiffness  Neurological ROS: negative for - behavioral changes, memory loss, numbness/tingling, tremors or weakness    In general patient otherwise reports feeling well.      Physical Exam:  /82 (Site: Left Upper Arm)   Pulse 74   Temp 98.7 °F (37.1 °C)   Ht 5' 4\" (1.626 m)   Wt 167 lb 12.8 oz (76.1 kg)   SpO2 97% Breastfeeding No   BMI 28.80 kg/m²     Gen: Well, NAD, Alert, Oriented x 3   HEENT: EOMI, eyes clear, MMM  Skin: without rash or jaundice  Neck: no significant lymphadenopathy or thyromegaly  Lungs: CTA B w/out Rales/Wheezes/Rhonchi, Good respiratory effort   Heart: RRR, S1S2, w/out M/R/G, non-displaced PMI   Ext: No C/C/E Bilaterally. Neuro: Neurovascularly intact w/ Sensory/Motor intact UE/LE Bilaterally. Lab Results   Component Value Date    WBC 6.0 09/25/2020    HGB 13.4 09/25/2020    HCT 41.1 09/25/2020     09/25/2020    CHOL 134 02/07/2019    TRIG 119 02/07/2019    HDL 55 02/07/2019    ALT 17 09/25/2020    AST 20 09/25/2020     09/25/2020    K 4.6 09/25/2020     09/25/2020    CREATININE 0.77 09/25/2020    BUN 18 09/25/2020    CO2 24 09/25/2020    LABA1C 5.5 02/07/2019         A&P   Diagnosis Orders   1. Fatigue, unspecified type  TSH    Comprehensive Metabolic Panel    CBC    T4, Free   2. Acute non-recurrent maxillary sinusitis  fluticasone (FLONASE) 50 MCG/ACT nasal spray   3.  Encounter for hepatitis C screening test for low risk patient  Hepatitis C Antibody     Continue healthy diet and exercise   flonase refill  Labs as ordered     Consider adipex if BMI>30  Explained cannot do if under 30 with no comorbid condition    Woodard Krabbe, MD

## 2022-05-14 LAB — HEPATITIS C ANTIBODY: NONREACTIVE

## 2022-06-06 ENCOUNTER — OFFICE VISIT (OUTPATIENT)
Dept: FAMILY MEDICINE CLINIC | Age: 37
End: 2022-06-06
Payer: COMMERCIAL

## 2022-06-06 VITALS
TEMPERATURE: 96.7 F | OXYGEN SATURATION: 98 % | WEIGHT: 170.6 LBS | HEIGHT: 64 IN | DIASTOLIC BLOOD PRESSURE: 76 MMHG | BODY MASS INDEX: 29.12 KG/M2 | SYSTOLIC BLOOD PRESSURE: 118 MMHG | HEART RATE: 90 BPM

## 2022-06-06 DIAGNOSIS — R53.83 FATIGUE, UNSPECIFIED TYPE: Primary | ICD-10-CM

## 2022-06-06 DIAGNOSIS — E66.3 OVERWEIGHT (BMI 25.0-29.9): ICD-10-CM

## 2022-06-06 PROCEDURE — G8427 DOCREV CUR MEDS BY ELIG CLIN: HCPCS | Performed by: STUDENT IN AN ORGANIZED HEALTH CARE EDUCATION/TRAINING PROGRAM

## 2022-06-06 PROCEDURE — 99213 OFFICE O/P EST LOW 20 MIN: CPT | Performed by: STUDENT IN AN ORGANIZED HEALTH CARE EDUCATION/TRAINING PROGRAM

## 2022-06-06 PROCEDURE — 1036F TOBACCO NON-USER: CPT | Performed by: STUDENT IN AN ORGANIZED HEALTH CARE EDUCATION/TRAINING PROGRAM

## 2022-06-06 PROCEDURE — G8419 CALC BMI OUT NRM PARAM NOF/U: HCPCS | Performed by: STUDENT IN AN ORGANIZED HEALTH CARE EDUCATION/TRAINING PROGRAM

## 2022-06-06 RX ORDER — BUPROPION HYDROCHLORIDE 150 MG/1
150 TABLET ORAL EVERY MORNING
Qty: 30 TABLET | Refills: 0 | Status: SHIPPED | OUTPATIENT
Start: 2022-06-06 | End: 2022-06-27 | Stop reason: SDUPTHER

## 2022-06-06 ASSESSMENT — ENCOUNTER SYMPTOMS
VOMITING: 0
ABDOMINAL PAIN: 0
SHORTNESS OF BREATH: 0
SINUS PRESSURE: 0
COUGH: 0
SORE THROAT: 0

## 2022-06-06 NOTE — PROGRESS NOTES
[id number] [place] [address] (:  [phone]) is a 31 y.o. female, here for evaluation of the following medical concerns:  Chief Complaint   Patient presents with    Weight Loss    Fatigue     HPI  Weight loss  Working out 4 times a week in the morning  Calorie restricted 1100 perico daily  She has not tried any medications to aid in weight loss  She is becoming frustrated because she seems to be gaining weight  No history of diabetes, hypertension or high cholesterol    Review of Systems   Constitutional: Positive for unexpected weight change. Negative for chills and fever. HENT: Negative for congestion, sinus pressure and sore throat. Respiratory: Negative for cough and shortness of breath. Cardiovascular: Negative for chest pain and palpitations. Gastrointestinal: Negative for abdominal pain and vomiting. Musculoskeletal: Negative for arthralgias and myalgias. Skin: Negative for rash and wound. Neurological: Negative for speech difficulty and light-headedness. Psychiatric/Behavioral: Negative for suicidal ideas. The patient is not nervous/anxious. Prior to Visit Medications    Medication Sig Taking? Authorizing Provider   buPROPion (WELLBUTRIN XL) 150 MG extended release tablet Take 1 tablet by mouth every morning Yes Mi Bean DO   fluticasone (FLONASE) 50 MCG/ACT nasal spray 1 spray by Each Nostril route daily Yes Semaj Santoro MD   cetirizine (ZYRTEC) 10 MG tablet Take 10 mg by mouth daily Yes Historical Provider, MD   norethindrone-ethinyl estradiol-Fe (LO LOESTRIN FE) 1 MG-10 MCG / 10 MCG tablet Take 1 tablet by mouth daily Yes Kt Foley MD   Probiotic Product (PROBIOTIC-10 PO) Take by mouth Yes Historical Provider, MD   Multiple Vitamins-Minerals (ONE-A-DAY WOMENS VITACRAVES) CHEW Take by mouth Yes Historical Provider, MD   Lansoprazole (PREVACID PO) Take 1 tablet by mouth daily. Yes Historical Provider, MD        There are no discontinued medications.     Allergies Allergen Reactions    Dextromethorphan-Guaifenesin Other (See Comments)     camacho Roland       Past Medical History:   Diagnosis Date    GERD (gastroesophageal reflux disease)     Neuralgia     left optic neuralgia       Past Surgical History:   Procedure Laterality Date    EXCISION / BIOPSY SKIN LESION OF TRUNK N/A 2019    EXCISION OF CHEST WALL MASS X2 performed by Ethan Crain MD at [address] [place] [address]  2012    mole removal on back left side     WISDOM TOOTH EXTRACTION         Social History     Socioeconomic History    Marital status: Single     Spouse name: Not on file    Number of children: Not on file    Years of education: Not on file    Highest education level: Not on file   Occupational History    Not on file   Tobacco Use    Smoking status: Former Smoker     Packs/day: 0.25     Years: 9.00     Pack years: 2.25     Types: Cigarettes     Quit date: 2017     Years since quittin.8    Smokeless tobacco: Never Used    Tobacco comment: 4 cigs daily  2/4/15   Vaping Use    Vaping Use: Never used   Substance and Sexual Activity    Alcohol use: Yes     Comment: occassional    Drug use: No    Sexual activity: Yes     Partners: Male     Birth control/protection: Condom, OCP   Other Topics Concern    Not on file   Social History Narrative    Not on file     Social Determinants of Health     Financial Resource Strain: Low Risk     Difficulty of Paying Living Expenses: Not hard at all   Food Insecurity: No Food Insecurity    Worried About [address] in the Last Year: Never true    [facility] in the Last Year: Never true   Transportation Needs: No Transportation Needs    Lack of Transportation (Medical): No    Lack of Transportation (Non-Medical):  No   Physical Activity:     Days of Exercise per Week: Not on file    Minutes of Exercise per Session: Not on file   Stress:     Feeling of Stress : Not on file   Social Connections:     Frequency Movements: Extraocular movements intact. Conjunctiva/sclera: Conjunctivae normal.   Musculoskeletal:         General: No deformity. Normal range of motion. Skin:     Findings: No lesion or rash. Neurological:      General: No focal deficit present. Mental Status: She is alert. Mental status is at baseline. Psychiatric:         Mood and Affect: Mood normal.         Behavior: Behavior normal.         Thought Content: Thought content normal.         ASSESSMENT/PLAN:  1. Fatigue, unspecified type  Recent labs including fasting sugar and thyroid level normal  Patient endorsing daily fatigue  She is getting frustrated as she seems to be gaining weight despite calorie restriction and increased physical activity  Discussed that her BMI is under 30 and she has no other chronic overweight/obesity chronic conditions  Discussed medication options including Wellbutrin, metformin, Sandip, Topamax  Patient would like to try Wellbutrin at this time  She may notice improvement in her fatigue as well  Close follow-up in 2 weeks    - buPROPion (WELLBUTRIN XL) 150 MG extended release tablet; Take 1 tablet by mouth every morning  Dispense: 30 tablet; Refill: 0    2. Overweight (BMI 25.0-29.9)  - buPROPion (WELLBUTRIN XL) 150 MG extended release tablet; Take 1 tablet by mouth every morning  Dispense: 30 tablet; Refill: 0      There are no discontinued medications.    ---------------------------------------------------------------------  Side effects, adverse effects of the medication prescribed today, as well as treatment plan/ rationale and result expectations have been discussed with the patient who expresses understanding and desires to proceed. Close follow up to evaluate treatment results and for coordination of care. I have reviewed the patient's medical history in detail and updated the computerized patient record.      As always, patient is advised that if symptoms worsen in any way they will proceed to the nearest emergency room. --------------------------------------------------------------------    Return in about 2 weeks (around 6/20/2022) for Weight management 30 min. An  electronic signature was used to authenticate this note.     --Bay Ross DO on 6/6/2022 at 2:20 PM

## 2022-06-27 ENCOUNTER — OFFICE VISIT (OUTPATIENT)
Dept: FAMILY MEDICINE CLINIC | Age: 37
End: 2022-06-27
Payer: COMMERCIAL

## 2022-06-27 VITALS
SYSTOLIC BLOOD PRESSURE: 122 MMHG | DIASTOLIC BLOOD PRESSURE: 76 MMHG | HEIGHT: 64 IN | HEART RATE: 67 BPM | WEIGHT: 168.4 LBS | OXYGEN SATURATION: 98 % | BODY MASS INDEX: 28.75 KG/M2 | TEMPERATURE: 96.7 F

## 2022-06-27 DIAGNOSIS — E66.3 OVERWEIGHT (BMI 25.0-29.9): ICD-10-CM

## 2022-06-27 DIAGNOSIS — R53.83 FATIGUE, UNSPECIFIED TYPE: ICD-10-CM

## 2022-06-27 PROCEDURE — 1036F TOBACCO NON-USER: CPT | Performed by: STUDENT IN AN ORGANIZED HEALTH CARE EDUCATION/TRAINING PROGRAM

## 2022-06-27 PROCEDURE — G8419 CALC BMI OUT NRM PARAM NOF/U: HCPCS | Performed by: STUDENT IN AN ORGANIZED HEALTH CARE EDUCATION/TRAINING PROGRAM

## 2022-06-27 PROCEDURE — 99213 OFFICE O/P EST LOW 20 MIN: CPT | Performed by: STUDENT IN AN ORGANIZED HEALTH CARE EDUCATION/TRAINING PROGRAM

## 2022-06-27 PROCEDURE — G8427 DOCREV CUR MEDS BY ELIG CLIN: HCPCS | Performed by: STUDENT IN AN ORGANIZED HEALTH CARE EDUCATION/TRAINING PROGRAM

## 2022-06-27 RX ORDER — BUPROPION HYDROCHLORIDE 300 MG/1
300 TABLET ORAL EVERY MORNING
Qty: 30 TABLET | Refills: 0 | Status: SHIPPED | OUTPATIENT
Start: 2022-06-27 | End: 2022-08-05 | Stop reason: SDUPTHER

## 2022-06-27 ASSESSMENT — ENCOUNTER SYMPTOMS
ABDOMINAL PAIN: 0
VOMITING: 0
COUGH: 0
SHORTNESS OF BREATH: 0
SORE THROAT: 0
SINUS PRESSURE: 0

## 2022-06-27 NOTE — PROGRESS NOTES
Shayy Pastrana (:  701) is a 31 y.o. female, here for evaluation of the following medical concerns:  Chief Complaint   Patient presents with    Weight Management     HPI  Weight loss  Working out 4 times a week in the morning  Calorie restricted 1,100 perico daily  She has not tried any medications to aid in weight loss  She is becoming frustrated because she seems to be gaining weight  No history of diabetes, hypertension or high cholesterol    Started on Wellbutrin ER 150mg     Has noticed slight improvement in appetite and mood  No side effects    Review of Systems   Constitutional: Positive for unexpected weight change. Negative for chills and fever. HENT: Negative for congestion, sinus pressure and sore throat. Respiratory: Negative for cough and shortness of breath. Cardiovascular: Negative for chest pain and palpitations. Gastrointestinal: Negative for abdominal pain and vomiting. Musculoskeletal: Negative for arthralgias and myalgias. Skin: Negative for rash and wound. Neurological: Negative for speech difficulty and light-headedness. Psychiatric/Behavioral: Negative for suicidal ideas. The patient is not nervous/anxious. Prior to Visit Medications    Medication Sig Taking?  Authorizing Provider   buPROPion (WELLBUTRIN XL) 300 MG extended release tablet Take 1 tablet by mouth every morning Yes Paradise Garduno DO   fluticasone (FLONASE) 50 MCG/ACT nasal spray 1 spray by Each Nostril route daily Yes Avel Flores MD   cetirizine (ZYRTEC) 10 MG tablet Take 10 mg by mouth daily Yes Historical Provider, MD   norethindrone-ethinyl estradiol-Fe (LO LOESTRIN FE) 1 MG-10 MCG / 10 MCG tablet Take 1 tablet by mouth daily Yes Nadja Proctor MD   Probiotic Product (PROBIOTIC-10 PO) Take by mouth Yes Historical Provider, MD   Multiple Vitamins-Minerals (ONE-A-DAY WOMENS VITACRAVES) CHEW Take by mouth Yes Historical Provider, MD   Lansoprazole (PREVACID PO) Take 1 tablet by Physical Activity:     Days of Exercise per Week: Not on file    Minutes of Exercise per Session: Not on file   Stress:     Feeling of Stress : Not on file   Social Connections:     Frequency of Communication with Friends and Family: Not on file    Frequency of Social Gatherings with Friends and Family: Not on file    Attends Sabianism Services: Not on file    Active Member of [address] [place] [address] [employer] or Organizations: Not on file    Attends Club or Organization Meetings: Not on file    Marital Status: Not on file   Intimate Partner Violence:     Fear of Current or Ex-Partner: Not on file    Emotionally Abused: Not on file    Physically Abused: Not on file    Sexually Abused: Not on file   Housing Stability:     Unable to Pay for Housing in the Last Year: Not on file    Number of [place] in the Last Year: Not on file    Unstable Housing in the Last Year: Not on file        Family History   Problem Relation Age of Onset    High Blood Pressure Mother     No Known Problems Father     Cancer Paternal Aunt         rigoberto lymp    Heart Disease Maternal Grandfather         bi pass    Stroke Maternal Grandfather         8/2012 mini stroke    Diabetes Maternal Grandfather     Cancer Paternal Grandmother         lymphomia    Diabetes Paternal Grandfather        Vitals:    06/27/22 0810   BP: 122/76   Pulse: 67   Temp: (!) 96.7 °F (35.9 °C)   SpO2: 98%   Weight: 168 lb 6.4 oz (76.4 kg)   Height: 5' 4\" (1.626 m)       Estimated body mass index is 28.91 kg/m² as calculated from the following:    Height as of this encounter: 5' 4\" (1.626 m). Weight as of this encounter: 168 lb 6.4 oz (76.4 kg). No results for input(s): WBC, RBC, HGB, HCT, MCV, MCH, MCHC, RDW, PLT, MPV in the last 72 hours. No results for input(s): NA, K, CL, CO2, BUN, CREATININE, GLUCOSE, CALCIUM, PROT, LABALBU, BILITOT, ALKPHOS, AST, ALT in the last 72 hours.     Lab Results   Component Value Date    LABA1C 5.5 02/07/2019       No results found.     Physical Exam  Constitutional:       General: She is not in acute distress. Appearance: Normal appearance. HENT:      Head: Normocephalic and atraumatic. Eyes:      Extraocular Movements: Extraocular movements intact. Conjunctiva/sclera: Conjunctivae normal.   Musculoskeletal:         General: No deformity. Normal range of motion. Skin:     Findings: No lesion or rash. Neurological:      General: No focal deficit present. Mental Status: She is alert. Mental status is at baseline. Psychiatric:         Mood and Affect: Mood normal.         Behavior: Behavior normal.         Thought Content: Thought content normal.         ASSESSMENT/PLAN:  1. Fatigue, unspecified type  Recent labs including fasting sugar and thyroid level normal  Patient endorsing daily fatigue  She is getting frustrated as she seems to be gaining weight despite calorie restriction and increased physical activity  Discussed that her BMI is under 30 and she has no other chronic overweight/obesity chronic conditions  Discussed medication options including Wellbutrin, metformin, Sandip, Topamax  Patient opted to try Wellbutrin  Doing well without any side effects, slight improvement in mood and appetite  Will increase to from 150 mg to 300 mg  Follow-up in 4 weeks    - buPROPion (WELLBUTRIN XL) 300 MG extended release tablet; Take 1 tablet by mouth every morning  Dispense: 30 tablet; Refill: 0    2. Overweight (BMI 25.0-29.9)  - buPROPion (WELLBUTRIN XL) 300 MG extended release tablet; Take 1 tablet by mouth every morning  Dispense: 30 tablet;  Refill: 0      Medications Discontinued During This Encounter   Medication Reason    buPROPion (WELLBUTRIN XL) 150 MG extended release tablet REORDER       ---------------------------------------------------------------------  Side effects, adverse effects of the medication prescribed today, as well as treatment plan/ rationale and result expectations have been discussed with the patient who expresses understanding and desires to proceed. Close follow up to evaluate treatment results and for coordination of care. I have reviewed the patient's medical history in detail and updated the computerized patient record. As always, patient is advised that if symptoms worsen in any way they will proceed to the nearest emergency room. --------------------------------------------------------------------    Return in about 4 weeks (around 7/25/2022) for weight management 15 min. An  electronic signature was used to authenticate this note.     --Jeane Diaz DO on 6/27/2022 at 8:25 AM

## 2022-07-29 ENCOUNTER — OFFICE VISIT (OUTPATIENT)
Dept: FAMILY MEDICINE CLINIC | Age: 37
End: 2022-07-29
Payer: COMMERCIAL

## 2022-07-29 VITALS
OXYGEN SATURATION: 98 % | BODY MASS INDEX: 27.9 KG/M2 | SYSTOLIC BLOOD PRESSURE: 104 MMHG | DIASTOLIC BLOOD PRESSURE: 72 MMHG | HEART RATE: 77 BPM | HEIGHT: 64 IN | WEIGHT: 163.4 LBS | TEMPERATURE: 96.8 F

## 2022-07-29 DIAGNOSIS — R53.83 FATIGUE, UNSPECIFIED TYPE: Primary | ICD-10-CM

## 2022-07-29 DIAGNOSIS — E66.3 OVERWEIGHT (BMI 25.0-29.9): ICD-10-CM

## 2022-07-29 PROCEDURE — 99213 OFFICE O/P EST LOW 20 MIN: CPT | Performed by: STUDENT IN AN ORGANIZED HEALTH CARE EDUCATION/TRAINING PROGRAM

## 2022-07-29 ASSESSMENT — ENCOUNTER SYMPTOMS
ABDOMINAL PAIN: 0
SORE THROAT: 0
SINUS PRESSURE: 0
SHORTNESS OF BREATH: 0
VOMITING: 0
COUGH: 0

## 2022-07-29 NOTE — PROGRESS NOTES
[id number] [place] [address] Petar (:  [phone]) is a 30 y.o. female, here for evaluation of the following medical concerns:  No chief complaint on file. HPI  Weight loss  Working out 4 times a week in the morning  Calorie restricted 1,100 perico daily  Does eat out about once a week  No history of diabetes, hypertension or high cholesterol    Taking Wellbutrin XL 300mg daily  Tolerating well without side effects  Has lost 5 pounds in the last 4 weeks    Has noticed slight improvement in appetite suppression   Notices big improvement in her fatigue    Review of Systems   Constitutional:  Positive for unexpected weight change. Negative for chills and fever. HENT:  Negative for congestion, sinus pressure and sore throat. Respiratory:  Negative for cough and shortness of breath. Cardiovascular:  Negative for chest pain and palpitations. Gastrointestinal:  Negative for abdominal pain and vomiting. Musculoskeletal:  Negative for arthralgias and myalgias. Skin:  Negative for rash and wound. Neurological:  Negative for speech difficulty and light-headedness. Psychiatric/Behavioral:  Negative for suicidal ideas. The patient is not nervous/anxious. Prior to Visit Medications    Medication Sig Taking? Authorizing Provider   buPROPion (WELLBUTRIN XL) 300 MG extended release tablet Take 1 tablet by mouth every morning Yes Jennifer Macdonald DO   fluticasone (FLONASE) 50 MCG/ACT nasal spray 1 spray by Each Nostril route daily Yes Socorro Brooks MD   cetirizine (ZYRTEC) 10 MG tablet Take 10 mg by mouth daily Yes Historical Provider, MD   norethindrone-ethinyl estradiol-Fe (LO LOESTRIN FE) 1 MG-10 MCG / 10 MCG tablet Take 1 tablet by mouth daily Yes Sera Ball MD   Probiotic Product (PROBIOTIC-10 PO) Take by mouth Yes Historical Provider, MD   Multiple Vitamins-Minerals (ONE-A-DAY WOMENS VITACRAVES) CHEW Take by mouth Yes Historical Provider, MD   Lansoprazole (PREVACID PO) Take 1 tablet by mouth daily.  Yes Historical Provider, MD        There are no discontinued medications. Allergies   Allergen Reactions    Dextromethorphan-Guaifenesin Other (See Comments)     Weak, camacho       Past Medical History:   Diagnosis Date    GERD (gastroesophageal reflux disease)     Neuralgia     left optic neuralgia       Past Surgical History:   Procedure Laterality Date    EXCISION / BIOPSY SKIN LESION OF TRUNK N/A 2019    EXCISION OF CHEST WALL MASS X2 performed by Anayeli Machuca MD at [facility] [facility]  2012    mole removal on back left side     WISDOM TOOTH EXTRACTION         Social History     Socioeconomic History    Marital status: Single     Spouse name: Not on file    Number of children: Not on file    Years of education: Not on file    Highest education level: Not on file   Occupational History    Not on file   Tobacco Use    Smoking status: Former     Packs/day: 0.25     Years: 9.00     Pack years: 2.25     Types: Cigarettes     Quit date: 2017     Years since quittin.0    Smokeless tobacco: Never    Tobacco comments:     4 cigs daily  2/4/15   Vaping Use    Vaping Use: Never used   Substance and Sexual Activity    Alcohol use: Yes     Comment: occassional    Drug use: No    Sexual activity: Yes     Partners: Male     Birth control/protection: Condom, OCP   Other Topics Concern    Not on file   Social History Narrative    Not on file     Social Determinants of Health     Financial Resource Strain: Low Risk     Difficulty of Paying Living Expenses: Not hard at all   Food Insecurity: No Food Insecurity    Worried About Running Out of Food in the Last Year: Never true    [address] in the Last Year: Never true   Transportation Needs: No Transportation Needs    Lack of Transportation (Medical): No    Lack of Transportation (Non-Medical):  No   Physical Activity: Not on file   Stress: Not on file   Social Connections: Not on file   Intimate Partner Violence: Not on file   Housing weight despite calorie restriction and increased physical activity  Discussed that her BMI is under 30 and she has no other chronic overweight/obesity chronic conditions  Discussed medication options including Wellbutrin, metformin, Sandip, Topamax, plenity  Patient opted to try Wellbutrin  Tolerating 300mg without side effects  Noticed slight improvement in appetite suppression  Has noticed significant improvement in her fatigue  Lost 5 pounds in four weeks  She is going to try taking a natural appetite suppression  Follow-up in 8 weeks    2. Overweight (BMI 25.0-29. 9)        There are no discontinued medications.      ---------------------------------------------------------------------  Side effects, adverse effects of the medication prescribed today, as well as treatment plan/ rationale and result expectations have been discussed with the patient who expresses understanding and desires to proceed. Close follow up to evaluate treatment results and for coordination of care. I have reviewed the patient's medical history in detail and updated the computerized patient record. As always, patient is advised that if symptoms worsen in any way they will proceed to the nearest emergency room. --------------------------------------------------------------------    Return in about 2 months (around 9/29/2022) for Weight management 15 min. An  electronic signature was used to authenticate this note.     --Gerson Sanchez DO on 7/29/2022 at 8:21 AM

## 2022-08-05 DIAGNOSIS — R53.83 FATIGUE, UNSPECIFIED TYPE: ICD-10-CM

## 2022-08-05 DIAGNOSIS — E66.3 OVERWEIGHT (BMI 25.0-29.9): ICD-10-CM

## 2022-08-05 RX ORDER — BUPROPION HYDROCHLORIDE 300 MG/1
300 TABLET ORAL EVERY MORNING
Qty: 30 TABLET | Refills: 0 | Status: SHIPPED | OUTPATIENT
Start: 2022-08-05 | End: 2022-09-07 | Stop reason: SDUPTHER

## 2022-09-07 DIAGNOSIS — E66.3 OVERWEIGHT (BMI 25.0-29.9): ICD-10-CM

## 2022-09-07 DIAGNOSIS — R53.83 FATIGUE, UNSPECIFIED TYPE: ICD-10-CM

## 2022-09-08 RX ORDER — BUPROPION HYDROCHLORIDE 300 MG/1
300 TABLET ORAL EVERY MORNING
Qty: 30 TABLET | Refills: 0 | Status: SHIPPED | OUTPATIENT
Start: 2022-09-08 | End: 2022-10-11 | Stop reason: SDUPTHER

## 2022-09-30 ENCOUNTER — OFFICE VISIT (OUTPATIENT)
Dept: FAMILY MEDICINE CLINIC | Age: 37
End: 2022-09-30
Payer: COMMERCIAL

## 2022-09-30 VITALS
TEMPERATURE: 97.2 F | OXYGEN SATURATION: 98 % | HEART RATE: 84 BPM | BODY MASS INDEX: 27.81 KG/M2 | WEIGHT: 162 LBS | DIASTOLIC BLOOD PRESSURE: 74 MMHG | SYSTOLIC BLOOD PRESSURE: 112 MMHG

## 2022-09-30 DIAGNOSIS — E66.3 OVERWEIGHT (BMI 25.0-29.9): ICD-10-CM

## 2022-09-30 DIAGNOSIS — R53.83 FATIGUE, UNSPECIFIED TYPE: Primary | ICD-10-CM

## 2022-09-30 PROCEDURE — 99213 OFFICE O/P EST LOW 20 MIN: CPT | Performed by: STUDENT IN AN ORGANIZED HEALTH CARE EDUCATION/TRAINING PROGRAM

## 2022-09-30 PROCEDURE — 1036F TOBACCO NON-USER: CPT | Performed by: STUDENT IN AN ORGANIZED HEALTH CARE EDUCATION/TRAINING PROGRAM

## 2022-09-30 PROCEDURE — G8419 CALC BMI OUT NRM PARAM NOF/U: HCPCS | Performed by: STUDENT IN AN ORGANIZED HEALTH CARE EDUCATION/TRAINING PROGRAM

## 2022-09-30 PROCEDURE — G8427 DOCREV CUR MEDS BY ELIG CLIN: HCPCS | Performed by: STUDENT IN AN ORGANIZED HEALTH CARE EDUCATION/TRAINING PROGRAM

## 2022-09-30 ASSESSMENT — ENCOUNTER SYMPTOMS
SORE THROAT: 0
ABDOMINAL PAIN: 0
SHORTNESS OF BREATH: 0
VOMITING: 0
SINUS PRESSURE: 0
COUGH: 0

## 2022-09-30 NOTE — PROGRESS NOTES
[address] [place] [address] (:  [id number]) is a 30 y.o. female, here for evaluation of the following medical concerns:  Chief Complaint   Patient presents with    Weight Loss     2 month weight follow up     HPI  Weight loss  Working out 4 times a week in the morning  Calorie restricted 1,100 perico daily  Does eat out about once a week  No history of diabetes, hypertension or high cholesterol    Taking Wellbutrin XL 300mg daily  Tolerating well without side effects  Has lost 6 pounds in the last 3 months    Has noticed slight improvement in appetite suppression   Notices big improvement in her fatigue    Review of Systems   Constitutional:  Positive for unexpected weight change. Negative for chills and fever. HENT:  Negative for congestion, sinus pressure and sore throat. Respiratory:  Negative for cough and shortness of breath. Cardiovascular:  Negative for chest pain and palpitations. Gastrointestinal:  Negative for abdominal pain and vomiting. Musculoskeletal:  Negative for arthralgias and myalgias. Skin:  Negative for rash and wound. Neurological:  Negative for speech difficulty and light-headedness. Psychiatric/Behavioral:  Negative for suicidal ideas. The patient is not nervous/anxious. Prior to Visit Medications    Medication Sig Taking?  Authorizing Provider   buPROPion (WELLBUTRIN XL) 300 MG extended release tablet Take 1 tablet by mouth every morning Yes DOLORES Brunson - CNP   fluticasone (FLONASE) 50 MCG/ACT nasal spray 1 spray by Each Nostril route daily Yes Roslyn Boston MD   cetirizine (ZYRTEC) 10 MG tablet Take 10 mg by mouth daily Yes Historical Provider, MD   norethindrone-ethinyl estradiol-Fe (LO LOESTRIN FE) 1 MG-10 MCG / 10 MCG tablet Take 1 tablet by mouth daily Yes Elroy Herring MD   Probiotic Product (PROBIOTIC-10 PO) Take by mouth Yes Historical Provider, MD   Multiple Vitamins-Minerals (ONE-A-DAY WOMENS VITACRAVES) CHEW Take by mouth Yes Historical Provider, MD   Lansoprazole (PREVACID PO) Take 1 tablet by mouth daily. Yes Historical Provider, MD        There are no discontinued medications. Allergies   Allergen Reactions    Dextromethorphan-Guaifenesin Other (See Comments)     camacho Roland       Past Medical History:   Diagnosis Date    GERD (gastroesophageal reflux disease)     Neuralgia     left optic neuralgia       Past Surgical History:   Procedure Laterality Date    EXCISION / BIOPSY SKIN LESION OF TRUNK N/A 2019    EXCISION OF CHEST WALL MASS X2 performed by Elza Mercedes MD at [facility]  2012    mole removal on back left side     WISDOM TOOTH EXTRACTION         Social History     Socioeconomic History    Marital status: Single     Spouse name: Not on file    Number of children: Not on file    Years of education: Not on file    Highest education level: Not on file   Occupational History    Not on file   Tobacco Use    Smoking status: Former     Packs/day: 0.25     Years: 9.00     Pack years: 2.25     Types: Cigarettes     Quit date: 2017     Years since quittin.1    Smokeless tobacco: Never    Tobacco comments:     4 cigs daily  2/4/15   Vaping Use    Vaping Use: Never used   Substance and Sexual Activity    Alcohol use: Yes     Comment: occassional    Drug use: No    Sexual activity: Yes     Partners: Male     Birth control/protection: Condom, OCP   Other Topics Concern    Not on file   Social History Narrative    Not on file     Social Determinants of Health     Financial Resource Strain: Low Risk     Difficulty of Paying Living Expenses: Not hard at all   Food Insecurity: No Food Insecurity    Worried About Running Out of Food in the Last Year: Never true    [address] in the Last Year: Never true   Transportation Needs: No Transportation Needs    Lack of Transportation (Medical): No    Lack of Transportation (Non-Medical):  No   Physical Activity: Not on file   Stress: Not on file   Social ASSESSMENT/PLAN:  1. Fatigue, unspecified type  Recent labs including fasting sugar and thyroid level normal  Patient endorsing daily fatigue  She is getting frustrated as she seems to be gaining weight despite calorie restriction and increased physical activity  Discussed that her BMI is under 30 and she has no other chronic overweight/obesity chronic conditions  Discussed medication options including Wellbutrin, metformin, Sandip, Topamax, plenity  Patient opted to try Wellbutrin  Tolerating 300mg without side effects  Noticed slight improvement in appetite suppression  Has noticed significant improvement in her fatigue    2. Overweight (BMI 25.0-29. 9)        There are no discontinued medications.      ---------------------------------------------------------------------  Side effects, adverse effects of the medication prescribed today, as well as treatment plan/ rationale and result expectations have been discussed with the patient who expresses understanding and desires to proceed. Close follow up to evaluate treatment results and for coordination of care. I have reviewed the patient's medical history in detail and updated the computerized patient record. As always, patient is advised that if symptoms worsen in any way they will proceed to the nearest emergency room. --------------------------------------------------------------------    Return if symptoms worsen or fail to improve. An  electronic signature was used to authenticate this note.     --Jennifer Macdonald DO on 9/30/2022 at 2:10 PM

## 2022-10-11 DIAGNOSIS — E66.3 OVERWEIGHT (BMI 25.0-29.9): ICD-10-CM

## 2022-10-11 DIAGNOSIS — R53.83 FATIGUE, UNSPECIFIED TYPE: ICD-10-CM

## 2022-10-11 RX ORDER — BUPROPION HYDROCHLORIDE 300 MG/1
300 TABLET ORAL EVERY MORNING
Qty: 30 TABLET | Refills: 0 | Status: SHIPPED | OUTPATIENT
Start: 2022-10-11 | End: 2022-11-10

## 2022-10-11 NOTE — TELEPHONE ENCOUNTER
Spoke to pt, she refused making an appointment since she has been seen so often with Dr. Etta Guzman.      Aubrey Pendleton with Etta Guzman - 9/30/22

## 2022-11-09 DIAGNOSIS — R53.83 FATIGUE, UNSPECIFIED TYPE: ICD-10-CM

## 2022-11-09 DIAGNOSIS — E66.3 OVERWEIGHT (BMI 25.0-29.9): ICD-10-CM

## 2022-11-11 DIAGNOSIS — R53.83 FATIGUE, UNSPECIFIED TYPE: ICD-10-CM

## 2022-11-11 DIAGNOSIS — E66.3 OVERWEIGHT (BMI 25.0-29.9): ICD-10-CM

## 2022-11-11 RX ORDER — BUPROPION HYDROCHLORIDE 300 MG/1
300 TABLET ORAL EVERY MORNING
Qty: 30 TABLET | Refills: 0 | Status: SHIPPED | OUTPATIENT
Start: 2022-11-11 | End: 2022-12-11

## 2022-11-11 RX ORDER — BUPROPION HYDROCHLORIDE 300 MG/1
300 TABLET ORAL EVERY MORNING
Qty: 30 TABLET | Refills: 0 | OUTPATIENT
Start: 2022-11-11 | End: 2022-12-11

## 2022-11-11 NOTE — TELEPHONE ENCOUNTER
Comments: This request is coming from the pharmacy. Last Office Visit (last PCP visit):   9/30/2022    Next Visit Date:  Future Appointments   Date Time Provider Brendan Gil   12/9/2022  9:00 AM Caesar Burt MD [address] [place] [address]       If hasn't been seen in over a year OR hasn't followed up according to last diabetes/ADHD visit, make appointment for patient before sending refill to provider.     Rx requested:  Requested Prescriptions     Pending Prescriptions Disp Refills    buPROPion (WELLBUTRIN XL) 300 MG extended release tablet [Pharmacy Med Name: BUPROPION HCL  MG TABLET] 30 tablet 0     Sig: take 1 tablet by mouth every morning

## 2022-12-09 ENCOUNTER — OFFICE VISIT (OUTPATIENT)
Dept: OBGYN CLINIC | Age: 37
End: 2022-12-09

## 2022-12-09 VITALS
SYSTOLIC BLOOD PRESSURE: 120 MMHG | HEIGHT: 60 IN | BODY MASS INDEX: 31.22 KG/M2 | WEIGHT: 159 LBS | DIASTOLIC BLOOD PRESSURE: 72 MMHG

## 2022-12-09 DIAGNOSIS — Z01.419 ENCOUNTER FOR WELL WOMAN EXAM WITH ROUTINE GYNECOLOGICAL EXAM: Primary | ICD-10-CM

## 2022-12-09 DIAGNOSIS — Z30.41 ENCOUNTER FOR SURVEILLANCE OF CONTRACEPTIVE PILLS: ICD-10-CM

## 2022-12-09 RX ORDER — NORETHINDRONE ACETATE AND ETHINYL ESTRADIOL, ETHINYL ESTRADIOL AND FERROUS FUMARATE 1MG-10(24)
1 KIT ORAL DAILY
Qty: 84 TABLET | Refills: 3 | Status: SHIPPED | OUTPATIENT
Start: 2022-12-09

## 2022-12-09 ASSESSMENT — ENCOUNTER SYMPTOMS
DIARRHEA: 0
BLOOD IN STOOL: 0
ABDOMINAL PAIN: 0
NAUSEA: 0
EYES NEGATIVE: 1
ANAL BLEEDING: 0
CONSTIPATION: 0
RESPIRATORY NEGATIVE: 1
ALLERGIC/IMMUNOLOGIC NEGATIVE: 1
RECTAL PAIN: 0
VOMITING: 0
ABDOMINAL DISTENTION: 0

## 2022-12-09 ASSESSMENT — VISUAL ACUITY: OU: 1

## 2022-12-09 NOTE — PROGRESS NOTES
The patient was asked if she would like a chaperone present for her intimate exam. She  Declined the chaperone.  Charley Garland CMA ([address] [place] [address])

## 2022-12-09 NOTE — PROGRESS NOTES
Subjective:      Patient ID: Ricardo Johnson is a 30 y.o. female    Annual exam. Reviewed medical, surgical, social and family history. Also reviewed current medications and allergies. No GYN complaints. Normal cycles on OCP. Pap deferred ( negative co-testing 2021; next pap 2026 ). STD screening offered. Monthly SBE encouraged. F/U annual or prn. Vitals:  /72   Ht 5' (1.524 m)   Wt 159 lb (72.1 kg)   BMI 31.05 kg/m²   Past Medical History:   Diagnosis Date    GERD (gastroesophageal reflux disease)     Neuralgia     left optic neuralgia     Past Surgical History:   Procedure Laterality Date    EXCISION / BIOPSY SKIN LESION OF TRUNK N/A 4/5/2019    EXCISION OF CHEST WALL MASS X2 performed by Vera Stephenson MD at [facility]  8/2012    mole removal on back left side     WISDOM TOOTH EXTRACTION  2005     Allergies:  Dextromethorphan-guaifenesin  Current Outpatient Medications   Medication Sig Dispense Refill    norethindrone-ethinyl estradiol-Fe (LO LOESTRIN FE) 1 MG-10 MCG / 10 MCG tablet Take 1 tablet by mouth daily 84 tablet 3    buPROPion (WELLBUTRIN XL) 300 MG extended release tablet take 1 tablet by mouth every morning 30 tablet 0    fluticasone (FLONASE) 50 MCG/ACT nasal spray 1 spray by Each Nostril route daily 32 g 5    cetirizine (ZYRTEC) 10 MG tablet Take 10 mg by mouth daily      Probiotic Product (PROBIOTIC-10 PO) Take by mouth      Multiple Vitamins-Minerals (ONE-A-DAY WOMENS VITACRAVES) CHEW Take by mouth      Lansoprazole (PREVACID PO) Take 1 tablet by mouth daily. No current facility-administered medications for this visit.      Social History     Socioeconomic History    Marital status: Single     Spouse name: Not on file    Number of children: Not on file    Years of education: Not on file    Highest education level: Not on file   Occupational History    Not on file   Tobacco Use    Smoking status: Former     Packs/day: 0.25     Years: 9.00     Pack years: 2.25     Types: Cigarettes     Quit date: 2017     Years since quittin.3    Smokeless tobacco: Never    Tobacco comments:     4 cigs daily  2/4/15   Vaping Use    Vaping Use: Never used   Substance and Sexual Activity    Alcohol use: Yes     Comment: occassional    Drug use: No    Sexual activity: Yes     Partners: Male     Birth control/protection: Condom, OCP   Other Topics Concern    Not on file   Social History Narrative    Not on file     Social Determinants of Health     Financial Resource Strain: Low Risk     Difficulty of Paying Living Expenses: Not hard at all   Food Insecurity: No Food Insecurity    Worried About [address] in the Last Year: Never true    [address] in the Last Year: Never true   Transportation Needs: No Transportation Needs    Lack of Transportation (Medical): No    Lack of Transportation (Non-Medical): No   Physical Activity: Not on file   Stress: Not on file   Social Connections: Not on file   Intimate Partner Violence: Not on file   Housing Stability: Not on file     Family History   Problem Relation Age of Onset    High Blood Pressure Mother     No Known Problems Father     Cancer Paternal Aunt         rigoberto lymp    Heart Disease Maternal Grandfather         bi pass    Stroke Maternal Grandfather         2012 mini stroke    Diabetes Maternal Grandfather     Cancer Paternal Grandmother         lymphomia    Diabetes Paternal Grandfather        Review of Systems   Constitutional: Negative. Negative for activity change, appetite change, chills, diaphoresis, fatigue, fever and unexpected weight change. HENT: Negative. Eyes: Negative. Respiratory: Negative. Cardiovascular: Negative. Gastrointestinal:  Negative for abdominal distention, abdominal pain, anal bleeding, blood in stool, constipation, diarrhea, nausea, rectal pain and vomiting. Endocrine: Negative.     Genitourinary:  Negative for decreased urine volume, difficulty urinating, dyspareunia, dysuria, enuresis, flank pain, frequency, genital sores, hematuria, menstrual problem, pelvic pain, urgency, vaginal bleeding, vaginal discharge and vaginal pain. Musculoskeletal: Negative. Skin: Negative. Allergic/Immunologic: Negative. Neurological: Negative. Hematological: Negative. Psychiatric/Behavioral: Negative. Objective:     Physical Exam  Constitutional:       Appearance: She is well-developed. HENT:      Head: Normocephalic. Eyes:      General: Lids are normal. Vision grossly intact. Neck:      Thyroid: No thyromegaly. Cardiovascular:      Rate and Rhythm: Normal rate and regular rhythm. Heart sounds: Normal heart sounds. Pulmonary:      Effort: Pulmonary effort is normal. No respiratory distress. Breath sounds: Normal breath sounds. No wheezing or rales. Chest:      Chest wall: No tenderness. Breasts:     Right: Normal. No swelling, bleeding, inverted nipple, mass, nipple discharge, skin change or tenderness. Left: Normal. No swelling, bleeding, inverted nipple, mass, nipple discharge, skin change or tenderness. Abdominal:      General: There is no distension. Palpations: Abdomen is soft. There is no mass. Tenderness: There is no abdominal tenderness. There is no guarding or rebound. Hernia: No hernia is present. There is no hernia in the left inguinal area or right inguinal area. Genitourinary:     General: Normal vulva. Pubic Area: No rash. Labia:         Right: No rash, tenderness, lesion or injury. Left: No rash, tenderness, lesion or injury. Urethra: No prolapse, urethral swelling or urethral lesion. Vagina: Normal. No signs of injury and foreign body. No vaginal discharge, erythema, tenderness or bleeding. Cervix: No cervical motion tenderness, discharge or friability. Uterus: Not deviated, not enlarged, not fixed and not tender.        Adnexa:         Right: No mass, tenderness or fullness. Left: No mass, tenderness or fullness. Rectum: Normal.   Musculoskeletal:         General: No tenderness. Normal range of motion. Cervical back: Normal range of motion and neck supple. Lymphadenopathy:      Cervical: No cervical adenopathy. Upper Body:      Right upper body: No supraclavicular or axillary adenopathy. Left upper body: No supraclavicular or axillary adenopathy. Lower Body: No right inguinal adenopathy. No left inguinal adenopathy. Skin:     General: Skin is warm and dry. Coloration: Skin is not pale. Findings: No erythema or rash. Neurological:      Mental Status: She is alert and oriented to person, place, and time. Psychiatric:         Behavior: Behavior normal.         Thought Content: Thought content normal.         Judgment: Judgment normal.       Assessment:       Diagnosis Orders   1. Encounter for well woman exam with routine gynecological exam        2. Encounter for surveillance of contraceptive pills             Plan:      Medications placedthis encounter:  Orders Placed This Encounter   Medications    norethindrone-ethinyl estradiol-Fe (LO LOESTRIN FE) 1 MG-10 MCG / 10 MCG tablet     Sig: Take 1 tablet by mouth daily     Dispense:  84 tablet     Refill:  3         Orders placedthis encounter:  No orders of the defined types were placed in this encounter. Follow up:  Return in about 1 year (around 12/9/2023) for Annual.  Repeat Annual GYN exam every 1 year. Cervical Cytology exam starts age 24. If Negative Cytology;  follow-up screening per current guidelines. Mammograms yearly starting at age 36. Calcium and Vitamin D dosing reviewed ( age appropriate ). Colonoscopy and bone density screening discussed ( age appropriate ). Birth control and STD prevention discussed ( age appropriate ). Gardisil counseling completed for all patients ( age appropriate ).     Routine health maintenance ( per PCP and guidelines ).

## 2022-12-21 DIAGNOSIS — E66.3 OVERWEIGHT (BMI 25.0-29.9): ICD-10-CM

## 2022-12-21 DIAGNOSIS — R53.83 FATIGUE, UNSPECIFIED TYPE: ICD-10-CM

## 2022-12-21 RX ORDER — BUPROPION HYDROCHLORIDE 300 MG/1
300 TABLET ORAL EVERY MORNING
Qty: 30 TABLET | Refills: 0 | Status: SHIPPED | OUTPATIENT
Start: 2022-12-21 | End: 2023-01-20

## 2022-12-21 NOTE — TELEPHONE ENCOUNTER
Steffanie WILL [address] [phone] - F [phone]          Medication Refill  (Newest Message First)  Thomas Murcia  Patient Medication Renewal Request Pool 2 hours ago (7:21 AM)     Refills have been requested for the following medications:         buPROPion (WELLBUTRIN XL) 300 MG extended release tablet Tonya Fitzgerald, APRN - CNP]     Preferred pharmacy: [address] [place] [address] Steffanie Kincaid [address] [phone] - F [phone]        Future Appointments    Encounter Information    Provider Department Appt Notes   12/11/2023 MD DEVIN Hartmann [facility] annual     Past Visits    Date Provider Specialty Visit Type Primary Dx   12/09/2022 Alpa Stovall MD Obstetrics and Gynecology Office Visit Encounter for well woman exam with routine gynecological exam   09/30/2022 Tona Pedro DO Family Medicine Office Visit Fatigue, unspecified type   07/29/2022 Tona Pedro DO Family Medicine Office Visit Fatigue, unspecified type   06/27/2022 Tona Pedro DO Family Medicine Office Visit Fatigue, unspecified type   06/06/2022 Tona Pedro DO Family Medicine Office Visit Fatigue, unspecified type

## 2023-01-31 DIAGNOSIS — R53.83 FATIGUE, UNSPECIFIED TYPE: ICD-10-CM

## 2023-01-31 DIAGNOSIS — E66.3 OVERWEIGHT (BMI 25.0-29.9): ICD-10-CM

## 2023-02-03 RX ORDER — BUPROPION HYDROCHLORIDE 300 MG/1
300 TABLET ORAL EVERY MORNING
Qty: 30 TABLET | Refills: 0 | Status: SHIPPED | OUTPATIENT
Start: 2023-02-03 | End: 2023-03-05

## 2023-03-15 DIAGNOSIS — R53.83 FATIGUE, UNSPECIFIED TYPE: ICD-10-CM

## 2023-03-15 DIAGNOSIS — E66.3 OVERWEIGHT (BMI 25.0-29.9): ICD-10-CM

## 2023-03-15 RX ORDER — BUPROPION HYDROCHLORIDE 300 MG/1
300 TABLET ORAL EVERY MORNING
Qty: 30 TABLET | Refills: 0 | Status: CANCELLED | OUTPATIENT
Start: 2023-03-15 | End: 2023-04-14

## 2023-03-16 DIAGNOSIS — R53.83 FATIGUE, UNSPECIFIED TYPE: ICD-10-CM

## 2023-03-16 DIAGNOSIS — E66.3 OVERWEIGHT (BMI 25.0-29.9): ICD-10-CM

## 2023-03-17 RX ORDER — BUPROPION HYDROCHLORIDE 300 MG/1
300 TABLET ORAL EVERY MORNING
Qty: 30 TABLET | Refills: 0 | Status: SHIPPED | OUTPATIENT
Start: 2023-03-17 | End: 2023-04-16

## 2023-03-23 ENCOUNTER — OFFICE VISIT (OUTPATIENT)
Dept: FAMILY MEDICINE CLINIC | Age: 38
End: 2023-03-23
Payer: COMMERCIAL

## 2023-03-23 VITALS
SYSTOLIC BLOOD PRESSURE: 122 MMHG | OXYGEN SATURATION: 98 % | HEIGHT: 64 IN | DIASTOLIC BLOOD PRESSURE: 82 MMHG | WEIGHT: 153 LBS | BODY MASS INDEX: 26.12 KG/M2 | HEART RATE: 78 BPM | TEMPERATURE: 98.6 F

## 2023-03-23 DIAGNOSIS — E66.3 OVERWEIGHT (BMI 25.0-29.9): ICD-10-CM

## 2023-03-23 DIAGNOSIS — R53.83 FATIGUE, UNSPECIFIED TYPE: ICD-10-CM

## 2023-03-23 PROCEDURE — G8427 DOCREV CUR MEDS BY ELIG CLIN: HCPCS | Performed by: STUDENT IN AN ORGANIZED HEALTH CARE EDUCATION/TRAINING PROGRAM

## 2023-03-23 PROCEDURE — G8417 CALC BMI ABV UP PARAM F/U: HCPCS | Performed by: STUDENT IN AN ORGANIZED HEALTH CARE EDUCATION/TRAINING PROGRAM

## 2023-03-23 PROCEDURE — G8484 FLU IMMUNIZE NO ADMIN: HCPCS | Performed by: STUDENT IN AN ORGANIZED HEALTH CARE EDUCATION/TRAINING PROGRAM

## 2023-03-23 PROCEDURE — 99213 OFFICE O/P EST LOW 20 MIN: CPT | Performed by: STUDENT IN AN ORGANIZED HEALTH CARE EDUCATION/TRAINING PROGRAM

## 2023-03-23 PROCEDURE — 1036F TOBACCO NON-USER: CPT | Performed by: STUDENT IN AN ORGANIZED HEALTH CARE EDUCATION/TRAINING PROGRAM

## 2023-03-23 RX ORDER — BUPROPION HYDROCHLORIDE 300 MG/1
300 TABLET ORAL EVERY MORNING
Qty: 30 TABLET | Refills: 11 | Status: SHIPPED | OUTPATIENT
Start: 2023-03-23 | End: 2023-04-22

## 2023-03-23 SDOH — ECONOMIC STABILITY: HOUSING INSECURITY
IN THE LAST 12 MONTHS, WAS THERE A TIME WHEN YOU DID NOT HAVE A STEADY PLACE TO SLEEP OR SLEPT IN A SHELTER (INCLUDING NOW)?: NO

## 2023-03-23 SDOH — ECONOMIC STABILITY: FOOD INSECURITY: WITHIN THE PAST 12 MONTHS, THE FOOD YOU BOUGHT JUST DIDN'T LAST AND YOU DIDN'T HAVE MONEY TO GET MORE.: NEVER TRUE

## 2023-03-23 SDOH — ECONOMIC STABILITY: FOOD INSECURITY: WITHIN THE PAST 12 MONTHS, YOU WORRIED THAT YOUR FOOD WOULD RUN OUT BEFORE YOU GOT MONEY TO BUY MORE.: NEVER TRUE

## 2023-03-23 ASSESSMENT — PATIENT HEALTH QUESTIONNAIRE - PHQ9
SUM OF ALL RESPONSES TO PHQ9 QUESTIONS 1 & 2: 0
SUM OF ALL RESPONSES TO PHQ QUESTIONS 1-9: 0
2. FEELING DOWN, DEPRESSED OR HOPELESS: 0
SUM OF ALL RESPONSES TO PHQ QUESTIONS 1-9: 0
1. LITTLE INTEREST OR PLEASURE IN DOING THINGS: 0

## 2023-03-23 ASSESSMENT — ENCOUNTER SYMPTOMS
SINUS PRESSURE: 0
SORE THROAT: 0
ABDOMINAL PAIN: 0
SHORTNESS OF BREATH: 0
VOMITING: 0
COUGH: 0

## 2023-03-23 NOTE — PROGRESS NOTES
Lack of Transportation (Non-Medical): No   Physical Activity: Not on file   Stress: Not on file   Social Connections: Not on file   Intimate Partner Violence: Not on file   Housing Stability: Unknown    Unable to Pay for Housing in the Last Year: Not on file    Number of Places Lived in the Last Year: Not on file    Unstable Housing in the Last Year: No        Family History   Problem Relation Age of Onset    High Blood Pressure Mother     No Known Problems Father     Cancer Paternal Aunt         rigoberto lymp    Heart Disease Maternal Grandfather         bi pass    Stroke Maternal Grandfather         8/2012 mini stroke    Diabetes Maternal Grandfather     Cancer Paternal Grandmother         lymphomia    Diabetes Paternal Grandfather        Vitals:    03/23/23 1139   BP: 122/82   Pulse: 78   Temp: 98.6 °F (37 °C)   SpO2: 98%   Weight: 153 lb (69.4 kg)   Height: 5' 4\" (1.626 m)     Starting weight: 170 lbs    Weight loss: -17 lbs  Weight loss percentage: -10%    Estimated body mass index is 26.26 kg/m² as calculated from the following:    Height as of this encounter: 5' 4\" (1.626 m). Weight as of this encounter: 153 lb (69.4 kg). Additional Measurements    03/23/23 1143   Waist (Inches): 33 in     No results for input(s): WBC, RBC, HGB, HCT, MCV, MCH, MCHC, RDW, PLT, MPV in the last 72 hours. No results for input(s): NA, K, CL, CO2, BUN, CREATININE, GLUCOSE, CALCIUM, PROT, LABALBU, BILITOT, ALKPHOS, AST, ALT in the last 72 hours. Lab Results   Component Value Date/Time    LABA1C 5.5 02/07/2019 01:29 PM     No results found. Physical Exam  Constitutional:       General: She is not in acute distress. Appearance: Normal appearance. HENT:      Head: Normocephalic and atraumatic. Eyes:      Extraocular Movements: Extraocular movements intact. Conjunctiva/sclera: Conjunctivae normal.   Musculoskeletal:         General: No deformity. Normal range of motion.    Skin:     Findings: No lesion or

## 2023-04-18 ENCOUNTER — OFFICE VISIT (OUTPATIENT)
Dept: FAMILY MEDICINE CLINIC | Age: 38
End: 2023-04-18
Payer: COMMERCIAL

## 2023-04-18 VITALS
OXYGEN SATURATION: 99 % | HEART RATE: 91 BPM | HEIGHT: 64 IN | SYSTOLIC BLOOD PRESSURE: 130 MMHG | WEIGHT: 154 LBS | BODY MASS INDEX: 26.29 KG/M2 | DIASTOLIC BLOOD PRESSURE: 84 MMHG | TEMPERATURE: 97.3 F

## 2023-04-18 DIAGNOSIS — J01.40 ACUTE NON-RECURRENT PANSINUSITIS: Primary | ICD-10-CM

## 2023-04-18 PROCEDURE — G8427 DOCREV CUR MEDS BY ELIG CLIN: HCPCS | Performed by: NURSE PRACTITIONER

## 2023-04-18 PROCEDURE — G8417 CALC BMI ABV UP PARAM F/U: HCPCS | Performed by: NURSE PRACTITIONER

## 2023-04-18 PROCEDURE — 99213 OFFICE O/P EST LOW 20 MIN: CPT | Performed by: NURSE PRACTITIONER

## 2023-04-18 PROCEDURE — 1036F TOBACCO NON-USER: CPT | Performed by: NURSE PRACTITIONER

## 2023-04-18 RX ORDER — AMOXICILLIN AND CLAVULANATE POTASSIUM 875; 125 MG/1; MG/1
1 TABLET, FILM COATED ORAL 2 TIMES DAILY
Qty: 14 TABLET | Refills: 0 | Status: SHIPPED | OUTPATIENT
Start: 2023-04-18 | End: 2023-04-25

## 2023-04-18 ASSESSMENT — ENCOUNTER SYMPTOMS
WHEEZING: 0
DIARRHEA: 0
SHORTNESS OF BREATH: 0
ABDOMINAL PAIN: 0
SORE THROAT: 1
TROUBLE SWALLOWING: 0
EYE DISCHARGE: 1
COUGH: 1
CHEST TIGHTNESS: 0
SINUS PAIN: 1
SINUS PRESSURE: 1
BACK PAIN: 0
RHINORRHEA: 1
EYE PAIN: 0
CONSTIPATION: 0
COLOR CHANGE: 0

## 2023-04-18 NOTE — PROGRESS NOTES
875-125 MG per tablet; Take 1 tablet by mouth 2 times daily for 7 days  Dispense: 14 tablet; Refill: 0    Side effects, adverse effects of the medication prescribed today, as well as treatment plan/ rationale and result expectations have been discussed with the patient who expresses understanding and desires to proceed. Close follow up to evaluate treatment results and for coordination of care. I have reviewed the patient's medical history in detail and updated the computerized patient record. As always, patient is advised that if symptoms worsen in any way they will proceed to the nearest emergency room. No orders of the defined types were placed in this encounter. Orders Placed This Encounter   Medications    amoxicillin-clavulanate (AUGMENTIN) 875-125 MG per tablet     Sig: Take 1 tablet by mouth 2 times daily for 7 days     Dispense:  14 tablet     Refill:  0       There are no discontinued medications. Return if symptoms worsen or fail to improve.     David Carter, APRN - CNP

## 2023-05-18 ENCOUNTER — OFFICE VISIT (OUTPATIENT)
Dept: FAMILY MEDICINE CLINIC | Age: 38
End: 2023-05-18
Payer: COMMERCIAL

## 2023-05-18 VITALS
SYSTOLIC BLOOD PRESSURE: 134 MMHG | HEART RATE: 87 BPM | BODY MASS INDEX: 25.61 KG/M2 | HEIGHT: 64 IN | OXYGEN SATURATION: 99 % | WEIGHT: 150 LBS | DIASTOLIC BLOOD PRESSURE: 72 MMHG | TEMPERATURE: 98.7 F

## 2023-05-18 DIAGNOSIS — J02.9 SORE THROAT: Primary | ICD-10-CM

## 2023-05-18 DIAGNOSIS — J30.9 ALLERGIC RHINITIS, UNSPECIFIED SEASONALITY, UNSPECIFIED TRIGGER: ICD-10-CM

## 2023-05-18 DIAGNOSIS — J02.9 SORE THROAT: ICD-10-CM

## 2023-05-18 LAB — S PYO AG THROAT QL: NORMAL

## 2023-05-18 PROCEDURE — 99214 OFFICE O/P EST MOD 30 MIN: CPT | Performed by: FAMILY MEDICINE

## 2023-05-18 PROCEDURE — G8417 CALC BMI ABV UP PARAM F/U: HCPCS | Performed by: FAMILY MEDICINE

## 2023-05-18 PROCEDURE — 87880 STREP A ASSAY W/OPTIC: CPT | Performed by: FAMILY MEDICINE

## 2023-05-18 PROCEDURE — G8427 DOCREV CUR MEDS BY ELIG CLIN: HCPCS | Performed by: FAMILY MEDICINE

## 2023-05-18 PROCEDURE — 1036F TOBACCO NON-USER: CPT | Performed by: FAMILY MEDICINE

## 2023-05-18 SDOH — ECONOMIC STABILITY: INCOME INSECURITY: HOW HARD IS IT FOR YOU TO PAY FOR THE VERY BASICS LIKE FOOD, HOUSING, MEDICAL CARE, AND HEATING?: NOT HARD AT ALL

## 2023-05-18 SDOH — ECONOMIC STABILITY: FOOD INSECURITY: WITHIN THE PAST 12 MONTHS, YOU WORRIED THAT YOUR FOOD WOULD RUN OUT BEFORE YOU GOT MONEY TO BUY MORE.: NEVER TRUE

## 2023-05-18 SDOH — ECONOMIC STABILITY: FOOD INSECURITY: WITHIN THE PAST 12 MONTHS, THE FOOD YOU BOUGHT JUST DIDN'T LAST AND YOU DIDN'T HAVE MONEY TO GET MORE.: NEVER TRUE

## 2023-05-21 LAB — BACTERIA THROAT AEROBE CULT: NORMAL

## 2023-12-11 ENCOUNTER — OFFICE VISIT (OUTPATIENT)
Dept: OBGYN CLINIC | Age: 38
End: 2023-12-11
Payer: COMMERCIAL

## 2023-12-11 VITALS
HEIGHT: 64 IN | DIASTOLIC BLOOD PRESSURE: 84 MMHG | BODY MASS INDEX: 26.8 KG/M2 | SYSTOLIC BLOOD PRESSURE: 136 MMHG | WEIGHT: 157 LBS

## 2023-12-11 DIAGNOSIS — Z01.419 ENCOUNTER FOR WELL WOMAN EXAM WITH ROUTINE GYNECOLOGICAL EXAM: Primary | ICD-10-CM

## 2023-12-11 PROCEDURE — G8484 FLU IMMUNIZE NO ADMIN: HCPCS | Performed by: OBSTETRICS & GYNECOLOGY

## 2023-12-11 PROCEDURE — 99395 PREV VISIT EST AGE 18-39: CPT | Performed by: OBSTETRICS & GYNECOLOGY

## 2023-12-11 RX ORDER — NORETHINDRONE ACETATE AND ETHINYL ESTRADIOL, ETHINYL ESTRADIOL AND FERROUS FUMARATE 1MG-10(24)
1 KIT ORAL DAILY
Qty: 84 TABLET | Refills: 3 | Status: SHIPPED | OUTPATIENT
Start: 2023-12-11

## 2023-12-11 RX ORDER — MAGNESIUM 30 MG
30 TABLET ORAL 2 TIMES DAILY
COMMUNITY

## 2023-12-11 RX ORDER — BUPROPION HYDROCHLORIDE 300 MG/1
300 TABLET ORAL EVERY MORNING
COMMUNITY
Start: 2023-10-18

## 2023-12-11 ASSESSMENT — ENCOUNTER SYMPTOMS
CONSTIPATION: 0
ABDOMINAL PAIN: 0
RECTAL PAIN: 0
VOMITING: 0
EYES NEGATIVE: 1
BLOOD IN STOOL: 0
ANAL BLEEDING: 0
ABDOMINAL DISTENTION: 0
NAUSEA: 0
ALLERGIC/IMMUNOLOGIC NEGATIVE: 1
RESPIRATORY NEGATIVE: 1
DIARRHEA: 0

## 2023-12-11 ASSESSMENT — VISUAL ACUITY: OU: 1

## 2023-12-11 NOTE — PROGRESS NOTES
The patient was asked if she would like a chaperone present for her intimate exam. She  Declined the chaperone.  Aubrey Gomez CMA ([facility])
on file   Tobacco Use    Smoking status: Former     Packs/day: 0.25     Years: 9.00     Additional pack years: 0.00     Total pack years: 2.25     Types: Cigarettes     Quit date: 2017     Years since quittin.3    Smokeless tobacco: Never    Tobacco comments:     4 cigs daily  2/4/15   Vaping Use    Vaping Use: Never used   Substance and Sexual Activity    Alcohol use: Yes     Comment: occassional    Drug use: No    Sexual activity: Yes     Partners: Male     Birth control/protection: Condom, OCP   Other Topics Concern    Not on file   Social History Narrative    Not on file     Social Determinants of Health     Financial Resource Strain: Low Risk  (2023)    Overall Financial Resource Strain (CARDIA)     Difficulty of Paying Living Expenses: Not hard at all   Food Insecurity: Not on file (2023)   Transportation Needs: Unknown (2023)    PRAPARE - Transportation     Lack of Transportation (Medical): Not on file     Lack of Transportation (Non-Medical): No   Physical Activity: Not on file   Stress: Not on file   Social Connections: Not on file   Intimate Partner Violence: Not on file   Housing Stability: Unknown (2023)    Housing Stability Vital Sign     Unable to Pay for Housing in the Last Year: Not on file     Number of [address] in the Last Year: Not on file     Unstable Housing in the Last Year: No     Family History   Problem Relation Age of Onset    High Blood Pressure Mother     No Known Problems Father     Cancer Paternal Aunt         rigoberto lymp    Heart Disease Maternal Grandfather         bi pass    Stroke Maternal Grandfather         2012 mini stroke    Diabetes Maternal Grandfather     Cancer Paternal Grandmother         lymphomia    Diabetes Paternal Grandfather        Review of Systems   Constitutional: Negative. Negative for activity change, appetite change, chills, diaphoresis, fatigue, fever and unexpected weight change. HENT: Negative. Eyes: Negative.

## 2024-03-21 ENCOUNTER — OFFICE VISIT (OUTPATIENT)
Dept: FAMILY MEDICINE CLINIC | Age: 39
End: 2024-03-21
Payer: COMMERCIAL

## 2024-03-21 VITALS
SYSTOLIC BLOOD PRESSURE: 112 MMHG | WEIGHT: 153.8 LBS | BODY MASS INDEX: 26.26 KG/M2 | OXYGEN SATURATION: 99 % | HEART RATE: 90 BPM | HEIGHT: 64 IN | DIASTOLIC BLOOD PRESSURE: 64 MMHG

## 2024-03-21 DIAGNOSIS — Z13.220 LIPID SCREENING: ICD-10-CM

## 2024-03-21 DIAGNOSIS — Z00.00 ENCOUNTER FOR PREVENTIVE HEALTH EXAMINATION: Primary | ICD-10-CM

## 2024-03-21 DIAGNOSIS — Z00.00 ENCOUNTER FOR PREVENTIVE HEALTH EXAMINATION: ICD-10-CM

## 2024-03-21 DIAGNOSIS — R53.83 FATIGUE, UNSPECIFIED TYPE: ICD-10-CM

## 2024-03-21 LAB
ALBUMIN SERPL-MCNC: 4.7 G/DL (ref 3.5–4.6)
ALP SERPL-CCNC: 55 U/L (ref 40–130)
ALT SERPL-CCNC: 50 U/L (ref 0–33)
ANION GAP SERPL CALCULATED.3IONS-SCNC: 13 MEQ/L (ref 9–15)
AST SERPL-CCNC: 32 U/L (ref 0–35)
BILIRUB SERPL-MCNC: 0.7 MG/DL (ref 0.2–0.7)
BUN SERPL-MCNC: 19 MG/DL (ref 6–20)
CALCIUM SERPL-MCNC: 9.7 MG/DL (ref 8.5–9.9)
CHLORIDE SERPL-SCNC: 101 MEQ/L (ref 95–107)
CHOLEST SERPL-MCNC: 159 MG/DL (ref 0–199)
CO2 SERPL-SCNC: 25 MEQ/L (ref 20–31)
CREAT SERPL-MCNC: 1 MG/DL (ref 0.5–0.9)
ERYTHROCYTE [DISTWIDTH] IN BLOOD BY AUTOMATED COUNT: 11.9 % (ref 11.5–14.5)
GLOBULIN SER CALC-MCNC: 2.6 G/DL (ref 2.3–3.5)
GLUCOSE SERPL-MCNC: 83 MG/DL (ref 70–99)
HCT VFR BLD AUTO: 44.2 % (ref 37–47)
HDLC SERPL-MCNC: 79 MG/DL (ref 40–59)
HGB BLD-MCNC: 14.7 G/DL (ref 12–16)
LDLC SERPL CALC-MCNC: 58 MG/DL (ref 0–129)
MCH RBC QN AUTO: 30.1 PG (ref 27–31.3)
MCHC RBC AUTO-ENTMCNC: 33.3 % (ref 33–37)
MCV RBC AUTO: 90.6 FL (ref 79.4–94.8)
PLATELET # BLD AUTO: 215 K/UL (ref 130–400)
POTASSIUM SERPL-SCNC: 4.7 MEQ/L (ref 3.4–4.9)
PROT SERPL-MCNC: 7.3 G/DL (ref 6.3–8)
RBC # BLD AUTO: 4.88 M/UL (ref 4.2–5.4)
SODIUM SERPL-SCNC: 139 MEQ/L (ref 135–144)
TRIGL SERPL-MCNC: 110 MG/DL (ref 0–150)
TSH REFLEX: 1.74 UIU/ML (ref 0.44–3.86)
WBC # BLD AUTO: 6.5 K/UL (ref 4.8–10.8)

## 2024-03-21 PROCEDURE — 99395 PREV VISIT EST AGE 18-39: CPT | Performed by: NURSE PRACTITIONER

## 2024-03-21 PROCEDURE — G8484 FLU IMMUNIZE NO ADMIN: HCPCS | Performed by: NURSE PRACTITIONER

## 2024-03-21 RX ORDER — BUPROPION HYDROCHLORIDE 300 MG/1
300 TABLET ORAL EVERY MORNING
Qty: 30 TABLET | Refills: 11 | Status: SHIPPED | OUTPATIENT
Start: 2024-03-21

## 2024-03-21 ASSESSMENT — ENCOUNTER SYMPTOMS
COLOR CHANGE: 0
ABDOMINAL PAIN: 0
EYE PAIN: 0
TROUBLE SWALLOWING: 0
CHEST TIGHTNESS: 0
CONSTIPATION: 0
COUGH: 0
SHORTNESS OF BREATH: 0
DIARRHEA: 0
BACK PAIN: 0

## 2024-03-21 ASSESSMENT — PATIENT HEALTH QUESTIONNAIRE - PHQ9
1. LITTLE INTEREST OR PLEASURE IN DOING THINGS: NOT AT ALL
2. FEELING DOWN, DEPRESSED OR HOPELESS: NOT AT ALL
SUM OF ALL RESPONSES TO PHQ QUESTIONS 1-9: 0
SUM OF ALL RESPONSES TO PHQ9 QUESTIONS 1 & 2: 0

## 2024-03-21 NOTE — PROGRESS NOTES
Question:   Is Patient Fasting?/# of Hours     Answer:   10    TSH with Reflex     Standing Status:   Future     Number of Occurrences:   1     Standing Expiration Date:   3/21/2025       Orders Placed This Encounter   Medications    buPROPion (WELLBUTRIN XL) 300 MG extended release tablet     Sig: Take 1 tablet by mouth every morning     Dispense:  30 tablet     Refill:  11       Medications Discontinued During This Encounter   Medication Reason    norethindrone-ethinyl estradiol-Fe (LO LOESTRIN FE) 1 MG-10 MCG / 10 MCG tablet Patient Choice    buPROPion (WELLBUTRIN XL) 300 MG extended release tablet REORDER       Return in about 1 year (around 3/21/2025) for annual exam.    Nona Prieto, APRN - CNP

## 2024-03-22 LAB — VITAMIN D 25-HYDROXY: 40 NG/ML (ref 30–100)

## 2024-04-22 NOTE — TELEPHONE ENCOUNTER
Comments:     Last Office Visit (last PCP visit):   3/21/2024    Next Visit Date:  Future Appointments   Date Time Provider Department Center   12/12/2024 10:15 AM Anshul, Ellyn A, MD [facility]   3/21/2025  1:45 PM Nona Prieto, APRN - CNP [facility] Yamilet Carpenter       **If hasn't been seen in over a year OR hasn't followed up according to last diabetes/ADHD visit, make appointment for patient before sending refill to provider.    Rx requested:  Requested Prescriptions     Pending Prescriptions Disp Refills    buPROPion (WELLBUTRIN XL) 300 MG extended release tablet 30 tablet 11     Sig: Take 1 tablet by mouth every morning

## 2024-04-23 RX ORDER — BUPROPION HYDROCHLORIDE 300 MG/1
300 TABLET ORAL EVERY MORNING
Qty: 30 TABLET | Refills: 11 | Status: SHIPPED | OUTPATIENT
Start: 2024-04-23

## 2024-08-02 ENCOUNTER — OFFICE VISIT (OUTPATIENT)
Dept: FAMILY MEDICINE CLINIC | Age: 39
End: 2024-08-02
Payer: COMMERCIAL

## 2024-08-02 VITALS
DIASTOLIC BLOOD PRESSURE: 80 MMHG | SYSTOLIC BLOOD PRESSURE: 118 MMHG | HEIGHT: 64 IN | HEART RATE: 79 BPM | TEMPERATURE: 98 F | OXYGEN SATURATION: 99 % | BODY MASS INDEX: 27.72 KG/M2 | WEIGHT: 162.4 LBS

## 2024-08-02 DIAGNOSIS — H01.004 BLEPHARITIS OF LEFT UPPER EYELID, UNSPECIFIED TYPE: Primary | ICD-10-CM

## 2024-08-02 PROCEDURE — G8427 DOCREV CUR MEDS BY ELIG CLIN: HCPCS | Performed by: NURSE PRACTITIONER

## 2024-08-02 PROCEDURE — G8419 CALC BMI OUT NRM PARAM NOF/U: HCPCS | Performed by: NURSE PRACTITIONER

## 2024-08-02 PROCEDURE — 99213 OFFICE O/P EST LOW 20 MIN: CPT | Performed by: NURSE PRACTITIONER

## 2024-08-02 PROCEDURE — 1036F TOBACCO NON-USER: CPT | Performed by: NURSE PRACTITIONER

## 2024-08-02 RX ORDER — POLYMYXIN B SULFATE AND TRIMETHOPRIM 1; 10000 MG/ML; [USP'U]/ML
1 SOLUTION OPHTHALMIC EVERY 4 HOURS
Qty: 3 ML | Refills: 0 | Status: SHIPPED | OUTPATIENT
Start: 2024-08-02 | End: 2024-08-09

## 2024-08-02 RX ORDER — ERYTHROMYCIN 5 MG/G
0.5 OINTMENT OPHTHALMIC EVERY 6 HOURS
Qty: 3.5 G | Refills: 0 | Status: SHIPPED | OUTPATIENT
Start: 2024-08-02

## 2024-08-02 SDOH — ECONOMIC STABILITY: FOOD INSECURITY: WITHIN THE PAST 12 MONTHS, YOU WORRIED THAT YOUR FOOD WOULD RUN OUT BEFORE YOU GOT MONEY TO BUY MORE.: NEVER TRUE

## 2024-08-02 SDOH — ECONOMIC STABILITY: FOOD INSECURITY: WITHIN THE PAST 12 MONTHS, THE FOOD YOU BOUGHT JUST DIDN'T LAST AND YOU DIDN'T HAVE MONEY TO GET MORE.: NEVER TRUE

## 2024-08-02 SDOH — ECONOMIC STABILITY: INCOME INSECURITY: HOW HARD IS IT FOR YOU TO PAY FOR THE VERY BASICS LIKE FOOD, HOUSING, MEDICAL CARE, AND HEATING?: NOT HARD AT ALL

## 2024-08-02 ASSESSMENT — ENCOUNTER SYMPTOMS
VOMITING: 0
DIARRHEA: 0
COUGH: 0
ABDOMINAL PAIN: 0
WHEEZING: 0
CONSTIPATION: 0
NAUSEA: 0
EYE REDNESS: 1
SINUS PRESSURE: 0
EYE ITCHING: 0
SHORTNESS OF BREATH: 0
SORE THROAT: 0
EYE PAIN: 1
EYE DISCHARGE: 1
CHEST TIGHTNESS: 0
PHOTOPHOBIA: 0
RHINORRHEA: 1
TROUBLE SWALLOWING: 0

## 2024-08-02 NOTE — PROGRESS NOTES
Date of Visit:  2024  Patient Name: Roxana Koehler   Patient :  1985     CHIEF COMPLAINT:     Roxana Koehler is a 39 y.o. female who presents today for an general visit to be evaluated for the following condition(s):  Chief Complaint   Patient presents with    Eye Pain     Onset-Wednesday  Location- LT  Pain-7/10  Swelling-Y  Redness-y  Warm-y  Contacts/glasses-y Glasses  Changes to makeup-n  Vision changes-n   Treatment-n        HISTORY OF PRESENT ILLNESS     I reviewed staff HPI/chief complaint and do agree with above    Patient presents for concerns of left eyelid swelling (upper), redness, warmth, tenderness/pain.  States has been present for approximately 2 to 3 days.  Denies any noted injuries to the ocular region.  She does wear glasses, denies use of contact lenses.  States the first day it was present she did have some tearing like drainage otherwise denies any purulent drainage.  Denies any vision changes, loss of vision, diplopia, floaters or veils in vision.  Did have some upper respiratory symptoms recently but attributed these to allergies.  No fever/chills, or headaches.        REVIEW OF SYSTEM      Review of Systems   Constitutional:  Negative for appetite change, chills, fatigue and fever.   HENT:  Positive for congestion and rhinorrhea. Negative for ear pain, hearing loss, sinus pressure, sore throat and trouble swallowing.    Eyes:  Positive for pain, discharge and redness. Negative for photophobia, itching and visual disturbance.   Respiratory:  Negative for cough, chest tightness, shortness of breath and wheezing.    Cardiovascular:  Negative for chest pain and palpitations.   Gastrointestinal:  Negative for abdominal pain, constipation, diarrhea, nausea and vomiting.   Endocrine: Negative for cold intolerance and heat intolerance.   Musculoskeletal:  Negative for arthralgias and myalgias.   Skin:  Negative for rash.   Neurological:  Negative for dizziness, weakness,

## 2024-08-26 ENCOUNTER — OFFICE VISIT (OUTPATIENT)
Dept: FAMILY MEDICINE CLINIC | Age: 39
End: 2024-08-26
Payer: COMMERCIAL

## 2024-08-26 VITALS
OXYGEN SATURATION: 97 % | BODY MASS INDEX: 26.6 KG/M2 | HEIGHT: 64 IN | DIASTOLIC BLOOD PRESSURE: 74 MMHG | HEART RATE: 85 BPM | WEIGHT: 155.8 LBS | SYSTOLIC BLOOD PRESSURE: 116 MMHG

## 2024-08-26 DIAGNOSIS — B00.59 HSV (HERPES SIMPLEX VIRUS) INFECTION OF EYELID: Primary | ICD-10-CM

## 2024-08-26 PROCEDURE — G8427 DOCREV CUR MEDS BY ELIG CLIN: HCPCS | Performed by: NURSE PRACTITIONER

## 2024-08-26 PROCEDURE — 99213 OFFICE O/P EST LOW 20 MIN: CPT | Performed by: NURSE PRACTITIONER

## 2024-08-26 PROCEDURE — 1036F TOBACCO NON-USER: CPT | Performed by: NURSE PRACTITIONER

## 2024-08-26 PROCEDURE — G8419 CALC BMI OUT NRM PARAM NOF/U: HCPCS | Performed by: NURSE PRACTITIONER

## 2024-08-26 RX ORDER — POLYMYXIN B SULFATE AND TRIMETHOPRIM 1; 10000 MG/ML; [USP'U]/ML
SOLUTION OPHTHALMIC
COMMUNITY
Start: 2024-08-02

## 2024-08-26 RX ORDER — VALACYCLOVIR HYDROCHLORIDE 1 G/1
1000 TABLET, FILM COATED ORAL 3 TIMES DAILY
Qty: 21 TABLET | Refills: 0 | Status: SHIPPED | OUTPATIENT
Start: 2024-08-26 | End: 2024-09-02

## 2024-08-26 ASSESSMENT — ENCOUNTER SYMPTOMS
TROUBLE SWALLOWING: 0
DIARRHEA: 0
COUGH: 0
COLOR CHANGE: 1
SORE THROAT: 0
EYE PAIN: 0
WHEEZING: 0
CHEST TIGHTNESS: 0
RHINORRHEA: 0
NAUSEA: 0
VOMITING: 0
FACIAL SWELLING: 0
SHORTNESS OF BREATH: 0
ABDOMINAL PAIN: 0

## 2024-08-26 NOTE — PROGRESS NOTES
Subjective  Roxana Koehler, 39 y.o. female presents today with:  Chief Complaint   Patient presents with    Eye Pain     Onset- Friday   Location- [facility] eye   Pain- Y rates- 6/10   Redness- Y   Swelling- Y  Goopy/ crusty- Y  Vision changes- blurred   Contacts/ glasses- Pt does wear glasses   Change to makeup- N   Treatments- pt states she is using previous prescription eye drops        I reviewed staff HPI/chief complaint and do agree with above     Patient presents for concerns of left eyelid swelling (upper), redness, warmth, tenderness/pain.  States has been present for approximately 4 days.  She did have similar symptoms approximately 3 weeks ago and was treated with topical erythromycin ointment and Polytrim eyedrops and symptoms did resolve slowly.  Denies any noted injuries to the ocular region.  She does wear glasses, denies use of contact lenses.  She has noticed a rash in the area that did start out as small blisterlike areas and has since turned into scabbed ulcer-like areas to the left upper eyelid, prior to the development of the blisters in the area she did have some sinus type pains on the left side.  Denies any vision changes, loss of vision, diplopia, floaters or veils in vision.  No fever/chills, or headaches.            Review of Systems   Constitutional:  Negative for chills, diaphoresis, fatigue and fever.   HENT:  Negative for congestion, facial swelling, rhinorrhea, sore throat and trouble swallowing.    Eyes:  Negative for pain and visual disturbance.   Respiratory:  Negative for cough, chest tightness, shortness of breath and wheezing.    Cardiovascular:  Negative for chest pain and palpitations.   Gastrointestinal:  Negative for abdominal pain, diarrhea, nausea and vomiting.   Musculoskeletal:  Negative for arthralgias and myalgias.   Skin:  Positive for color change and rash (Left upper eyelid). Negative for wound.   Neurological:  Negative for dizziness, weakness and headaches.       Past    Cuff Size: Medium Adult   Pulse: 85   SpO2: 97%   Weight: 70.7 kg (155 lb 12.8 oz)   Height: 1.626 m (5' 4\")     BP Readings from Last 3 Encounters:   08/26/24 116/74   08/02/24 118/80   03/21/24 112/64     Wt Readings from Last 3 Encounters:   08/26/24 70.7 kg (155 lb 12.8 oz)   08/02/24 73.7 kg (162 lb 6.4 oz)   03/21/24 69.8 kg (153 lb 12.8 oz)     Physical Exam  Vitals reviewed.   Constitutional:       Appearance: Normal appearance.   HENT:      Nose: Nose normal.      Mouth/Throat:      Mouth: Mucous membranes are moist.      Pharynx: Oropharynx is clear.   Eyes:     Cardiovascular:      Rate and Rhythm: Normal rate and regular rhythm.      Pulses: Normal pulses.      Heart sounds: Normal heart sounds.   Pulmonary:      Effort: Pulmonary effort is normal.      Breath sounds: Normal breath sounds.   Musculoskeletal:         General: Normal range of motion.   Skin:     General: Skin is warm and dry.      Findings: Erythema and rash present. No bruising or lesion.   Neurological:      General: No focal deficit present.      Mental Status: She is alert and oriented to person, place, and time.   Psychiatric:         Mood and Affect: Mood normal.         Behavior: Behavior normal.       Assessment & Plan    Diagnosis Orders   1. 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  Discussed topical treatment with patient in room as well as supportive measures that can be utilized at home.  Follow-up if no improvement over the next 7 to 10 days or sooner symptoms are worsening.     No orders of the defined types were placed in this encounter.    Orders Placed This Encounter   Medications    valACYclovir (VALTREX) 1 g tablet     Sig: Take 1 tablet by mouth 3 times daily for 7 days     Dispense:  21 tablet     Refill:  0     There are no discontinued medications.  No follow-ups on file.      Reviewed with the patient: current clinical status, medications, activities and

## 2024-09-11 ENCOUNTER — TELEMEDICINE ON DEMAND (OUTPATIENT)
Age: 39
End: 2024-09-11
Payer: COMMERCIAL

## 2024-09-11 DIAGNOSIS — B00.1 COLD SORE: Primary | ICD-10-CM

## 2024-09-11 PROCEDURE — 99213 OFFICE O/P EST LOW 20 MIN: CPT | Performed by: NURSE PRACTITIONER

## 2024-09-11 PROCEDURE — 1036F TOBACCO NON-USER: CPT | Performed by: NURSE PRACTITIONER

## 2024-09-11 PROCEDURE — G8419 CALC BMI OUT NRM PARAM NOF/U: HCPCS | Performed by: NURSE PRACTITIONER

## 2024-09-11 PROCEDURE — G8427 DOCREV CUR MEDS BY ELIG CLIN: HCPCS | Performed by: NURSE PRACTITIONER

## 2024-09-11 RX ORDER — ACYCLOVIR 400 MG/1
400 TABLET ORAL 3 TIMES DAILY
Qty: 30 TABLET | Refills: 0 | Status: SHIPPED | OUTPATIENT
Start: 2024-09-11 | End: 2024-09-21

## 2024-09-24 RX ORDER — BUPROPION HYDROCHLORIDE 300 MG/1
300 TABLET ORAL EVERY MORNING
Qty: 30 TABLET | Refills: 11 | Status: SHIPPED | OUTPATIENT
Start: 2024-09-24

## 2024-10-16 ENCOUNTER — INITIAL PRENATAL (OUTPATIENT)
Dept: OBSTETRICS AND GYNECOLOGY | Facility: CLINIC | Age: 39
End: 2024-10-16

## 2024-10-16 ENCOUNTER — APPOINTMENT (OUTPATIENT)
Dept: OBSTETRICS AND GYNECOLOGY | Facility: CLINIC | Age: 39
End: 2024-10-16
Payer: COMMERCIAL

## 2024-10-16 VITALS
SYSTOLIC BLOOD PRESSURE: 118 MMHG | WEIGHT: 160.6 LBS | HEIGHT: 64 IN | BODY MASS INDEX: 27.42 KG/M2 | DIASTOLIC BLOOD PRESSURE: 78 MMHG

## 2024-10-16 DIAGNOSIS — Z3A.08 8 WEEKS GESTATION OF PREGNANCY (HHS-HCC): ICD-10-CM

## 2024-10-16 DIAGNOSIS — Z34.91 PRENATAL CARE IN FIRST TRIMESTER (HHS-HCC): Primary | ICD-10-CM

## 2024-10-16 LAB — PREGNANCY TEST URINE, POC: POSITIVE

## 2024-10-16 PROCEDURE — 87086 URINE CULTURE/COLONY COUNT: CPT

## 2024-10-16 PROCEDURE — 0500F INITIAL PRENATAL CARE VISIT: CPT

## 2024-10-16 PROCEDURE — 81025 URINE PREGNANCY TEST: CPT

## 2024-10-16 RX ORDER — BUPROPION HYDROCHLORIDE 300 MG/1
300 TABLET ORAL
COMMUNITY
Start: 2024-09-24 | End: 2024-10-16

## 2024-10-16 NOTE — PROGRESS NOTES
Subjective   Leonila Raymundo is a 39 y.o.  at Unknown with a working estimated date of delivery of Not found. who presents for an initial prenatal visit. This pregnancy is planned.  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     Preeclampsia risk:  History of hypertension:  No  ASA prophylaxis: patient has preeclampsia risk factors including Nulliparity, age > 35, and will start 162mg ASA daily at 12-16 weeks, RBA reviewed with patient who agrees.     Ob HX: none  PMH: seasonal allergies  Hx of metal health disorders:  denies  PSHX: lipoma removal  SH:  patient denies use of drugs, alcohol, or tobacco.   Pap HX: 2023 nml hpv-    Discussed compromised immune system when pregnant and recommended vaccinations. Strong recommendation and support for Covid & Flu vaccines discussed. Patient aware of increased rate of hospitalization, intubation and death with Covid & Flu in pregnancy - demonstrated safety of vaccinations during pregnancy with no associated increases in gestational diabetes, hypertensive disorders, miscarriage/ labor nor fetal anomalies reviewed. Patient aware vaccinations are recommended by ACNM, ACOG, SMFM and CDC. Patient declines flu and covid vaccines      OB History    Para Term  AB Living   1 0           SAB IAB Ectopic Multiple Live Births                  # Outcome Date GA Lbr Dillan/2nd Weight Sex Type Anes PTL Lv   1 Current                 Prior pregnancy complications:   Medical Problems        Objective   Weight: 72.8 kg (160 lb 9.6 oz)  TW.272 kg (9.6 oz)   Expected Total Weight Gain: 7 kg (15 lb)-11.5 kg (25 lb)   Pregravid BMI: 27.45  BP:  118/78  Problem List       No episode was linked to this visit.             Assessment /Plan     Genetic testing: The patient was counseled regarding prenatal genetic testing options and was provided literature in the obstetric folder. First line screening options, including cfDNA and first trimester ultrasound for nuchal translucency, were discussed and offered.  Benefits, drawbacks, and limitations were explained respectively.  It was clearly stated that only diagnostic testing via CVS or amniocentesis provides definitive information regarding a genetic diagnosis in the fetus (risk of complications with CVS 1/200, risk of fetal loss with CVS 1/400; risk of complications with amniocentesis 1/400 and a risk of fetal loss with amniocentesis 1/1000). It was discussed with the patient that the false positive rates for NIPT is higher for women under 35 years of age. It was encouraged that patient's with high risk personal/family history be referred to genetics for additional screening and counselling. This patient has decided to pursue carrier screening, NIPS, and NT scan.     Education provided r/t nutrition, prenatal vitamins, folic acid supplementation, dietary guidelines, exercise, smoking, alcohol, caffeine and drug use. Healthy weight gain in pregnancy discussed.     Physical activity -patient encouraged to be physically active throughout pregnancy to promote healthy weight gain.     Safety- patient informed not to clean litter boxes and that if she does gardening to wear gloves. Discussed with patient that she may use Tylenol as a pain reliever, but to avoid the use of NSAIDS. Patient informed of safe seat belt use, avoidance of hot tubs and saunas, and when to stop air travel.     Dental health- patient encouraged to maintain good dental health through pregnancy with routine dental screening.     Infant feeding- discussed patient's infant feeding preferences and strongly encouraged breastfeeding     Patient is  appropriate for midwifery care. Patient oriented to practice, collaborative practice model, and educated on visit frequency    Warning s/s discussed and SAB precautions reviewed.       NOB plan:   Urine culture today. GC CT at next visit.  PE at next visit.  Patient to schedule nurse visit for all new OB labs, nips, and carrier screening after 10 weeks gestation.   Patient to begin  mg daily beginning at 12 weeks  Patient to schedule NT scan/first trimester scan between 11 to 14 weeks gestation.  Patient to return to office in 4 weeks.  BETTYE Vigil-SERGEY

## 2024-10-18 LAB — BACTERIA UR CULT: NO GROWTH

## 2024-11-06 ENCOUNTER — PATIENT MESSAGE (OUTPATIENT)
Dept: OBSTETRICS AND GYNECOLOGY | Facility: CLINIC | Age: 39
End: 2024-11-06
Payer: COMMERCIAL

## 2024-11-07 ENCOUNTER — APPOINTMENT (OUTPATIENT)
Dept: OBSTETRICS AND GYNECOLOGY | Facility: CLINIC | Age: 39
End: 2024-11-07
Payer: COMMERCIAL

## 2024-11-07 DIAGNOSIS — Z34.91 PRENATAL CARE IN FIRST TRIMESTER (HHS-HCC): ICD-10-CM

## 2024-11-07 LAB
ABO GROUP (TYPE) IN BLOOD: NORMAL
ANTIBODY SCREEN: NORMAL
ERYTHROCYTE [DISTWIDTH] IN BLOOD BY AUTOMATED COUNT: 12 % (ref 11.5–14.5)
HBV SURFACE AG SERPL QL IA: NONREACTIVE
HCT VFR BLD AUTO: 37.2 % (ref 36–46)
HCV AB SER QL: NONREACTIVE
HGB BLD-MCNC: 12.3 G/DL (ref 12–16)
HIV 1+2 AB+HIV1 P24 AG SERPL QL IA: NONREACTIVE
MCH RBC QN AUTO: 29.9 PG (ref 26–34)
MCHC RBC AUTO-ENTMCNC: 33.1 G/DL (ref 32–36)
MCV RBC AUTO: 91 FL (ref 80–100)
NRBC BLD-RTO: 0 /100 WBCS (ref 0–0)
PLATELET # BLD AUTO: 163 X10*3/UL (ref 150–450)
RBC # BLD AUTO: 4.11 X10*6/UL (ref 4–5.2)
REFLEX ADDED, ANEMIA PANEL: NORMAL
RH FACTOR (ANTIGEN D): NORMAL
TREPONEMA PALLIDUM IGG+IGM AB [PRESENCE] IN SERUM OR PLASMA BY IMMUNOASSAY: NONREACTIVE
WBC # BLD AUTO: 5.3 X10*3/UL (ref 4.4–11.3)

## 2024-11-07 PROCEDURE — 83021 HEMOGLOBIN CHROMOTOGRAPHY: CPT

## 2024-11-07 PROCEDURE — 87389 HIV-1 AG W/HIV-1&-2 AB AG IA: CPT

## 2024-11-07 PROCEDURE — 87340 HEPATITIS B SURFACE AG IA: CPT

## 2024-11-07 PROCEDURE — 86850 RBC ANTIBODY SCREEN: CPT

## 2024-11-07 PROCEDURE — 86901 BLOOD TYPING SEROLOGIC RH(D): CPT

## 2024-11-07 PROCEDURE — 87491 CHLMYD TRACH DNA AMP PROBE: CPT

## 2024-11-07 PROCEDURE — 36415 COLL VENOUS BLD VENIPUNCTURE: CPT

## 2024-11-07 PROCEDURE — 85027 COMPLETE CBC AUTOMATED: CPT

## 2024-11-07 PROCEDURE — 86780 TREPONEMA PALLIDUM: CPT

## 2024-11-07 PROCEDURE — 87661 TRICHOMONAS VAGINALIS AMPLIF: CPT

## 2024-11-07 PROCEDURE — 83020 HEMOGLOBIN ELECTROPHORESIS: CPT | Performed by: ADVANCED PRACTICE MIDWIFE

## 2024-11-07 PROCEDURE — 86900 BLOOD TYPING SEROLOGIC ABO: CPT

## 2024-11-07 PROCEDURE — 99212 OFFICE O/P EST SF 10 MIN: CPT | Performed by: ADVANCED PRACTICE MIDWIFE

## 2024-11-07 PROCEDURE — 87591 N.GONORRHOEAE DNA AMP PROB: CPT

## 2024-11-07 PROCEDURE — 86803 HEPATITIS C AB TEST: CPT

## 2024-11-08 LAB
C TRACH RRNA SPEC QL NAA+PROBE: NEGATIVE
N GONORRHOEA DNA SPEC QL PROBE+SIG AMP: NEGATIVE
T VAGINALIS RRNA SPEC QL NAA+PROBE: NEGATIVE

## 2024-11-12 LAB
HEMOGLOBIN A2: 2.6 % (ref 2–3.5)
HEMOGLOBIN A: 96.9 % (ref 95.8–98)
HEMOGLOBIN F: 0.5 % (ref 0–2)
HEMOGLOBIN IDENTIFICATION INTERPRETATION: NORMAL
PATH REVIEW-HGB IDENTIFICATION: NORMAL

## 2024-11-15 ENCOUNTER — APPOINTMENT (OUTPATIENT)
Dept: OBSTETRICS AND GYNECOLOGY | Facility: CLINIC | Age: 39
End: 2024-11-15
Payer: COMMERCIAL

## 2024-11-15 VITALS — BODY MASS INDEX: 28.22 KG/M2 | WEIGHT: 164.4 LBS | DIASTOLIC BLOOD PRESSURE: 74 MMHG | SYSTOLIC BLOOD PRESSURE: 107 MMHG

## 2024-11-15 DIAGNOSIS — Z34.01 ENCOUNTER FOR SUPERVISION OF NORMAL FIRST PREGNANCY IN FIRST TRIMESTER: ICD-10-CM

## 2024-11-15 DIAGNOSIS — O09.529 ANTEPARTUM MULTIGRAVIDA OF ADVANCED MATERNAL AGE (HHS-HCC): ICD-10-CM

## 2024-11-15 DIAGNOSIS — Z3A.12 12 WEEKS GESTATION OF PREGNANCY (HHS-HCC): Primary | ICD-10-CM

## 2024-11-15 PROCEDURE — 87591 N.GONORRHOEAE DNA AMP PROB: CPT

## 2024-11-15 PROCEDURE — 87491 CHLMYD TRACH DNA AMP PROBE: CPT

## 2024-11-15 RX ORDER — ASPIRIN 81 MG/1
162 TABLET ORAL DAILY
Qty: 180 TABLET | Refills: 2 | Status: SHIPPED | OUTPATIENT
Start: 2024-11-15 | End: 2025-11-15

## 2024-11-15 NOTE — PROGRESS NOTES
Subjective   Leonila Raymundo is a 39 y.o.  at 12w4d with a working estimated date of delivery of 2025, by Last Menstrual Period who presents for a routine prenatal visit. She denies vaginal bleeding.    Patient reports overall feeling well.    All new OB labs reviewed and within normal limits.  Nips pending.    Her pregnancy is complicated by:  Medical Problems       Problem List       12 weeks gestation of pregnancy ([facility])    Overview Signed 10/16/2024  2:23 PM by JEANINE Vigil     Desired provider in labor: [x] CNM  [] Physician  [x] Blood Products: [x] Yes, accepts [] No, needs counseling  [x] Initial BMI: 27.45   [] Prenatal Labs:   [] Cervical Cancer Screening up to date: 23 nml, hpv neg  [] Rh status:   [] Genetic Screening:    [] NT US: (11-13 wks)  [x] Baby ASA (if indicated): age and nulliparity  [x] Pregnancy dated by: regular LMP    [] Anatomy US: (19-20 wks)  [] Federal Sterilization consent signed (if indicated):  [] 1hr GCT at 24-28wks:  [] Rhogam (if indicated):   [] Fetal Surveillance (if indicated):  [] Tdap (27-32 wks, may be given up to 36 wks if initial window missed):   [] RSV (32-36 wks) (Sept. to end of ):   [x] Flu Vaccine: declines    [] Breastfeeding:  [] Postpartum Birth control method:   [] GBS at 36 - 37 wks:  [] 39 weeks discussion of IOL vs. Expectant management:  [] Mode of delivery ( anticipated ):                Objective   Physical Exam  Weight: 74.6 kg (164 lb 6.4 oz), Pregravid BMI: 27.45  TW.996 kg (4 lb 6.4 oz)   Expected Total Weight Gain: 7 kg (15 lb)-11.5 kg (25 lb)   BP: 107/74           Lab Results   Component Value Date    HGB 12.3 2024    HCT 37.2 2024     2024    ABO A 2024    LABRH POS 2024    NEISSGONOAMP Negative 2024    CHLAMTRACAMP Negative 2024    SYPHT Nonreactive 2024    HEPBSAG Nonreactive 2024    HIV1X2 Nonreactive 2024    URINECULTURE No growth 10/16/2024      NIPT: Pending      Assessment/Plan   PE today  GC/CT today  Prescription for ASA sent to patient's pharmacy and patient instructed to begin taking 162 mg daily.  Review NT scan  Reviewed warning signs and when to call provider  Follow up in 4 weeks for a routine prenatal visit.    JEANINE Vigil

## 2024-11-16 LAB
C TRACH RRNA SPEC QL NAA+PROBE: NEGATIVE
N GONORRHOEA DNA SPEC QL PROBE+SIG AMP: NEGATIVE

## 2024-11-20 ENCOUNTER — HOSPITAL ENCOUNTER (OUTPATIENT)
Dept: RADIOLOGY | Facility: CLINIC | Age: 39
Discharge: HOME | End: 2024-11-20
Payer: COMMERCIAL

## 2024-11-20 DIAGNOSIS — Z34.91 PRENATAL CARE IN FIRST TRIMESTER (HHS-HCC): ICD-10-CM

## 2024-11-20 PROCEDURE — 76801 OB US < 14 WKS SINGLE FETUS: CPT

## 2024-11-28 LAB — COMMENTS - MP RESULT TYPE: NORMAL

## 2024-12-10 ENCOUNTER — APPOINTMENT (OUTPATIENT)
Dept: OBSTETRICS AND GYNECOLOGY | Facility: CLINIC | Age: 39
End: 2024-12-10
Payer: COMMERCIAL

## 2024-12-10 VITALS — WEIGHT: 160.8 LBS | BODY MASS INDEX: 27.6 KG/M2 | SYSTOLIC BLOOD PRESSURE: 105 MMHG | DIASTOLIC BLOOD PRESSURE: 66 MMHG

## 2024-12-10 DIAGNOSIS — O09.522 AMA (ADVANCED MATERNAL AGE) MULTIGRAVIDA 35+, SECOND TRIMESTER (HHS-HCC): ICD-10-CM

## 2024-12-10 DIAGNOSIS — Z3A.16 16 WEEKS GESTATION OF PREGNANCY (HHS-HCC): Primary | ICD-10-CM

## 2024-12-10 PROCEDURE — 0501F PRENATAL FLOW SHEET: CPT

## 2024-12-10 NOTE — PROGRESS NOTES
Subjective     Leonila Raymundo is a 39 y.o.  at 16w1d with a working estimated date of delivery of 2025, by Last Menstrual Period who presents for a routine prenatal visit. She denies vaginal bleeding, leakage of fluid, or contractions. Patient reports not yet fetal movement and compliance with PNV and ASA.     First trimester anatomy scan reviewed and within normal limits.    Current Outpatient Medications on File Prior to Visit   Medication Sig Dispense Refill    aspirin 81 mg EC tablet Take 2 tablets (162 mg) by mouth once daily. For prevention of hypertensive disorders of pregnancy 180 tablet 2    prenatal no115/iron/folic acid (PRENATAL 19 ORAL) Take 1 tablet by mouth once daily.       No current facility-administered medications on file prior to visit.        Her pregnancy is complicated by:  Medical Problems       Problem List       16 weeks gestation of pregnancy ([facility])    Overview Addendum 2024  5:45 PM by JEANINE Vigil     Desired provider in labor: [x] CNM  [] Physician  [x] Blood Products: [x] Yes, accepts [] No, needs counseling  [x] Initial BMI: 27.45   [] Prenatal Labs:   [] Cervical Cancer Screening up to date: 23 nml, hpv neg  [] Rh status:   [x] Genetic Screening:  rr  [x] NT US: (11-13 wks): WNL  [x] Baby ASA (if indicated): age and nulliparity  [x] Pregnancy dated by: regular LMP    [] Anatomy US: (19-20 wks)  [] Federal Sterilization consent signed (if indicated):  [] 1hr GCT at 24-28wks:  [] Rhogam (if indicated):   [] Fetal Surveillance (if indicated):  [] Tdap (27-32 wks, may be given up to 36 wks if initial window missed):   [] RSV (32-36 wks) (Sept. to end of ):   [x] Flu Vaccine: declines    [] Breastfeeding:  [] Postpartum Birth control method:   [] GBS at 36 - 37 wks:  [] 39 weeks discussion of IOL vs. Expectant management:  [] Mode of delivery ( anticipated ):                 Objective      Expected Total Weight Gain: 7 kg (15 lb)-11.5 kg (25 lb)    TW.996 kg (4 lb 6.4 oz)  Pregravid BMI: 27.45                 Prenatal Labs:  Lab Results   Component Value Date    HGB 12.3 2024    HCT 37.2 2024     2024    ABO A 2024    LABRH POS 2024    NEISSGONOAMP Negative 11/15/2024    CHLAMTRACAMP Negative 11/15/2024    SYPHT Nonreactive 2024    HEPBSAG Nonreactive 2024    HIV1X2 Nonreactive 2024    URINECULTURE No growth 10/16/2024       Assessment/Plan   Second trimester anticipatory guidance provided  Follow up in 4 weeks for a routine prenatal visit.    JEANINE Vigil

## 2025-01-10 ENCOUNTER — APPOINTMENT (OUTPATIENT)
Dept: OBSTETRICS AND GYNECOLOGY | Facility: CLINIC | Age: 40
End: 2025-01-10
Payer: COMMERCIAL

## 2025-01-10 VITALS — SYSTOLIC BLOOD PRESSURE: 113 MMHG | BODY MASS INDEX: 28.49 KG/M2 | DIASTOLIC BLOOD PRESSURE: 73 MMHG | WEIGHT: 166 LBS

## 2025-01-10 DIAGNOSIS — Z3A.20 20 WEEKS GESTATION OF PREGNANCY (HHS-HCC): Primary | ICD-10-CM

## 2025-01-10 DIAGNOSIS — O09.522 MULTIGRAVIDA OF ADVANCED MATERNAL AGE IN SECOND TRIMESTER (HHS-HCC): ICD-10-CM

## 2025-01-10 NOTE — PROGRESS NOTES
Subjective     Leonila Raymundo is a 39 y.o.  at 20w4d with a working estimated date of delivery of 2025, by Last Menstrual Period who presents for a routine prenatal visit. She denies vaginal bleeding, leakage of fluid, or contractions. Patient reports not yet feeling fetal movement and compliance with PNV and ASA.      Current Outpatient Medications on File Prior to Visit   Medication Sig Dispense Refill    aspirin 81 mg EC tablet Take 2 tablets (162 mg) by mouth once daily. For prevention of hypertensive disorders of pregnancy 180 tablet 2    prenatal no115/iron/folic acid (PRENATAL 19 ORAL) Take 1 tablet by mouth once daily.       No current facility-administered medications on file prior to visit.        Her pregnancy is complicated by:  Medical Problems       Problem List       20 weeks gestation of pregnancy ([facility])    Overview Addendum 2024  5:45 PM by JEANINE Vigil     Desired provider in labor: [x] CNM  [] Physician  [x] Blood Products: [x] Yes, accepts [] No, needs counseling  [x] Initial BMI: 27.45   [] Prenatal Labs:   [] Cervical Cancer Screening up to date: 23 nml, hpv neg  [] Rh status:   [x] Genetic Screening:  rr  [x] NT US: (11-13 wks): WNL  [x] Baby ASA (if indicated): age and nulliparity  [x] Pregnancy dated by: regular LMP    [] Anatomy US: (19-20 wks)  [] Federal Sterilization consent signed (if indicated):  [] 1hr GCT at 24-28wks:  [] Rhogam (if indicated):   [] Fetal Surveillance (if indicated):  [] Tdap (27-32 wks, may be given up to 36 wks if initial window missed):   [] RSV (32-36 wks) (Sept. to end of ):   [x] Flu Vaccine: declines    [] Breastfeeding:  [] Postpartum Birth control method:   [] GBS at 36 - 37 wks:  [] 39 weeks discussion of IOL vs. Expectant management:  [] Mode of delivery ( anticipated ):                 Objective   Weight: 75.3 kg (166 lb)  Expected Total Weight Gain: 7 kg (15 lb)-11.5 kg (25 lb)   TW.722 kg (6 lb)  Pregravid  BMI: 27.45      BP: 113/73          Prenatal Labs:  Lab Results   Component Value Date    HGB 12.3 11/07/2024    HCT 37.2 11/07/2024     11/07/2024    ABO A 11/07/2024    LABRH POS 11/07/2024    NEISSGONOAMP Negative 11/15/2024    CHLAMTRACAMP Negative 11/15/2024    SYPHT Nonreactive 11/07/2024    HEPBSAG Nonreactive 11/07/2024    HIV1X2 Nonreactive 11/07/2024    URINECULTURE No growth 10/16/2024       Assessment/Plan   Anticipatory guidance given in regards to fetal movement  Second [place] education [place] through [place]  Review anatomy scan  Follow up in 4 weeks for a routine prenatal visit.    JEANINE Vigil

## 2025-01-14 ENCOUNTER — HOSPITAL ENCOUNTER (OUTPATIENT)
Dept: RADIOLOGY | Facility: CLINIC | Age: 40
Discharge: HOME | End: 2025-01-14
Payer: COMMERCIAL

## 2025-01-14 DIAGNOSIS — Z34.91 PRENATAL CARE IN FIRST TRIMESTER (HHS-HCC): ICD-10-CM

## 2025-01-14 PROCEDURE — 76811 OB US DETAILED SNGL FETUS: CPT | Performed by: MEDICAL GENETICS

## 2025-01-14 PROCEDURE — 76811 OB US DETAILED SNGL FETUS: CPT

## 2025-02-07 ENCOUNTER — APPOINTMENT (OUTPATIENT)
Dept: OBSTETRICS AND GYNECOLOGY | Facility: CLINIC | Age: 40
End: 2025-02-07
Payer: COMMERCIAL

## 2025-02-07 VITALS — WEIGHT: 170.8 LBS | BODY MASS INDEX: 29.32 KG/M2 | SYSTOLIC BLOOD PRESSURE: 114 MMHG | DIASTOLIC BLOOD PRESSURE: 79 MMHG

## 2025-02-07 DIAGNOSIS — O09.522 AMA (ADVANCED MATERNAL AGE) MULTIGRAVIDA 35+, SECOND TRIMESTER (HHS-HCC): ICD-10-CM

## 2025-02-07 DIAGNOSIS — Z3A.24 24 WEEKS GESTATION OF PREGNANCY (HHS-HCC): Primary | ICD-10-CM

## 2025-02-07 DIAGNOSIS — Z34.02 ENCOUNTER FOR SUPERVISION OF NORMAL FIRST PREGNANCY IN SECOND TRIMESTER: ICD-10-CM

## 2025-02-07 NOTE — PROGRESS NOTES
Subjective     Leonila Raymundo is a 39 y.o.  at 24w4d with a working estimated date of delivery of 2025, by Last Menstrual Period who presents for a routine prenatal visit. She denies vaginal bleeding, leakage of fluid, or contractions. Patient reports feeling fetal movement and compliance with medications.     Anatomy scan reviewed and wnl. Patient for growth scan at 30 weeks.     Patient aware of need to complete CBC and one hour with next visit. BLOOD TYPE A+.  Patient educated that she does not have to fast for the one hour glucose, however she should avoid a carb heavy meal prior to the test.    Current Outpatient Medications on File Prior to Visit   Medication Sig Dispense Refill    aspirin 81 mg EC tablet Take 2 tablets (162 mg) by mouth once daily. For prevention of hypertensive disorders of pregnancy 180 tablet 2    prenatal no115/iron/folic acid (PRENATAL 19 ORAL) Take 1 tablet by mouth once daily.       No current facility-administered medications on file prior to visit.        Her pregnancy is complicated by:  Medical Problems      Medical Problems       Problem List       24 weeks gestation of pregnancy ([facility])    Overview Addendum 2025  3:25 PM by BETTYE Vigil-SERGEY     Desired provider in labor: [x] CNM  [] Physician  [x] Blood Products: [x] Yes, accepts [] No, needs counseling  [x] Initial BMI: 27.45   [x] Prenatal Labs:   [x] Cervical Cancer Screening up to date: 23 nml, hpv neg  [x] Rh status: +  [x] Genetic Screening:  rr  [x] NT US: (11-13 wks): WNL  [x] Baby ASA (if indicated): age and nulliparity  [x] Pregnancy dated by: regular LMP    [x] Anatomy US: (19-20 wks): growth at 30 weeks  [] Federal Sterilization consent signed (if indicated):  [] 1hr GCT at 24-28wks:  [x] Rhogam (if indicated): n/a  [] Fetal Surveillance (if indicated):  [] Tdap (27-32 wks, may be given up to 36 wks if initial window missed):   [] RSV (32-36 wks) (Sept. to end of ):   [x] Flu  Vaccine: declines    [] Breastfeeding:  [] Postpartum Birth control method:   [] GBS at 36 - 37 wks:  [] 39 weeks discussion of IOL vs. Expectant management:  [] Mode of delivery ( anticipated ):                  Objective   Weight: 77.5 kg (170 lb 12.8 oz)  Expected Total Weight Gain: 7 kg (15 lb)-11.5 kg (25 lb)   TW.899 kg (10 lb 12.8 oz)  Pregravid BMI: 27.45      BP: 114/79          Prenatal Labs:  Lab Results   Component Value Date    HGB 12.3 2024    HCT 37.2 2024     2024    ABO A 2024    LABRH POS 2024    NEISSGONOAMP Negative 11/15/2024    CHLAMTRACAMP Negative 11/15/2024    SYPHT Nonreactive 2024    HEPBSAG Nonreactive 2024    HIV1X2 Nonreactive 2024    URINECULTURE No growth 10/16/2024       Assessment/Plan   Patient to have one hour and cbc completed prior to next visit.  28 week folder at next visit.   Weight reviewed, up 4# since last visit, TWG 10#  Tdap at next visit  Follow up in 4 weeks for a routine prenatal visit.    JEANINE Vigil

## 2025-03-07 ENCOUNTER — LAB (OUTPATIENT)
Dept: LAB | Facility: HOSPITAL | Age: 40
End: 2025-03-07
Payer: COMMERCIAL

## 2025-03-07 ENCOUNTER — APPOINTMENT (OUTPATIENT)
Dept: OBSTETRICS AND GYNECOLOGY | Facility: CLINIC | Age: 40
End: 2025-03-07
Payer: COMMERCIAL

## 2025-03-07 VITALS — WEIGHT: 174.6 LBS | SYSTOLIC BLOOD PRESSURE: 114 MMHG | BODY MASS INDEX: 29.97 KG/M2 | DIASTOLIC BLOOD PRESSURE: 73 MMHG

## 2025-03-07 DIAGNOSIS — Z3A.28 28 WEEKS GESTATION OF PREGNANCY (HHS-HCC): Primary | ICD-10-CM

## 2025-03-07 DIAGNOSIS — Z71.85 IMMUNIZATION COUNSELING: ICD-10-CM

## 2025-03-07 DIAGNOSIS — O09.513 AMA (ADVANCED MATERNAL AGE) PRIMIGRAVIDA 35+, THIRD TRIMESTER (HHS-HCC): ICD-10-CM

## 2025-03-07 DIAGNOSIS — K21.9 GASTROESOPHAGEAL REFLUX DISEASE, UNSPECIFIED WHETHER ESOPHAGITIS PRESENT: ICD-10-CM

## 2025-03-07 LAB
ERYTHROCYTE [DISTWIDTH] IN BLOOD BY AUTOMATED COUNT: 12.7 % (ref 11.5–14.5)
HCT VFR BLD AUTO: 33.2 % (ref 36–46)
HGB BLD-MCNC: 11.2 G/DL (ref 12–16)
MCH RBC QN AUTO: 30.7 PG (ref 26–34)
MCHC RBC AUTO-ENTMCNC: 33.7 G/DL (ref 32–36)
MCV RBC AUTO: 91 FL (ref 80–100)
NRBC BLD-RTO: 0 /100 WBCS (ref 0–0)
PLATELET # BLD AUTO: 147 X10*3/UL (ref 150–450)
RBC # BLD AUTO: 3.65 X10*6/UL (ref 4–5.2)
REFLEX ADDED, ANEMIA PANEL: NORMAL
WBC # BLD AUTO: 7.3 X10*3/UL (ref 4.4–11.3)

## 2025-03-07 PROCEDURE — 85027 COMPLETE CBC AUTOMATED: CPT

## 2025-03-07 RX ORDER — CALC/MAG/B COMPLEX/D3/HERB 61
15 TABLET ORAL
Qty: 30 CAPSULE | Refills: 4 | Status: SHIPPED | OUTPATIENT
Start: 2025-03-07 | End: 2026-03-07

## 2025-03-07 ASSESSMENT — EDINBURGH POSTNATAL DEPRESSION SCALE (EPDS)
I HAVE FELT SAD OR MISERABLE: NO, NOT AT ALL
THE THOUGHT OF HARMING MYSELF HAS OCCURRED TO ME: NEVER
THINGS HAVE BEEN GETTING ON TOP OF ME: NO, MOST OF THE TIME I HAVE COPED QUITE WELL
I HAVE BEEN SO UNHAPPY THAT I HAVE HAD DIFFICULTY SLEEPING: NOT AT ALL
I HAVE BEEN ABLE TO LAUGH AND SEE THE FUNNY SIDE OF THINGS: AS MUCH AS I ALWAYS COULD
I HAVE FELT SCARED OR PANICKY FOR NO GOOD REASON: NO, NOT MUCH
I HAVE BEEN ANXIOUS OR WORRIED FOR NO GOOD REASON: YES, SOMETIMES
I HAVE BLAMED MYSELF UNNECESSARILY WHEN THINGS WENT WRONG: NO, NEVER
I HAVE LOOKED FORWARD WITH ENJOYMENT TO THINGS: AS MUCH AS I EVER DID
TOTAL SCORE: 4
I HAVE BEEN SO UNHAPPY THAT I HAVE BEEN CRYING: NO, NEVER

## 2025-03-07 NOTE — PROGRESS NOTES
Subjective     Leonila Raymundo is a 39 y.o.  at 28w4d with a working estimated date of delivery of 2025, by Last Menstrual Period who presents for a routine prenatal visit. She denies vaginal bleeding, leakage of fluid, decreased fetal movements, or contractions.    Patient reporting continued heartburn despite Tagamet and Tums usage regularly.  Patient reports she was previously on Prevacid prior to pregnancy which did relieve symptoms.    Patient has growth scan scheduled for 30 weeks gestation though patient concerned as to why growth scan was recommended.  Patient would prefer not to pursue growth scan due to cost.  Anatomy scan within normal limits.  This provider will reach out to Dr. Delgadillo to determine reasoning for recommended growth scan.    Encouraged and discussed with patient scheduling childbirth education and breast feeding classes as well as participating in self study.      Leonila Raymundo was counseled on the recommendation for and benefits of TDaP vaccination during pregnancy.  We discussed the passive immunity that occurs after maternal vaccination during pregnancy, and the antibodies that cross the placenta to the fetus to protect baby in the first few months of life.  Babies do not receive their first vaccination for pertussis/whooping cough until 2 months of life, during which the baby is vulnerable to this bacterial infection.  Whooping cough can cause severe and even life-threatening infections, and maternal vaccination between 27 and 36 weeks of pregnancy is the best method to protect baby from Whooping cough until they are eligible for the vaccination. After discussion the patient is undecided on the vaccine. >5 minutes were spent on counseling related to vaccine recommendations and safety.     One hour and CBC drawn today and results pending.    Her pregnancy is complicated by:  Medical Problems       Problem List       28 weeks gestation of pregnancy ([facility])    Overview  Addendum 2025  3:25 PM by JEANINE Vigli     Desired provider in labor: [x] CNM  [] Physician  [x] Blood Products: [x] Yes, accepts [] No, needs counseling  [x] Initial BMI: 27.45   [x] Prenatal Labs:   [x] Cervical Cancer Screening up to date: 23 nml, hpv neg  [x] Rh status: +  [x] Genetic Screening:  rr  [x] NT US: (11-13 wks): WNL  [x] Baby ASA (if indicated): age and nulliparity  [x] Pregnancy dated by: regular LMP    [x] Anatomy US: (19-20 wks): growth at 30 weeks  [] Federal Sterilization consent signed (if indicated):  [] 1hr GCT at 24-28wks:  [x] Rhogam (if indicated): n/a  [] Fetal Surveillance (if indicated):  [] Tdap (27-32 wks, may be given up to 36 wks if initial window missed):   [] RSV (32-36 wks) (Sept. to end of ):   [x] Flu Vaccine: declines    [] Breastfeeding:  [] Postpartum Birth control method:   [] GBS at 36 - 37 wks:  [] 39 weeks discussion of IOL vs. Expectant management:  [] Mode of delivery ( anticipated ):                Objective   Weight: 79.2 kg (174 lb 9.6 oz)  TW.623 kg (14 lb 9.6 oz)  Pregravid BMI: 27.45    BP: 114/73          Prenatal Labs:  Lab Results   Component Value Date    HGB 12.3 2024    HCT 37.2 2024     2024    ABO A 2024    LABRH POS 2024    NEISSGONOAMP Negative 11/15/2024    CHLAMTRACAMP Negative 11/15/2024    SYPHT Nonreactive 2024    HEPBSAG Nonreactive 2024    HIV1X2 Nonreactive 2024    URINECULTURE No growth 10/16/2024       Assessment/Plan   Tdap?  Review 1 hour and CBC result  GERD: Prescription for Prevacid sent to patient's pharmacy  Third trimester education sent through my chart.   This patient to reach out to  to determine reasoning for recommendation growth scan in the setting of normal anatomy scan  Follow up in 2 weeks for a routine prenatal visit.    JEANINE Vigil

## 2025-03-08 LAB — GLUCOSE 1H P 50 G GLC PO SERPL-MCNC: 104 MG/DL

## 2025-03-18 ENCOUNTER — APPOINTMENT (OUTPATIENT)
Dept: RADIOLOGY | Facility: CLINIC | Age: 40
End: 2025-03-18
Payer: COMMERCIAL

## 2025-03-18 ENCOUNTER — APPOINTMENT (OUTPATIENT)
Dept: OBSTETRICS AND GYNECOLOGY | Facility: CLINIC | Age: 40
End: 2025-03-18
Payer: COMMERCIAL

## 2025-03-18 VITALS — WEIGHT: 173.8 LBS | DIASTOLIC BLOOD PRESSURE: 72 MMHG | SYSTOLIC BLOOD PRESSURE: 108 MMHG | BODY MASS INDEX: 29.83 KG/M2

## 2025-03-18 DIAGNOSIS — O09.513 PRIMIGRAVIDA OF ADVANCED MATERNAL AGE IN THIRD TRIMESTER (HHS-HCC): ICD-10-CM

## 2025-03-18 DIAGNOSIS — Z3A.30 30 WEEKS GESTATION OF PREGNANCY (HHS-HCC): Primary | ICD-10-CM

## 2025-03-18 DIAGNOSIS — Z23 NEED FOR VACCINATION: ICD-10-CM

## 2025-03-18 PROCEDURE — 90715 TDAP VACCINE 7 YRS/> IM: CPT

## 2025-03-18 PROCEDURE — 0501F PRENATAL FLOW SHEET: CPT

## 2025-03-18 PROCEDURE — 90471 IMMUNIZATION ADMIN: CPT

## 2025-03-18 NOTE — PROGRESS NOTES
Subjective     Leonila Raymundo is a 39 y.o.  at 30w1d with a working estimated date of delivery of 2025, by Last Menstrual Period who presents for a routine prenatal visit. She denies vaginal bleeding, leakage of fluid, decreased fetal movements, or contractions. Reports compliance with PNV and ASA. Patient reports improvement of heartburn with the use of Prevacid.     Patient canceled precautionary growth scan.     Encouraged and discussed with patient scheduling childbirth education and breast feeding classes as well as participating in self study.     Leonila Raymundo was counseled on the recommendation for and benefits of TDaP vaccination during pregnancy.  We discussed the passive immunity that occurs after maternal vaccination during pregnancy, and the antibodies that cross the placenta to the fetus to protect baby in the first few months of life.  Babies do not receive their first vaccination for pertussis/whooping cough until 2 months of life, during which the baby is vulnerable to this bacterial infection.  Whooping cough can cause severe and even life-threatening infections, and maternal vaccination between 27 and 36 weeks of pregnancy is the best method to protect baby from Whooping cough until they are eligible for the vaccination. After discussion the patient agreeable to the vaccine. >5 minutes were spent on counseling related to vaccine recommendations and safety      Her pregnancy is complicated by:  Medical Problems       Problem List       28 weeks gestation of pregnancy ([facility])    Overview Addendum 2025  3:25 PM by JEANINE Vigil     Desired provider in labor: [x] CNM  [] Physician  [x] Blood Products: [x] Yes, accepts [] No, needs counseling  [x] Initial BMI: 27.45   [x] Prenatal Labs:   [x] Cervical Cancer Screening up to date: 23 nml, hpv neg  [x] Rh status: +  [x] Genetic Screening:  rr  [x] NT US: (11-13 wks): WNL  [x] Baby ASA (if indicated): age and  nulliparity  [x] Pregnancy dated by: regular LMP    [x] Anatomy US: (19-20 wks): growth at 30 weeks  [] Federal Sterilization consent signed (if indicated):  [x] 1hr GCT at 24-28wks: 104  [x] Rhogam (if indicated): n/a  [] Fetal Surveillance (if indicated):  [x] Tdap (27-32 wks, may be given up to 36 wks if initial window missed): 3/18/25   [] RSV (32-36 wks) (Sept. to end ):   [x] Flu Vaccine: declines    [] Breastfeeding:  [] Postpartum Birth control method:   [] GBS at 36 - 37 wks:  [] 39 weeks discussion of IOL vs. Expectant management:  [] Mode of delivery ( anticipated ):                Objective   Weight: 78.8 kg (173 lb 12.8 oz)  TW.26 kg (13 lb 12.8 oz)  Pregravid BMI: 27.45    BP: 108/72  Fetal Heart Rate: 140 Fundal Height (cm): 30 cm     Prenatal Labs:  Lab Results   Component Value Date    HGB 11.2 (L) 2025    HCT 33.2 (L) 2025     (L) 2025    ABO A 2024    LABRH POS 2024    NEISSGONOAMP Negative 11/15/2024    CHLAMTRACAMP Negative 11/15/2024    SYPHT Nonreactive 2024    HEPBSAG Nonreactive 2024    HIV1X2 Nonreactive 2024    URINECULTURE No growth 10/16/2024       Assessment/Plan   One hour and CBC reviewed and wnl  Discuss birth preferences at next visit  Tdap today  Reviewed s/sx of PTL, warning signs, fetal movement counts, and when to call provider  Follow up in 2 week for a routine prenatal visit.    Vale Armijo RN     I saw and evaluated the patient. I personally obtained the key and critical portions of the history and physical exam or was physically present for key and critical portions performed by the student. I reviewed the student's documentation and discussed the patient with the student. I agree with the student's medical decision making as documented in the note.     JEANINE Vigil

## 2025-04-04 ENCOUNTER — APPOINTMENT (OUTPATIENT)
Dept: OBSTETRICS AND GYNECOLOGY | Facility: CLINIC | Age: 40
End: 2025-04-04
Payer: COMMERCIAL

## 2025-04-04 VITALS — DIASTOLIC BLOOD PRESSURE: 76 MMHG | SYSTOLIC BLOOD PRESSURE: 112 MMHG | BODY MASS INDEX: 30.28 KG/M2 | WEIGHT: 176.4 LBS

## 2025-04-04 DIAGNOSIS — Z34.02 ENCOUNTER FOR SUPERVISION OF NORMAL FIRST PREGNANCY IN SECOND TRIMESTER: ICD-10-CM

## 2025-04-04 DIAGNOSIS — Z3A.32 32 WEEKS GESTATION OF PREGNANCY (HHS-HCC): Primary | ICD-10-CM

## 2025-04-04 PROCEDURE — 0501F PRENATAL FLOW SHEET: CPT

## 2025-04-04 NOTE — PROGRESS NOTES
Subjective     Leonila Raymundo is a 39 y.o.  at 32w4d with a working estimated date of delivery of 2025, by Last Menstrual Period who presents for a routine prenatal visit. She denies vaginal bleeding, leakage of fluid, decreased fetal movements, or contractions.    Birth preferences reviewed and placed in overview and plan    Her pregnancy is complicated by:  Medical Problems       Problem List       32 weeks gestation of pregnancy ([facility])    Overview Addendum 3/18/2025  3:21 PM by Vale Armijo RN     Desired provider in labor: [x] CNM  [] Physician  [x] Blood Products: [x] Yes, accepts [] No, needs counseling  [x] Initial BMI: 27.45   [x] Prenatal Labs:   [x] Cervical Cancer Screening up to date: 23 nml, hpv neg  [x] Rh status: +  [x] Genetic Screening:  rr  [x] NT US: (11-13 wks): WNL  [x] Baby ASA (if indicated): age and nulliparity  [x] Pregnancy dated by: regular LMP    [x] Anatomy US: (19-20 wks): growth at 30 weeks  [] Federal Sterilization consent signed (if indicated):  [x] 1hr GCT at 24-28wks: 104  [x] Rhogam (if indicated): n/a  [] Fetal Surveillance (if indicated):  [x] Tdap (27-32 wks, may be given up to 36 wks if initial window missed): 3/18/25  [x] Flu Vaccine: declines    [] Breastfeeding:  [] Postpartum Birth control method:   [] GBS at 36 - 37 wks:  [] 39 weeks discussion of IOL vs. Expectant management:  [] Mode of delivery ( anticipated ):          Primigravida of advanced maternal age in third trimester ([facility])          Objective   Weight: 80 kg (176 lb 6.4 oz)  TW.439 kg (16 lb 6.4 oz)  Pregravid BMI: 27.45    BP: 112/76          Prenatal Labs:  Lab Results   Component Value Date    HGB 11.2 (L) 2025    HCT 33.2 (L) 2025     (L) 2025    ABO A 2024    LABRH POS 2024    NEISSGONOAMP Negative 11/15/2024    CHLAMTRACAMP Negative 11/15/2024    SYPHT Nonreactive 2024    HEPBSAG Nonreactive 2024    HIV1X2 Nonreactive  11/07/2024    URINECULTURE No growth 10/16/2024       Assessment/Plan   Third trimester and breastfeeding education sent through [employer]  Reviewed s/sx of PTL, warning signs, fetal movement counts, and when to call provider  Follow up in 2 weeks for a routine prenatal visit.    JEANINE Vigil

## 2025-04-15 ENCOUNTER — APPOINTMENT (OUTPATIENT)
Dept: OBSTETRICS AND GYNECOLOGY | Facility: CLINIC | Age: 40
End: 2025-04-15
Payer: COMMERCIAL

## 2025-04-15 VITALS — DIASTOLIC BLOOD PRESSURE: 80 MMHG | WEIGHT: 178 LBS | SYSTOLIC BLOOD PRESSURE: 122 MMHG | BODY MASS INDEX: 30.55 KG/M2

## 2025-04-15 DIAGNOSIS — Z34.03 ENCOUNTER FOR SUPERVISION OF NORMAL FIRST PREGNANCY IN THIRD TRIMESTER: ICD-10-CM

## 2025-04-15 DIAGNOSIS — O09.523 MULTIGRAVIDA OF ADVANCED MATERNAL AGE IN THIRD TRIMESTER (HHS-HCC): ICD-10-CM

## 2025-04-15 DIAGNOSIS — Z3A.34 34 WEEKS GESTATION OF PREGNANCY (HHS-HCC): Primary | ICD-10-CM

## 2025-04-15 PROCEDURE — 0501F PRENATAL FLOW SHEET: CPT

## 2025-04-15 NOTE — PROGRESS NOTES
Subjective     Leonila Raymundo is a 39 y.o.  at 34w1d with a working estimated date of delivery of 2025, by Last Menstrual Period who presents for a routine prenatal visit. She denies vaginal bleeding, leakage of fluid, decreased fetal movements, or contractions.    Her pregnancy is complicated by:  Medical Problems       Problem List       34 weeks gestation of pregnancy ([facility])    Overview Addendum 2025  3:46 PM by BETTYE Vigil-SERGEY     Desired provider in labor: [x] CNM  [] Physician  [x] Blood Products: [x] Yes, accepts [] No, needs counseling  [x] Initial BMI: 27.45   [x] Prenatal Labs:   [x] Cervical Cancer Screening up to date: 23 nml, hpv neg  [x] Rh status: +  [x] Genetic Screening:  rr  [x] NT US: (11-13 wks): WNL  [x] Baby ASA (if indicated): age and nulliparity  [x] Pregnancy dated by: regular LMP    [x] Anatomy US: (19-20 wks): growth at 30 weeks  [] Federal Sterilization consent signed (if indicated):  [x] 1hr GCT at 24-28wks: 104  [x] Rhogam (if indicated): n/a  [] Fetal Surveillance (if indicated):  [x] Tdap (27-32 wks, may be given up to 36 wks if initial window missed): 3/18/25  [x] Flu Vaccine: declines    [x] Breastfeeding: exclusive pumping  [] Postpartum Birth control method: undecided  [] GBS at 36 - 37 wks:  [x] 39 weeks discussion of IOL vs. Expectant management: expectant management up to 41.0 weeks  [x] Mode of delivery ( anticipated ): vaginal         Primigravida of advanced maternal age in third trimester ([facility])          Objective   Weight: 80.7 kg (178 lb)  TW.165 kg (18 lb)  Pregravid BMI: 27.45    BP: 122/80      Prenatal Labs:  Lab Results   Component Value Date    HGB 11.2 (L) 2025    HCT 33.2 (L) 2025     (L) 2025    ABO A 2024    LABRH POS 2024    NEISSGONOAMP Negative 11/15/2024    CHLAMTRACAMP Negative 11/15/2024    SYPHT Nonreactive 2024    HEPBSAG Nonreactive 2024    HIV1X2 Nonreactive  11/07/2024    URINECULTURE No growth 10/16/2024       Assessment/Plan   BSUS for presentation, consent, and gbs at next visit.   Birth preferences collected today and will be scanned into patient's chart.  Reviewed s/sx of warning signs, fetal movement counts, and when to call provider  Follow up in 2 weeks for a routine prenatal visit.    BETTYE Vigil-SERGEY

## 2025-05-02 ENCOUNTER — APPOINTMENT (OUTPATIENT)
Dept: OBSTETRICS AND GYNECOLOGY | Facility: CLINIC | Age: 40
End: 2025-05-02
Payer: COMMERCIAL

## 2025-05-02 VITALS — WEIGHT: 184.6 LBS | DIASTOLIC BLOOD PRESSURE: 86 MMHG | BODY MASS INDEX: 31.69 KG/M2 | SYSTOLIC BLOOD PRESSURE: 131 MMHG

## 2025-05-02 DIAGNOSIS — Z3A.36 36 WEEKS GESTATION OF PREGNANCY (HHS-HCC): Primary | ICD-10-CM

## 2025-05-02 DIAGNOSIS — Z34.03 ENCOUNTER FOR SUPERVISION OF NORMAL FIRST PREGNANCY IN THIRD TRIMESTER: ICD-10-CM

## 2025-05-02 PROBLEM — Z3A.34 34 WEEKS GESTATION OF PREGNANCY (HHS-HCC): Status: RESOLVED | Noted: 2024-10-16 | Resolved: 2025-05-02

## 2025-05-02 PROCEDURE — 0501F PRENATAL FLOW SHEET: CPT | Performed by: MIDWIFE

## 2025-05-02 NOTE — PROGRESS NOTES
S: BART. Denies vb, lof, abdominal pain, cramping, contractions. Endorses good fetal movement.    O: See flow sheets    A: 40yo G1 at 36.4 per LMP    P: Reviewed BP, weight      E-consent signed      GBS collected --> NKDA      RTO in 1 week and sooner PRN      Next visit: Review GBS

## 2025-05-04 LAB — GP B STREP SPEC QL CULT: NORMAL

## 2025-05-05 LAB — GP B STREP SPEC QL CULT: NORMAL

## 2025-05-09 ENCOUNTER — HOSPITAL ENCOUNTER (INPATIENT)
Facility: HOSPITAL | Age: 40
End: 2025-05-09
Attending: OBSTETRICS & GYNECOLOGY
Payer: COMMERCIAL

## 2025-05-09 ENCOUNTER — APPOINTMENT (OUTPATIENT)
Dept: OBSTETRICS AND GYNECOLOGY | Facility: CLINIC | Age: 40
End: 2025-05-09
Payer: COMMERCIAL

## 2025-05-09 VITALS — BODY MASS INDEX: 31.69 KG/M2 | WEIGHT: 184.6 LBS | DIASTOLIC BLOOD PRESSURE: 95 MMHG | SYSTOLIC BLOOD PRESSURE: 135 MMHG

## 2025-05-09 DIAGNOSIS — O09.523 MULTIGRAVIDA OF ADVANCED MATERNAL AGE IN THIRD TRIMESTER (HHS-HCC): ICD-10-CM

## 2025-05-09 DIAGNOSIS — Z3A.37 37 WEEKS GESTATION OF PREGNANCY (HHS-HCC): Primary | ICD-10-CM

## 2025-05-09 DIAGNOSIS — R03.0 ELEVATED BLOOD PRESSURE READING IN OFFICE WITHOUT DIAGNOSIS OF HYPERTENSION: ICD-10-CM

## 2025-05-09 PROBLEM — O13.9 GESTATIONAL HYPERTENSION, UNSPECIFIED TRIMESTER (HHS-HCC): Status: ACTIVE | Noted: 2025-05-09

## 2025-05-09 LAB
ABO GROUP (TYPE) IN BLOOD: NORMAL
ALBUMIN SERPL BCP-MCNC: 3.3 G/DL (ref 3.4–5)
ALP SERPL-CCNC: 105 U/L (ref 33–110)
ALT SERPL W P-5'-P-CCNC: 10 U/L (ref 7–45)
ANION GAP SERPL CALC-SCNC: 12 MMOL/L (ref 10–20)
ANTIBODY SCREEN: NORMAL
AST SERPL W P-5'-P-CCNC: 14 U/L (ref 9–39)
BILIRUB SERPL-MCNC: 0.2 MG/DL (ref 0–1.2)
BUN SERPL-MCNC: 15 MG/DL (ref 6–23)
CALCIUM SERPL-MCNC: 8.4 MG/DL (ref 8.6–10.3)
CHLORIDE SERPL-SCNC: 107 MMOL/L (ref 98–107)
CO2 SERPL-SCNC: 21 MMOL/L (ref 21–32)
CREAT SERPL-MCNC: 0.77 MG/DL (ref 0.5–1.05)
CREAT UR-MCNC: 151.1 MG/DL (ref 20–320)
EGFRCR SERPLBLD CKD-EPI 2021: >90 ML/MIN/1.73M*2
ERYTHROCYTE [DISTWIDTH] IN BLOOD BY AUTOMATED COUNT: 12.1 % (ref 11.5–14.5)
GLUCOSE SERPL-MCNC: 82 MG/DL (ref 74–99)
HCT VFR BLD AUTO: 31.2 % (ref 36–46)
HGB BLD-MCNC: 10.6 G/DL (ref 12–16)
LDH SERPL L TO P-CCNC: 139 U/L (ref 84–246)
MCH RBC QN AUTO: 28.8 PG (ref 26–34)
MCHC RBC AUTO-ENTMCNC: 34 G/DL (ref 32–36)
MCV RBC AUTO: 85 FL (ref 80–100)
NRBC BLD-RTO: 0 /100 WBCS (ref 0–0)
PLATELET # BLD AUTO: 161 X10*3/UL (ref 150–450)
POTASSIUM SERPL-SCNC: 3.6 MMOL/L (ref 3.5–5.3)
PROT SERPL-MCNC: 5.7 G/DL (ref 6.4–8.2)
PROT UR-ACNC: 15 MG/DL (ref 5–24)
PROT/CREAT UR: 0.1 MG/MG CREAT (ref 0–0.17)
RBC # BLD AUTO: 3.68 X10*6/UL (ref 4–5.2)
RH FACTOR (ANTIGEN D): NORMAL
SODIUM SERPL-SCNC: 136 MMOL/L (ref 136–145)
URATE SERPL-MCNC: 5.2 MG/DL (ref 2.3–6.7)
WBC # BLD AUTO: 8.1 X10*3/UL (ref 4.4–11.3)

## 2025-05-09 PROCEDURE — 99213 OFFICE O/P EST LOW 20 MIN: CPT | Performed by: ADVANCED PRACTICE MIDWIFE

## 2025-05-09 PROCEDURE — 59050 FETAL MONITOR W/REPORT: CPT

## 2025-05-09 PROCEDURE — 7210000002 HC LABOR PER HOUR

## 2025-05-09 PROCEDURE — 36415 COLL VENOUS BLD VENIPUNCTURE: CPT | Performed by: ADVANCED PRACTICE MIDWIFE

## 2025-05-09 PROCEDURE — 86780 TREPONEMA PALLIDUM: CPT | Mod: STJLAB

## 2025-05-09 PROCEDURE — 86850 RBC ANTIBODY SCREEN: CPT | Performed by: ADVANCED PRACTICE MIDWIFE

## 2025-05-09 PROCEDURE — 85027 COMPLETE CBC AUTOMATED: CPT | Performed by: ADVANCED PRACTICE MIDWIFE

## 2025-05-09 PROCEDURE — 86923 COMPATIBILITY TEST ELECTRIC: CPT

## 2025-05-09 PROCEDURE — 84156 ASSAY OF PROTEIN URINE: CPT | Performed by: ADVANCED PRACTICE MIDWIFE

## 2025-05-09 PROCEDURE — 84075 ASSAY ALKALINE PHOSPHATASE: CPT | Performed by: ADVANCED PRACTICE MIDWIFE

## 2025-05-09 PROCEDURE — 1120000001 HC OB PRIVATE ROOM DAILY

## 2025-05-09 PROCEDURE — 0501F PRENATAL FLOW SHEET: CPT

## 2025-05-09 PROCEDURE — 86762 RUBELLA ANTIBODY: CPT | Mod: STJLAB

## 2025-05-09 PROCEDURE — 84550 ASSAY OF BLOOD/URIC ACID: CPT | Performed by: ADVANCED PRACTICE MIDWIFE

## 2025-05-09 PROCEDURE — 83615 LACTATE (LD) (LDH) ENZYME: CPT | Performed by: ADVANCED PRACTICE MIDWIFE

## 2025-05-09 PROCEDURE — 59025 FETAL NON-STRESS TEST: CPT | Performed by: ADVANCED PRACTICE MIDWIFE

## 2025-05-09 PROCEDURE — 2500000001 HC RX 250 WO HCPCS SELF ADMINISTERED DRUGS (ALT 637 FOR MEDICARE OP)

## 2025-05-09 RX ORDER — OXYTOCIN 10 [USP'U]/ML
10 INJECTION, SOLUTION INTRAMUSCULAR; INTRAVENOUS ONCE AS NEEDED
Status: DISCONTINUED | OUTPATIENT
Start: 2025-05-09 | End: 2025-05-10 | Stop reason: HOSPADM

## 2025-05-09 RX ORDER — OXYTOCIN/0.9 % SODIUM CHLORIDE 30/500 ML
60 PLASTIC BAG, INJECTION (ML) INTRAVENOUS ONCE AS NEEDED
Status: DISCONTINUED | OUTPATIENT
Start: 2025-05-09 | End: 2025-05-10 | Stop reason: HOSPADM

## 2025-05-09 RX ORDER — LABETALOL HYDROCHLORIDE 5 MG/ML
20 INJECTION, SOLUTION INTRAVENOUS ONCE AS NEEDED
Status: DISCONTINUED | OUTPATIENT
Start: 2025-05-09 | End: 2025-05-10 | Stop reason: HOSPADM

## 2025-05-09 RX ORDER — CARBOPROST TROMETHAMINE 250 UG/ML
250 INJECTION, SOLUTION INTRAMUSCULAR ONCE AS NEEDED
Status: DISCONTINUED | OUTPATIENT
Start: 2025-05-09 | End: 2025-05-10 | Stop reason: HOSPADM

## 2025-05-09 RX ORDER — METHYLERGONOVINE MALEATE 0.2 MG/ML
0.2 INJECTION INTRAVENOUS ONCE AS NEEDED
Status: DISCONTINUED | OUTPATIENT
Start: 2025-05-09 | End: 2025-05-10 | Stop reason: HOSPADM

## 2025-05-09 RX ORDER — HYDRALAZINE HYDROCHLORIDE 20 MG/ML
5 INJECTION INTRAMUSCULAR; INTRAVENOUS ONCE AS NEEDED
Status: DISCONTINUED | OUTPATIENT
Start: 2025-05-09 | End: 2025-05-10

## 2025-05-09 RX ORDER — OXYTOCIN/0.9 % SODIUM CHLORIDE 30/500 ML
60 PLASTIC BAG, INJECTION (ML) INTRAVENOUS ONCE AS NEEDED
Status: COMPLETED | OUTPATIENT
Start: 2025-05-09 | End: 2025-05-10

## 2025-05-09 RX ORDER — ONDANSETRON 4 MG/1
4 TABLET, FILM COATED ORAL EVERY 6 HOURS PRN
Status: DISCONTINUED | OUTPATIENT
Start: 2025-05-09 | End: 2025-05-10

## 2025-05-09 RX ORDER — HYDRALAZINE HYDROCHLORIDE 20 MG/ML
5 INJECTION INTRAMUSCULAR; INTRAVENOUS ONCE AS NEEDED
Status: DISCONTINUED | OUTPATIENT
Start: 2025-05-09 | End: 2025-05-10 | Stop reason: HOSPADM

## 2025-05-09 RX ORDER — LIDOCAINE HYDROCHLORIDE 10 MG/ML
0.5 INJECTION, SOLUTION EPIDURAL; INFILTRATION; INTRACAUDAL; PERINEURAL ONCE AS NEEDED
Status: DISCONTINUED | OUTPATIENT
Start: 2025-05-09 | End: 2025-05-10

## 2025-05-09 RX ORDER — LOPERAMIDE HYDROCHLORIDE 2 MG/1
4 CAPSULE ORAL EVERY 2 HOUR PRN
Status: DISCONTINUED | OUTPATIENT
Start: 2025-05-09 | End: 2025-05-10 | Stop reason: HOSPADM

## 2025-05-09 RX ORDER — TRANEXAMIC ACID 100 MG/ML
1000 INJECTION, SOLUTION INTRAVENOUS ONCE AS NEEDED
Status: DISCONTINUED | OUTPATIENT
Start: 2025-05-09 | End: 2025-05-10 | Stop reason: HOSPADM

## 2025-05-09 RX ORDER — LABETALOL HYDROCHLORIDE 5 MG/ML
20 INJECTION, SOLUTION INTRAVENOUS ONCE AS NEEDED
Status: DISCONTINUED | OUTPATIENT
Start: 2025-05-09 | End: 2025-05-10

## 2025-05-09 RX ORDER — LIDOCAINE HYDROCHLORIDE 10 MG/ML
30 INJECTION, SOLUTION INFILTRATION; PERINEURAL ONCE AS NEEDED
Status: DISCONTINUED | OUTPATIENT
Start: 2025-05-09 | End: 2025-05-10 | Stop reason: HOSPADM

## 2025-05-09 RX ORDER — TERBUTALINE SULFATE 1 MG/ML
0.25 INJECTION SUBCUTANEOUS ONCE AS NEEDED
Status: COMPLETED | OUTPATIENT
Start: 2025-05-09 | End: 2025-05-10

## 2025-05-09 RX ORDER — ONDANSETRON HYDROCHLORIDE 2 MG/ML
4 INJECTION, SOLUTION INTRAVENOUS EVERY 6 HOURS PRN
Status: DISCONTINUED | OUTPATIENT
Start: 2025-05-09 | End: 2025-05-10

## 2025-05-09 RX ORDER — SODIUM CHLORIDE, SODIUM LACTATE, POTASSIUM CHLORIDE, CALCIUM CHLORIDE 600; 310; 30; 20 MG/100ML; MG/100ML; MG/100ML; MG/100ML
75 INJECTION, SOLUTION INTRAVENOUS CONTINUOUS
Status: ACTIVE | OUTPATIENT
Start: 2025-05-09 | End: 2025-05-11

## 2025-05-09 RX ORDER — MISOPROSTOL 200 UG/1
800 TABLET ORAL ONCE AS NEEDED
Status: DISCONTINUED | OUTPATIENT
Start: 2025-05-09 | End: 2025-05-10 | Stop reason: HOSPADM

## 2025-05-09 RX ADMIN — MISOPROSTOL 25 MCG: 100 TABLET ORAL at 21:49

## 2025-05-09 SDOH — HEALTH STABILITY: MENTAL HEALTH: WISH TO BE DEAD (PAST 1 MONTH): NO

## 2025-05-09 SDOH — SOCIAL STABILITY: SOCIAL INSECURITY: HAS ANYONE EVER THREATENED TO HURT YOUR FAMILY OR YOUR PETS?: NO

## 2025-05-09 SDOH — SOCIAL STABILITY: SOCIAL INSECURITY: WITHIN THE LAST YEAR, HAVE YOU BEEN HUMILIATED OR EMOTIONALLY ABUSED IN OTHER WAYS BY YOUR PARTNER OR EX-PARTNER?: NO

## 2025-05-09 SDOH — ECONOMIC STABILITY: FOOD INSECURITY: WITHIN THE PAST 12 MONTHS, YOU WORRIED THAT YOUR FOOD WOULD RUN OUT BEFORE YOU GOT THE MONEY TO BUY MORE.: NEVER TRUE

## 2025-05-09 SDOH — SOCIAL STABILITY: SOCIAL INSECURITY
WITHIN THE LAST YEAR, HAVE YOU BEEN KICKED, HIT, SLAPPED, OR OTHERWISE PHYSICALLY HURT BY YOUR PARTNER OR EX-PARTNER?: NO

## 2025-05-09 SDOH — HEALTH STABILITY: MENTAL HEALTH: HOW OFTEN DO YOU HAVE SIX OR MORE DRINKS ON ONE OCCASION?: NEVER

## 2025-05-09 SDOH — ECONOMIC STABILITY: FOOD INSECURITY: WITHIN THE PAST 12 MONTHS, THE FOOD YOU BOUGHT JUST DIDN'T LAST AND YOU DIDN'T HAVE MONEY TO GET MORE.: NEVER TRUE

## 2025-05-09 SDOH — HEALTH STABILITY: MENTAL HEALTH: HOW MANY DRINKS CONTAINING ALCOHOL DO YOU HAVE ON A TYPICAL DAY WHEN YOU ARE DRINKING?: PATIENT DOES NOT DRINK

## 2025-05-09 SDOH — SOCIAL STABILITY: SOCIAL INSECURITY
WITHIN THE LAST YEAR, HAVE YOU BEEN RAPED OR FORCED TO HAVE ANY KIND OF SEXUAL ACTIVITY BY YOUR PARTNER OR EX-PARTNER?: NO

## 2025-05-09 SDOH — SOCIAL STABILITY: SOCIAL INSECURITY: ARE YOU OR HAVE YOU BEEN THREATENED OR ABUSED PHYSICALLY, EMOTIONALLY, OR SEXUALLY BY ANYONE?: NO

## 2025-05-09 SDOH — SOCIAL STABILITY: SOCIAL INSECURITY: WITHIN THE LAST YEAR, HAVE YOU BEEN AFRAID OF YOUR PARTNER OR EX-PARTNER?: NO

## 2025-05-09 SDOH — SOCIAL STABILITY: SOCIAL INSECURITY: ABUSE SCREEN: ADULT

## 2025-05-09 SDOH — HEALTH STABILITY: MENTAL HEALTH: HOW OFTEN DO YOU HAVE A DRINK CONTAINING ALCOHOL?: NEVER

## 2025-05-09 SDOH — HEALTH STABILITY: MENTAL HEALTH: SUICIDAL BEHAVIOR (LIFETIME): NO

## 2025-05-09 SDOH — HEALTH STABILITY: MENTAL HEALTH: WERE YOU ABLE TO COMPLETE ALL THE BEHAVIORAL HEALTH SCREENINGS?: YES

## 2025-05-09 SDOH — ECONOMIC STABILITY: HOUSING INSECURITY: DO YOU FEEL UNSAFE GOING BACK TO THE PLACE WHERE YOU ARE LIVING?: NO

## 2025-05-09 SDOH — SOCIAL STABILITY: SOCIAL INSECURITY: DOES ANYONE TRY TO KEEP YOU FROM HAVING/CONTACTING OTHER FRIENDS OR DOING THINGS OUTSIDE YOUR HOME?: NO

## 2025-05-09 SDOH — SOCIAL STABILITY: SOCIAL INSECURITY: ARE THERE ANY APPARENT SIGNS OF INJURIES/BEHAVIORS THAT COULD BE RELATED TO ABUSE/NEGLECT?: NO

## 2025-05-09 SDOH — HEALTH STABILITY: MENTAL HEALTH: NON-SPECIFIC ACTIVE SUICIDAL THOUGHTS (PAST 1 MONTH): NO

## 2025-05-09 SDOH — SOCIAL STABILITY: SOCIAL INSECURITY: DO YOU FEEL ANYONE HAS EXPLOITED OR TAKEN ADVANTAGE OF YOU FINANCIALLY OR OF YOUR PERSONAL PROPERTY?: NO

## 2025-05-09 SDOH — SOCIAL STABILITY: SOCIAL INSECURITY: HAVE YOU HAD THOUGHTS OF HARMING ANYONE ELSE?: NO

## 2025-05-09 SDOH — SOCIAL STABILITY: SOCIAL INSECURITY: VERBAL ABUSE: DENIES

## 2025-05-09 SDOH — SOCIAL STABILITY: SOCIAL INSECURITY: PHYSICAL ABUSE: DENIES

## 2025-05-09 ASSESSMENT — LIFESTYLE VARIABLES
HOW OFTEN DO YOU HAVE 6 OR MORE DRINKS ON ONE OCCASION: NEVER
HOW MANY STANDARD DRINKS CONTAINING ALCOHOL DO YOU HAVE ON A TYPICAL DAY: PATIENT DOES NOT DRINK
SKIP TO QUESTIONS 9-10: 1
AUDIT-C TOTAL SCORE: 0
HOW OFTEN DO YOU HAVE A DRINK CONTAINING ALCOHOL: NEVER
SKIP TO QUESTIONS 9-10: 1
AUDIT-C TOTAL SCORE: 0
AUDIT-C TOTAL SCORE: 0

## 2025-05-09 ASSESSMENT — PAIN SCALES - GENERAL
PAINLEVEL_OUTOF10: 0 - NO PAIN
PAINLEVEL_OUTOF10: 1
PAINLEVEL_OUTOF10: 0 - NO PAIN
PAINLEVEL_OUTOF10: 0 - NO PAIN

## 2025-05-09 ASSESSMENT — ACTIVITIES OF DAILY LIVING (ADL): LACK_OF_TRANSPORTATION: NO

## 2025-05-09 ASSESSMENT — PATIENT HEALTH QUESTIONNAIRE - PHQ9
1. LITTLE INTEREST OR PLEASURE IN DOING THINGS: NOT AT ALL
SUM OF ALL RESPONSES TO PHQ9 QUESTIONS 1 & 2: 0
2. FEELING DOWN, DEPRESSED OR HOPELESS: NOT AT ALL

## 2025-05-09 NOTE — PROGRESS NOTES
Subjective     Leonila Raymundo is a 39 y.o.  at 37w4d with a working estimated date of delivery of 2025, by Last Menstrual Period who presents for a routine prenatal visit. She denies vaginal bleeding, leakage of fluid, decreased fetal movements, or contractions.      BP elevated on assessment today. Patient denies any HA, visual disturbances, upper belly pain, heartburn, SOB, or chest pain. Patient does report BLE.       Her pregnancy is complicated by:  Medical Problems       Problem List       Primigravida of advanced maternal age in third trimester ([facility])    37 weeks gestation of pregnancy ([facility])    Overview Signed 2025 10:06 AM by JEANINE Connolly   Desired provider in labor: [x] CNM  [] Physician  [x] Blood Products: [x] Yes, accepts [] No, needs counseling  [x] Initial BMI: 27.45   [x] Prenatal Labs:   [x] Cervical Cancer Screening up to date: 23 nml, hpv neg  [x] Rh status: +  [x] Genetic Screening:  rr  [x] NT US: (11-13 wks): WNL  [x] Baby ASA (if indicated): age and nulliparity  [x] Pregnancy dated by: regular LMP  [x] Anatomy US: (19-20 wks): growth at 30 weeks  [x] Federal Sterilization consent signed (if indicated): N/A  [x] 1hr GCT at 24-28wks: 104  [x] Rhogam (if indicated): n/a  [] Fetal Surveillance (if indicated):  [x] Tdap (27-32 wks, may be given up to 36 wks if initial window missed): 3/18/25  [x] Flu Vaccine: declines  [x] Breastfeeding: exclusive pumping  [] Postpartum Birth control method: undecided  [] GBS at 36 - 37 wks: collected   [x] 39 weeks discussion of IOL vs. Expectant management: expectant management up to 41.0 weeks  [x] Mode of delivery ( anticipated ): vaginal               Objective   Weight: 83.7 kg (184 lb 9.6 oz)  TW.2 kg (24 lb 9.6 oz)  Pregravid BMI: 27.45    BP: (!) 135/95          Prenatal Labs:  Lab Results   Component Value Date    HGB 11.2 (L) 2025    HCT 33.2 (L) 2025     (L) 2025    ABO A 2024     LABRH POS 11/07/2024    NEISSGONOAMP Negative 11/15/2024    CHLAMTRACAMP Negative 11/15/2024    SYPHT Nonreactive 11/07/2024    HEPBSAG Nonreactive 11/07/2024    HIV1X2 Nonreactive 11/07/2024    URINECULTURE No growth 10/16/2024       Assessment/Plan   GBS results reviewed and neg  Elevated BP: discussed with patient and SO recommendation for additional monitoring on labor and delivery. Briefly discussed HDP.       JEANINE Vigil

## 2025-05-10 ENCOUNTER — ANESTHESIA (OUTPATIENT)
Dept: OBSTETRICS AND GYNECOLOGY | Facility: HOSPITAL | Age: 40
End: 2025-05-10
Payer: COMMERCIAL

## 2025-05-10 ENCOUNTER — ANESTHESIA EVENT (OUTPATIENT)
Dept: OBSTETRICS AND GYNECOLOGY | Facility: HOSPITAL | Age: 40
End: 2025-05-10
Payer: COMMERCIAL

## 2025-05-10 LAB
ABO GROUP (TYPE) IN BLOOD: NORMAL
BLOOD EXPIRATION DATE: NORMAL
DISPENSE STATUS: NORMAL
PRODUCT BLOOD TYPE: 6200
PRODUCT CODE: NORMAL
RH FACTOR (ANTIGEN D): NORMAL
RUBV IGG SERPL IA-ACNC: 0.5 IA
RUBV IGG SERPL QL IA: NEGATIVE
UNIT ABO: NORMAL
UNIT NUMBER: NORMAL
UNIT RH: NORMAL
UNIT VOLUME: 350
XM INTEP: NORMAL

## 2025-05-10 PROCEDURE — 01967 NEURAXL LBR ANES VAG DLVR: CPT | Performed by: NURSE ANESTHETIST, CERTIFIED REGISTERED

## 2025-05-10 PROCEDURE — 2500000004 HC RX 250 GENERAL PHARMACY W/ HCPCS (ALT 636 FOR OP/ED): Mod: JZ

## 2025-05-10 PROCEDURE — 2500000004 HC RX 250 GENERAL PHARMACY W/ HCPCS (ALT 636 FOR OP/ED): Performed by: NURSE ANESTHETIST, CERTIFIED REGISTERED

## 2025-05-10 PROCEDURE — 7210000002 HC LABOR PER HOUR

## 2025-05-10 PROCEDURE — 3E033VJ INTRODUCTION OF OTHER HORMONE INTO PERIPHERAL VEIN, PERCUTANEOUS APPROACH: ICD-10-PCS | Performed by: OBSTETRICS & GYNECOLOGY

## 2025-05-10 PROCEDURE — 0UQGXZZ REPAIR VAGINA, EXTERNAL APPROACH: ICD-10-PCS | Performed by: OBSTETRICS & GYNECOLOGY

## 2025-05-10 PROCEDURE — 36415 COLL VENOUS BLD VENIPUNCTURE: CPT

## 2025-05-10 PROCEDURE — 1220000001 HC OB SEMI-PRIVATE ROOM DAILY

## 2025-05-10 PROCEDURE — 88307 TISSUE EXAM BY PATHOLOGIST: CPT | Mod: TC,STJLAB | Performed by: OBSTETRICS & GYNECOLOGY

## 2025-05-10 PROCEDURE — 2500000001 HC RX 250 WO HCPCS SELF ADMINISTERED DRUGS (ALT 637 FOR MEDICARE OP)

## 2025-05-10 PROCEDURE — 2720000007 HC OR 272 NO HCPCS

## 2025-05-10 PROCEDURE — 3700000014 EPIDURAL BLOCK: Performed by: NURSE ANESTHETIST, CERTIFIED REGISTERED

## 2025-05-10 PROCEDURE — 7100000016 HC LABOR RECOVERY PER HOUR

## 2025-05-10 PROCEDURE — 2500000001 HC RX 250 WO HCPCS SELF ADMINISTERED DRUGS (ALT 637 FOR MEDICARE OP): Performed by: ADVANCED PRACTICE MIDWIFE

## 2025-05-10 PROCEDURE — 59400 OBSTETRICAL CARE: CPT | Performed by: OBSTETRICS & GYNECOLOGY

## 2025-05-10 RX ORDER — POLYETHYLENE GLYCOL 3350 17 G/17G
17 POWDER, FOR SOLUTION ORAL 2 TIMES DAILY PRN
Status: DISCONTINUED | OUTPATIENT
Start: 2025-05-10 | End: 2025-05-12 | Stop reason: HOSPADM

## 2025-05-10 RX ORDER — SIMETHICONE 80 MG
80 TABLET,CHEWABLE ORAL 4 TIMES DAILY PRN
Status: DISCONTINUED | OUTPATIENT
Start: 2025-05-10 | End: 2025-05-12 | Stop reason: HOSPADM

## 2025-05-10 RX ORDER — MINERAL OIL
OIL (ML) ORAL
Status: DISPENSED
Start: 2025-05-10 | End: 2025-05-11

## 2025-05-10 RX ORDER — LIDOCAINE HCL/EPINEPHRINE/PF 2%-1:200K
VIAL (ML) INJECTION AS NEEDED
Status: DISCONTINUED | OUTPATIENT
Start: 2025-05-10 | End: 2025-05-10

## 2025-05-10 RX ORDER — FENTANYL/ROPIVACAINE/NS/PF 2MCG/ML-.2
0-25 PLASTIC BAG, INJECTION (ML) INJECTION CONTINUOUS
Refills: 0 | Status: DISCONTINUED | OUTPATIENT
Start: 2025-05-10 | End: 2025-05-10

## 2025-05-10 RX ORDER — DIPHENHYDRAMINE HYDROCHLORIDE 50 MG/ML
25 INJECTION, SOLUTION INTRAMUSCULAR; INTRAVENOUS EVERY 6 HOURS PRN
Status: DISCONTINUED | OUTPATIENT
Start: 2025-05-10 | End: 2025-05-12 | Stop reason: HOSPADM

## 2025-05-10 RX ORDER — DEXMEDETOMIDINE HYDROCHLORIDE 100 UG/ML
INJECTION, SOLUTION INTRAVENOUS AS NEEDED
Status: DISCONTINUED | OUTPATIENT
Start: 2025-05-10 | End: 2025-05-10

## 2025-05-10 RX ORDER — ACETAMINOPHEN 325 MG/1
975 TABLET ORAL EVERY 6 HOURS SCHEDULED
Status: DISCONTINUED | OUTPATIENT
Start: 2025-05-10 | End: 2025-05-12 | Stop reason: HOSPADM

## 2025-05-10 RX ORDER — BISACODYL 10 MG/1
10 SUPPOSITORY RECTAL DAILY PRN
Status: DISCONTINUED | OUTPATIENT
Start: 2025-05-10 | End: 2025-05-12 | Stop reason: HOSPADM

## 2025-05-10 RX ORDER — NIFEDIPINE 10 MG/1
10 CAPSULE ORAL ONCE AS NEEDED
Status: DISCONTINUED | OUTPATIENT
Start: 2025-05-10 | End: 2025-05-12 | Stop reason: HOSPADM

## 2025-05-10 RX ORDER — OXYTOCIN/0.9 % SODIUM CHLORIDE 30/500 ML
2-30 PLASTIC BAG, INJECTION (ML) INTRAVENOUS CONTINUOUS
Status: DISCONTINUED | OUTPATIENT
Start: 2025-05-10 | End: 2025-05-10

## 2025-05-10 RX ORDER — ENOXAPARIN SODIUM 100 MG/ML
40 INJECTION SUBCUTANEOUS EVERY 24 HOURS
Status: DISCONTINUED | OUTPATIENT
Start: 2025-05-11 | End: 2025-05-12 | Stop reason: HOSPADM

## 2025-05-10 RX ORDER — DIPHENHYDRAMINE HCL 25 MG
25 TABLET ORAL EVERY 6 HOURS PRN
Status: DISCONTINUED | OUTPATIENT
Start: 2025-05-10 | End: 2025-05-12 | Stop reason: HOSPADM

## 2025-05-10 RX ORDER — ONDANSETRON HYDROCHLORIDE 2 MG/ML
4 INJECTION, SOLUTION INTRAVENOUS EVERY 6 HOURS PRN
Status: DISCONTINUED | OUTPATIENT
Start: 2025-05-10 | End: 2025-05-12 | Stop reason: HOSPADM

## 2025-05-10 RX ORDER — ONDANSETRON 4 MG/1
4 TABLET, FILM COATED ORAL EVERY 6 HOURS PRN
Status: DISCONTINUED | OUTPATIENT
Start: 2025-05-10 | End: 2025-05-12 | Stop reason: HOSPADM

## 2025-05-10 RX ORDER — ADHESIVE BANDAGE
10 BANDAGE TOPICAL
Status: DISCONTINUED | OUTPATIENT
Start: 2025-05-10 | End: 2025-05-12 | Stop reason: HOSPADM

## 2025-05-10 RX ORDER — IBUPROFEN 600 MG/1
600 TABLET ORAL EVERY 6 HOURS SCHEDULED
Status: DISCONTINUED | OUTPATIENT
Start: 2025-05-10 | End: 2025-05-12 | Stop reason: HOSPADM

## 2025-05-10 RX ADMIN — TERBUTALINE SULFATE 0.25 MG: 1 INJECTION, SOLUTION SUBCUTANEOUS at 12:32

## 2025-05-10 RX ADMIN — Medication 8 ML/HR: at 11:52

## 2025-05-10 RX ADMIN — Medication 2 MILLI-UNITS/MIN: at 10:30

## 2025-05-10 RX ADMIN — MISOPROSTOL 25 MCG: 100 TABLET ORAL at 00:54

## 2025-05-10 RX ADMIN — SODIUM CHLORIDE, SODIUM LACTATE, POTASSIUM CHLORIDE, AND CALCIUM CHLORIDE 500 ML: .6; .31; .03; .02 INJECTION, SOLUTION INTRAVENOUS at 11:15

## 2025-05-10 RX ADMIN — LIDOCAINE HYDROCHLORIDE,EPINEPHRINE BITARTRATE 1 ML: 20; .005 INJECTION, SOLUTION EPIDURAL; INFILTRATION; INTRACAUDAL; PERINEURAL at 12:32

## 2025-05-10 RX ADMIN — SODIUM CHLORIDE, SODIUM LACTATE, POTASSIUM CHLORIDE, AND CALCIUM CHLORIDE 75 ML/HR: .6; .31; .03; .02 INJECTION, SOLUTION INTRAVENOUS at 12:01

## 2025-05-10 RX ADMIN — SODIUM CHLORIDE, SODIUM LACTATE, POTASSIUM CHLORIDE, AND CALCIUM CHLORIDE 500 ML: .6; .31; .03; .02 INJECTION, SOLUTION INTRAVENOUS at 12:32

## 2025-05-10 RX ADMIN — ACETAMINOPHEN 975 MG: 325 TABLET ORAL at 18:40

## 2025-05-10 RX ADMIN — DEXMEDETOMIDINE HYDROCHLORIDE 5 MCG: 100 INJECTION, SOLUTION INTRAVENOUS at 12:20

## 2025-05-10 RX ADMIN — LIDOCAINE HYDROCHLORIDE,EPINEPHRINE BITARTRATE 2 ML: 20; .005 INJECTION, SOLUTION EPIDURAL; INFILTRATION; INTRACAUDAL; PERINEURAL at 12:27

## 2025-05-10 RX ADMIN — Medication 60 MILLI-UNITS/MIN: at 14:20

## 2025-05-10 RX ADMIN — IBUPROFEN 600 MG: 600 TABLET ORAL at 18:40

## 2025-05-10 RX ADMIN — DEXMEDETOMIDINE HYDROCHLORIDE 5 MCG: 100 INJECTION, SOLUTION INTRAVENOUS at 12:32

## 2025-05-10 SDOH — HEALTH STABILITY: MENTAL HEALTH: CURRENT SMOKER: 0

## 2025-05-10 ASSESSMENT — PAIN SCALES - GENERAL
PAINLEVEL_OUTOF10: 4
PAINLEVEL_OUTOF10: 5 - MODERATE PAIN
PAINLEVEL_OUTOF10: 4
PAINLEVEL_OUTOF10: 4
PAINLEVEL_OUTOF10: 1
PAINLEVEL_OUTOF10: 4
PAINLEVEL_OUTOF10: 0 - NO PAIN
PAINLEVEL_OUTOF10: 5 - MODERATE PAIN
PAINLEVEL_OUTOF10: 0 - NO PAIN
PAINLEVEL_OUTOF10: 2
PAINLEVEL_OUTOF10: 5 - MODERATE PAIN
PAINLEVEL_OUTOF10: 0 - NO PAIN
PAINLEVEL_OUTOF10: 7

## 2025-05-10 NOTE — L&D DELIVERY NOTE
Vaginal Delivery Note    Patient Name: Leonila Raymundo  : 1985  MRN: [id number]  Age: 39 y.o.    /Para:   Gestational Age: 37w5d    Date of Delivery: 5/10/2025    Procedure: Vacuum Assisted Vaginal Delivery    Delivery Provider:  Kenyatta/Brianne alexis    Resident/Fellow/Other Assistant:     Description of Procedure:  Patient was counseled on the recommendation to proceed with operative vacuum assisted vaginal delivery.    Risks for vacuum assisted vaginal delivery were reviewed including but not limited to:  - Maternal: Increased risk of 3rd or 4th degree laceration, bleeding  - Fetal: Increased risk of facial laceration, facial nerve palsy, corneal abrasion external ocular trauma, skull fracture, and 0.1% chance of intracranial and subgaleal hemorrhage as well as shoulder dystocia.      delivery was also presented as an alternative option. Also, discussed that if operative delivery is unsuccessful, recommend proceeding to urgent  delivery.     Patient verbalized understanding and consented to proceeding with vacuum assisted vaginal delivery.    Bladder was drained. Fetal station was determined to be +3. Fetal position was PARAG with 100% effacement. Estimated fetal weight was 6.6 lb. Perineal anesthesia confirmed to be adequate. The vacuum was placed and the correct placement in front of the posterior fontanelle was confirmed digitally. With the patient's next contraction, the vacuum was inflated and a gentle downward pressure was used to assist with bringing the baby's head to +4 station. The vacuum was deflated and removed. The baby's head then delivered atraumatically.     Delayed clamping was performed. The infant was placed skin to skin.. Cord gases were not sent.  Cord blood was collected. Placenta delivered intact and fundus was firm    second Laceration identified and repaired.    Findings:   Amniotic fluid Clear, Male infant in Vertex Occiput Anterior presentation,  APGARS 9 , 9 .  Birth Weight 2.693 kg.    Complications: Fetal Intolerance    Quantitative Blood Loss: 300  Delivery Blood Loss   Intrapartum & Postpartum: 05/10/25 0150 - 05/10/25 1533    Delivery Admission: 05/09/25 1809 - 05/10/25 1533         Intrapartum & Postpartum Delivery Admission    Quantitative Blood Loss - (mL) Hospital Encounter 160 mL 160 mL    Total  160 mL 160 mL            Blood products:      Uterotonics/Hemostatic Agent: IV Pitocin 30 units    Specimen:   Placenta  Delivered: 5/10/2025  1:56 PM  Appearance: Intact  Removal: Spontaneous    Disposition: lab    Sponge/Instrument/Needle Counts: The sponge, lap and needle counts were correct.    Patient Disposition: Patient recovering on labor and delivery in stable condition.    Additional Procedures:  None    Lisy Raymundo [[id number]]      Labor Events    Sac identifier: Sac 1  Rupture date/time: 5/10/2025 1200  Rupture type: Spontaneous  Fluid color: Clear  Fluid odor: None  Labor type: Induced Onset of Labor  Labor allowed to proceed with plans for an attempted vaginal birth?: Yes  Induction: Misoprostol  Induction indications: Hypertensive Disorder of Pregnancy  Complications: Fetal Intolerance       Labor Event Times    Dilation complete date/time: 5/10/2025 1210  Start pushing date/time: 5/10/2025 12:15       Labor Length    2nd stage: 1h 40m  3rd stage: 0h 06m       Placenta    Placenta delivery date/time: 5/10/2025 13:56  Placenta removal: Spontaneous  Placenta appearance: Intact  Placenta disposition: lab       Cord    Vessels: 3 vessels  Complications: None  Delayed cord clamping?: Yes  Cord blood disposition: Lab  Gases sent?: No  Cord comments: cord relatively short  Stem cell collection (by provider): No       Lacerations    Episiotomy: None  Perineal laceration: None  Vaginal laceration?: Yes  Vaginal laceration location: left  Vaginal laceration repaired?: Yes  Repair suture: 3-0 Synthetic Suture       Anesthesia    Method: Epidural        Operative Delivery    Forceps attempted?: No  Vacuum extractor attempted?: Yes  Vacuum indications: Fetal Heart Rate or Rhythm Abnormality  Vacuum type: Kiwi  Vacuum application location: Outlet  Number of pop offs: 0  Number of pulls with vacuum: 1  Failed vacuum delivery?: No       Shoulder Dystocia    Shoulder dystocia present?: No       [place] Delivery    Time head delivered: 5/10/2025 13:50:00  Birth date/time: 5/10/2025 13:50:00  Delivery type: Vaginal, Vacuum (Extractor)  Complications: Fetal Intolerance       Resuscitation    Method: None       Apgars    Living status: Living  Apgar Component Scores:  1 min.:  5 min.:  10 min.:  15 min.:  20 min.:    Skin color:  1  1       Heart rate:  2  2       Reflex irritability:  2  2       Muscle tone:  2  2       Respiratory effort:  2  2       Total:  9  9       Apgars assigned by: KORINNE BECKS       Delivery Providers    Delivering clinician: Laisha HOFFMAN MD   Provider Role    Dee Rodriguez RN Delivery Nurse    Korinne Kathryn Becks, RN Nursery Nurse     Resident    Trisha Mckeon RN Nursery Nurse

## 2025-05-10 NOTE — CARE PLAN
The patient's goals for the shift include to bond with baby    The clinical goals for the shift include for patient to remain hemodynamically stable    Over the shift, the patient did   Problem: Postpartum  Goal: Experiences normal postpartum course  Outcome: Progressing  Flowsheets (Taken 5/10/2025 1908)  Experiences normal postpartum course:   Monitor maternal vital signs   Assess uterine involution   Med administration/monitoring of effect   Fundal and lochia asssessments w/fundal massage, bladder emptying   Assist with identification of coping methods and supprot resources  Goal: Appropriate maternal -  bonding  Outcome: Progressing  Flowsheets (Taken 5/10/2025 1908)  Appropriate maternal- bonding:   Identify family support   24-hour rooming in   Referral to  or  as needed   Assist with identification of coping methods and support resources  Goal: No s/sx infection  Outcome: Progressing  Flowsheets (Taken 5/10/2025 1908)  No s/sx of infection:   Hygiene practices/jaeneth-care   Monitor QBL and vital signs   Fundal + lochia assessments w/fundal massage, bladder emptying, intrauterine device when indicated    make progress toward the following goals.

## 2025-05-10 NOTE — PROGRESS NOTES
Assessment    39 y.o.  at 37w5d  FHT Category 2  IOL, s/p cyto #2 @ 0054  gHTN  - neg HELLP labs   Anemia on admission - Hg 10.6    Plan      Encourage frequent position changes as tolerated  Maternal repositioning  IV fluid bolus given  Pain management per patient request  Continue assessment of maternal and fetal wellbeing  Recheck as clinically indicated by maternal or fetal status  Anticipate NSVB    Will continue maternal repositioning. Discussed next steps in IOL given cat 2 tracing. Discussed not administering 3rd dose of cytotec and not being able to start pitocin per guideline. Pt agreeable to a CRB but would like to try to rest for a little bit.  Will continue to monitor maternal and fetal status and plan to revisit CRB placement in 2 hours per pt request. Will continue with maternal repositioning.   Reviewed FHT with Dr Bales - she is aware and agrees with plan of care.     BETTYE Ramon-CNM    Subjective:  Leonila Raymundo is resting in bed. Feeling slight discomfort with contractions but coping well. VSS.   Reviewed FHT  - pt experiencing intermittent episodes of cat 2 tracing due to variable decels that improve with pt repositioning. Some periods of tachysystole/uterine irritability.   RN at bedside repositioning pt.     Objective:  Fetal Monitoring      Baseline FHR: 135 per minute  Variability: moderate  Accelerations: yes  Decelerations: variable  TOCO: Contraction Frequency: 2-3    Cervical Exam: 0.5 cm dilated, 80 effaced, -1 station    Membrane Status: Intact               Vitals:    25 1931 25 2040 25 2330 05/10/25 0327   BP: (!) 147/90 (!) 158/100 133/79 119/66   Pulse: 67 76 62 70   Resp: 18 18 17 16   Temp: 36.6 °C (97.8 °F)  36.5 °C (97.7 °F) 36.7 °C (98.1 °F)   TempSrc: Oral  Oral Oral   SpO2: 97%   98%   Weight:       Height:

## 2025-05-10 NOTE — PROGRESS NOTES
Assessment    39 y.o.  at 37w5d  FHT Category 1  Latent labor  GBS neg   gHTN  - neg HELLP labs   Anemia on admission - Hg 10.6    Plan      Encourage frequent position changes as tolerated  Encourage ambulation as tolerated  Start/Continue pitocin per protocol  Pain management per patient request  Continue assessment of maternal and fetal wellbeing  Recheck as clinically indicated by maternal or fetal status  Anticipate active phase of labor  Anticipate NSVB    Reviewed next steps for IOL - CRB vs starting pitocin. Discussed with pt that cervix feels softer compared to last exam and pit would be an alternative to CRB, as long as baby tolerates. Pt feels unsure about CRB and would like to move forward with pitocin through shared decision making. Risks and benefits reviewed.   She would like to get up and move around.       Zaria Momin, APRN-CNM    Subjective:  Leonila Raymundo is sitting up in bed. Feeling more uncomfortable with contractions. Has not been able to rest. Agreeable to CE  VS reviewed and stable     Objective:  Fetal Monitoring      Baseline FHR: 130 per minute  Variability: moderate  Accelerations: yes  Decelerations: none  TOCO: Contraction Frequency: q3    Cervical Exam: 1 cm dilated, 80 effaced, -2 station - fetal head is better applied compared to last exam. Cervix is softer and more dilated     Membrane Status: Intact             Pitocin is at  .    Vitals:    25 1931 25 2040 25 2330 05/10/25 0327   BP: (!) 147/90 (!) 158/100 133/79 119/66   Pulse: 67 76 62 70   Resp: 18 18 17 16   Temp: 36.6 °C (97.8 °F)  36.5 °C (97.7 °F) 36.7 °C (98.1 °F)   TempSrc: Oral  Oral Oral   SpO2: 97%   98%   Weight:       Height:

## 2025-05-10 NOTE — ANESTHESIA PREPROCEDURE EVALUATION
Patient: Leonila Raymundo    Evaluation Method: In-person visit    Procedure Information    Date: 05/10/25  Procedure: Labor Consult         Relevant Problems   GYN   (+) 37 weeks gestation of pregnancy ([facility])       Clinical information reviewed:   Tobacco  Allergies  Meds   Med Hx  Surg Hx   Fam Hx          NPO Detail:  NPO/Void Status  Date of Last Liquid: 25  Time of Last Liquid: 1800  Date of Last Solid: 25  Time of Last Solid: 1230         OB/Gyn Evaluation    Present Pregnancy    Patient is pregnant now.   Obstetric History    (+) hypertensive disorder of pregnancy - gestational hypertension              Physical Exam    Airway  Mallampati: II  TM distance: <3 FB  Mouth openin finger widths     Cardiovascular - normal exam  Rhythm: regular  Rate: normal     Dental - normal exam     Pulmonary - normal exam   Abdominal            Anesthesia Plan    History of general anesthesia?: no  History of complications of general anesthesia?: no    ASA 2     epidural   (I informed and discussed the risks and benefits of general and epidural anesthesia with the patient.  The patient expressed her understanding and her questions were answered.  A verbal consent was given by the patient.   )  The patient is not a current smoker.    Anesthetic plan and risks discussed with patient.  Use of blood products discussed with patient and spouse who consented to blood products.    Plan discussed with CRNA.

## 2025-05-10 NOTE — CARE PLAN
Problem: Vaginal Birth or  Section  Goal: Fetal and maternal status remain reassuring during the birth process  5/10/2025 1453 by Dee Rodriguez RN  Outcome: Met  5/10/2025 0750 by Dee Rodriguez RN  Outcome: Progressing  5/10/2025 0739 by Dee Rodriguez RN  Outcome: Progressing  Goal: Minimal s/sx of HDP and BP<160/110  5/10/2025 1453 by Dee Rodriguez RN  Outcome: Met  5/10/2025 0750 by Dee Rodriguez RN  Outcome: Progressing  5/10/2025 0739 by Dee Rodriguez RN  Outcome: Progressing  Goal: No s/sx of infection through recovery  5/10/2025 1453 by Dee Rodriguez RN  Outcome: Met  5/10/2025 0750 by Dee Rodriguez RN  Outcome: Progressing  5/10/2025 0739 by Dee Rodriguez RN  Outcome: Progressing  Goal: No s/sx of hemorrhage through recovery  5/10/2025 1453 by Dee Rodriguez RN  Outcome: Met  5/10/2025 0750 by Dee Rodriguez RN  Outcome: Progressing  5/10/2025 0739 by Dee Rodriguez RN  Outcome: Progressing   The patient's goals for the shift include have a healthy baby    The clinical goals for the shift include to tolerate induction of labor    Over the shift, the patient did  make progress toward the following goals.

## 2025-05-10 NOTE — PROGRESS NOTES
Assessment    39 y.o.  at 37w5d  FHT Category 1  IOL  gHTN  - neg HELLP labs   Anemia on admission - Hg 10.6    Plan      Encourage ambulation as tolerated  Pain management per patient request  Continue assessment of maternal and fetal wellbeing  Recheck as clinically indicated by maternal or fetal status  TC x 1  Cyto #2 given at this time     Zaria Momin, APRN-CNM    Subjective:  Leonila Raymundo is resting in bed. Feeling some period-like cramps. No complaints at this time. VSS. Denies HA, visual disturbances, epigastric pain.       Objective:  Fetal Monitoring      Baseline FHR: 125 per minute  Variability: moderate  Accelerations: yes  Decelerations: none  TOCO: Contraction Frequency: 0.5-4     Cervical Exam: def    Membrane Status: Intact                 Vitals:    25 1841 25 1931 25 2040 25 2330   BP: 131/87 (!) 147/90 (!) 158/100 133/79   Pulse: 72 67 76 62   Resp:  18 18 17   Temp:  36.6 °C (97.8 °F)  36.5 °C (97.7 °F)   TempSrc:  Oral  Oral   SpO2:  97%     Weight:       Height:

## 2025-05-10 NOTE — PROGRESS NOTES
SERGEY update--bedside visit; care assumed from Zaria Momin CNM; patient relatively comfortable but states contractions painful and have gotten worse; FHR right now is Category I but had shown variable decelerations (vs loss of contact) in last hour or so but possible that her position of high-fowlers was leading to loss of contact of FHR transducer with strong contractions; I discussed this with patient at bedside and encouraged her to sit back a little bit to allow more continuous recording; she readily accepts this advise; order placed previously to initiate pitocin but will hold that for now and observe/recheck in next hour or so; patient accepts this as well; BP's a mixture of normotensive and mild range since arrival--reviewed with patient that is is indication for induction and delivery in order to hopefully prevent potentially severe consequences of hypertension in pregnancy. All patient questions answered. Status reviewed with Dr You.

## 2025-05-10 NOTE — H&P
OB Triage H&P    Assessment/Plan    Leonila Raymundo is a 39 y.o.  at 37w4d, ASHANTI: 2025, by Last Menstrual Period, who presents to triage with an elevated BP in the office at her B[facility] visit today.    Plan    -Serial BPs  -HELLP labs  -Fetal monitoring reassuring  -Good fetal movement  -Up to date on prenatal care    Dispo  -TBD      Pregnancy Problems (from 10/16/24 to present)       Problem Noted Diagnosed Resolved    Elevated BP without diagnosis of hypertension 2025 by JEANINE Walton  No    Priority:  Medium       37 weeks gestation of pregnancy ([facility]) 2025 by JEANINE Connolly  No    Priority:  Medium       Overview Signed 2025 10:06 AM by JEANINE Connolly   Desired provider in labor: [x] CNM  [] Physician  [x] Blood Products: [x] Yes, accepts [] No, needs counseling  [x] Initial BMI: 27.45   [x] Prenatal Labs:   [x] Cervical Cancer Screening up to date: 23 nml, hpv neg  [x] Rh status: +  [x] Genetic Screening:  rr  [x] NT US: (11-13 wks): WNL  [x] Baby ASA (if indicated): age and nulliparity  [x] Pregnancy dated by: regular LMP  [x] Anatomy US: (19-20 wks): growth at 30 weeks  [x] Federal Sterilization consent signed (if indicated): N/A  [x] 1hr GCT at 24-28wks: 104  [x] Rhogam (if indicated): n/a  [] Fetal Surveillance (if indicated):  [x] Tdap (27-32 wks, may be given up to 36 wks if initial window missed): 3/18/25  [x] Flu Vaccine: declines  [x] Breastfeeding: exclusive pumping  [] Postpartum Birth control method: undecided  [] GBS at 36 - 37 wks: collected   [x] 39 weeks discussion of IOL vs. Expectant management: expectant management up to 41.0 weeks  [x] Mode of delivery ( anticipated ): vaginal         Primigravida of advanced maternal age in third trimester ([facility]) 3/18/2025 by Vale Armijo, RN  No    Priority:  Medium       34 weeks gestation of pregnancy ([facility]) 10/16/2024 by BETTYE Vigil-SERGEY  2025 by Bhargavi Rod,  JEANINE    Overview Addendum 2025  3:46 PM by JEANINE Vigil   Desired provider in labor: [x] ALEXANDERM  [] Physician  [x] Blood Products: [x] Yes, accepts [] No, needs counseling  [x] Initial BMI: 27.45   [x] Prenatal Labs:   [x] Cervical Cancer Screening up to date: 23 nml, hpv neg  [x] Rh status: +  [x] Genetic Screening:  rr  [x] NT US: (11-13 wks): WNL  [x] Baby ASA (if indicated): age and nulliparity  [x] Pregnancy dated by: regular LMP    [x] Anatomy US: (19-20 wks): growth at 30 weeks  [] Federal Sterilization consent signed (if indicated):  [x] 1hr GCT at 24-28wks: 104  [x] Rhogam (if indicated): n/a  [] Fetal Surveillance (if indicated):  [x] Tdap (27-32 wks, may be given up to 36 wks if initial window missed): 3/18/25  [x] Flu Vaccine: declines    [x] Breastfeeding: exclusive pumping  [] Postpartum Birth control method: undecided  [] GBS at 36 - 37 wks:  [x] 39 weeks discussion of IOL vs. Expectant management: expectant management up to 41.0 weeks  [x] Mode of delivery ( anticipated ): vaginal                 Subjective   Good fetal movement.  Denies vaginal bleeding., Denies contractions., Denies leaking of fluid.  Denies unrelieved headache, visual disturbances, or RUQ pain.       Prenatal Provider: SERGEY Brown    OB History    Para Term  AB Living   1 0 0 0 0 0   SAB IAB Ectopic Multiple Live Births   0 0 0 0 0      # Outcome Date GA Lbr Dillan/2nd Weight Sex Type Anes PTL Lv   1 Current                Surgical History[1]    Social History     Tobacco Use    Smoking status: Never    Smokeless tobacco: Never   Substance Use Topics    Alcohol use: Not Currently       Allergies[2]    Prescriptions Prior to Admission[3]  Objective     Last Vitals  Temp Pulse Resp BP MAP O2 Sat   36.6 °C (97.8 °F) 67 18 (!) 147/90 (CNM informed) 113 97 %     Blood Pressures         2025  1841 2025  1931          BP: 131/87 147/90               Physical Exam  General: NAD, mood  appropriate  Cardiopulmonary: warm and well perfused, breathing comfortably on room air  Abdomen: Gravid, non-tender  Extremities: Symmetric  Speculum Exam: deferred  Cervix: deferred    Chaperone Present: Yes.  Chaperone Name/Title: SULAIMAN Ayala  Examination Chaperoned: Entire Physical Exam     Fetal Monitoring  Baseline: 125 bpm, Variability: moderate,  Accelerations: present and Decelerations: none  Uterine Activity: Irregular contractions  Interpretation: Reactive    Bedside ultrasound: No    Labs in chart were reviewed.  CBC   Recent Labs     05/09/25  1850   WBC 8.1   HGB 10.6*   HCT 31.2*        CMP   Recent Labs     05/09/25  1850      K 3.6      CO2 21   ANIONGAP 12   BUN 15   CREATININE 0.77   EGFR >90   CALCIUM 8.4*   ALBUMIN 3.3*   PROT 5.7*   ALKPHOS 105   ALT 10   AST 14   BILITOT 0.2     PCR   Recent Labs     05/09/25  1851   TPUC 15   CREATU 151.1   UTPCR 0.10      Results from last 7 days   Lab Units 05/09/25  1850   WBC AUTO x10*3/uL 8.1   HEMOGLOBIN g/dL 10.6*   HEMATOCRIT % 31.2*   PLATELETS AUTO x10*3/uL 161   AST U/L 14   ALT U/L 10   CREATININE mg/dL 0.77        Prenatal labs reviewed, not remarkable.             [1]   Past Surgical History:  Procedure Laterality Date    LIPOMA RESECTION Bilateral    [2]   Allergies  Allergen Reactions    Dextromethorphan Anaphylaxis     Fatigue; weakness   [3]   Medications Prior to Admission   Medication Sig Dispense Refill Last Dose/Taking    aspirin 81 mg EC tablet Take 2 tablets (162 mg) by mouth once daily. For prevention of hypertensive disorders of pregnancy 180 tablet 2 5/9/2025    lansoprazole (Prevacid 24Hr) 15 mg DR capsule Take 1 capsule (15 mg) by mouth once daily in the morning. Take before meals. Do not crush or chew. 30 capsule 4 5/9/2025    prenatal no115/iron/folic acid (PRENATAL 19 ORAL) Take 1 tablet by mouth once daily.   5/9/2025

## 2025-05-10 NOTE — ANESTHESIA PREPROCEDURE EVALUATION
Patient: Leonila Raymundo    Evaluation Method: In-person visit    Procedure Information    Date: 05/10/25  Procedure: Labor Consult         Relevant Problems   GYN   (+) 37 weeks gestation of pregnancy ([facility])       Clinical information reviewed:   Tobacco  Allergies  Meds   Med Hx  Surg Hx   Fam Hx          NPO Detail:  NPO/Void Status  Date of Last Liquid: 25  Time of Last Liquid: 1800  Date of Last Solid: 25  Time of Last Solid: 1230         OB/Gyn Evaluation    Present Pregnancy    Patient is pregnant now.   Obstetric History    (+) hypertensive disorder of pregnancy - gestational hypertension              Physical Exam    Airway  Mallampati: II  TM distance: <3 FB  Mouth openin finger widths     Cardiovascular - normal exam  Rhythm: regular  Rate: normal     Dental - normal exam     Pulmonary - normal exam   Abdominal            Anesthesia Plan    History of general anesthesia?: no  History of complications of general anesthesia?: no    ASA 2     epidural   (I informed and discussed the risks and benefits of general and epidural anesthesia with the patient.  The patient expressed her understanding and her questions were answered.  A verbal consent was given by the patient.   )  The patient is not a current smoker.    Anesthetic plan and risks discussed with patient.  Use of blood products discussed with patient and spouse who consented to blood products.    Plan discussed with CRNA.

## 2025-05-10 NOTE — PROGRESS NOTES
CNM update--patient still uncomfortable with contractions and is positioned in a left lateral tilt; FHR Category II for moderate variability accompanied by late decels last 2 contractions; no pitocin running for greater than 30 mins; cervix 7/100/-1 with molding noted; no amniotic sac palpated on exam; will reposition for intrauterine resuscitation; continue close observation; Dr You updated on status.

## 2025-05-10 NOTE — CARE PLAN
Problem: Vaginal Birth or  Section  Goal: Fetal and maternal status remain reassuring during the birth process  Outcome: Progressing  Flowsheets (Taken 2025)  Fetal and maternal status remain reassuring during the birth process: Monitor vital signs  Goal: Minimal s/sx of HDP and BP<160/110  Outcome: Progressing  Flowsheets (Taken 2025)  Minimal s/sx of HDP and BP <160/110: Med administration/monitoring of effect  Goal: No s/sx of infection through recovery  Outcome: Progressing  Flowsheets (Taken 2025)  No s/sx of infection through recovery: Hygiene practices/jeaneth-care  Goal: No s/sx of hemorrhage through recovery  Outcome: Progressing  Flowsheets (Taken 2025)  No s/sx of hemorrhage through recovery: Monitor QBL and vital signs

## 2025-05-10 NOTE — CARE PLAN
The patient's goals for the shift include tolerate iol    The clinical goals for the shift include tolerate iol    Over the shift, the patient did not make progress toward the following goals. Barriers to progression include ***. Recommendations to address these barriers include ***.

## 2025-05-10 NOTE — NURSING NOTE
Patient unable to lift buttocks or bend knees and ankles. Recovery complete will continue to monitor sensory status

## 2025-05-10 NOTE — SIGNIFICANT EVENT
CNM update--patient getting epidural at her request; just before epidural, EFM tracing shows a few late decelerations and pitocin (recently started) stopped by RN; will reassess cervix after epidural in place; continue close observation. Dr Kenyatta wagner.

## 2025-05-10 NOTE — SIGNIFICANT EVENT
Called to room for FHR decels  Baseline 130 BPM with moderate variability and recurrent deep late decelerations  Fluid bolus, FSE placed per CNM, making rapid progress now 8/100/0 per CNM  Normotensive, oxytocin off  Recommended subcutaneous terbutaline  FHR recovered to 130 BPM, moderate variability no decels  D/w pt will follow closely hopefully  soon discussed possibility of  delivery if recurrent decelerations.

## 2025-05-10 NOTE — SIGNIFICANT EVENT
CNM update--FHR Category 1; contractions have spaced a bit; discussed with patient my recommendation to initiate Pitocin to facilitate better contraction pattern; she verbalizes understanding and consent; anticipate active phase and .

## 2025-05-10 NOTE — ANESTHESIA PROCEDURE NOTES
Epidural Block    Patient location during procedure: OB  Start time: 5/10/2025 11:29 AM  Reason for block: labor analgesia  Staffing  Performed: CRNA   Authorized by: JORGE Marcelo    Performed by: JORGE Marcelo    Preanesthetic Checklist  Completed: patient identified, IV checked, risks and benefits discussed, surgical consent, pre-op evaluation, timeout performed and sterile techniques followed  Block Timeout  RN/Licensed healthcare professional reads aloud to the Anesthesia provider and entire team: Patient identity, procedure with side and site, patient position, and as applicable the availability of implants/special equipment/special requirements.  Patient on coagulant treatment: no  Timeout performed at: 5/10/2025 11:29 AM  Block Placement  Patient position: sitting  Prep: ChloraPrep  Sterility prep: drape, gown, mask, gloves, cap and hand  Sedation level: no sedation  Patient monitoring: blood pressure, continuous pulse oximetry and heart rate  Approach: midline  Local numbing: lidocaine 1% to skin and subcutaneous tissues  Vertebral space: lumbar  Lumbar location: L3-L4  Epidural  Loss of resistance technique: saline  Guidance: landmark technique        Needle  Needle type: Tuohy   Needle gauge: 17  Needle length: 8.9cm  Needle insertion depth: 5 cm  Catheter type: multi-orifice  Catheter size: 19 G  Catheter at skin depth: 10 cm  Catheter securement method: clear occlusive dressing and liquid medical adhesive    Test dose: lidocaine 1.5% with epinephrine 1-to-200,000  Test dose: lidocaine 1.5% with epinephrine 1-to-200,000  Test dose result: no positive test dose    PCEA  Medication concentration used: 0.2% Ropivacaine with 2 mcg/mL Fentanyl  Dose (mL): 5  Lockout (minutes): 30  1-Hour Limit (boluses/hr): 25  Basal Rate: 10        Assessment  Sensory level: other (T10 on left and T8 on right)  Block outcome: patient comfortable  Number of attempts: 1  Events: no  positive test dose  Procedure assessment: patient tolerated procedure well with no immediate complications  Additional Notes  Pt acknowledges that  the risks and benefits of the epidural placement has been explained, no questions.   11:46: 2ml of 1.5% Lidocaine with 1:200 K Epi  Pt in LUL after epidural placement.

## 2025-05-10 NOTE — PROGRESS NOTES
Intrapartum Progress Note    Assessment/Plan   Leonila Raymundo is a 39 y.o.  at 37w4d. ASHANTI: 2025, by Last Menstrual Period.     Will admit to L&D for IOL in the setting of gHTN  Discussed methods of IOL - will begin cervical ripening with cytotec and update plan of care as needed.   Pain mgt per pt request   Movement encouraged   Will continue to monitor maternal and fetal status   Dr Bales updated       Assessment & Plan  Gestational hypertension, unspecified trimester ([facility])    Elevated BP without diagnosis of hypertension    Pregnancy Problems (from 10/16/24 to present)       Problem Noted Diagnosed Resolved    Elevated BP without diagnosis of hypertension 2025 by JEANINE Walton  No    Priority:  Medium       Gestational hypertension, unspecified trimester ([facility]) 2025 by JEANINE Ramon  No    Priority:  Medium       37 weeks gestation of pregnancy ([facility]) 2025 by JEANINE Connolly  No    Priority:  Medium       Overview Signed 2025 10:06 AM by JEANINE Connolly   Desired provider in labor: [x] CNM  [] Physician  [x] Blood Products: [x] Yes, accepts [] No, needs counseling  [x] Initial BMI: 27.45   [x] Prenatal Labs:   [x] Cervical Cancer Screening up to date: 23 nml, hpv neg  [x] Rh status: +  [x] Genetic Screening:  rr  [x] NT US: (11-13 wks): WNL  [x] Baby ASA (if indicated): age and nulliparity  [x] Pregnancy dated by: regular LMP  [x] Anatomy US: (19-20 wks): growth at 30 weeks  [x] Federal Sterilization consent signed (if indicated): N/A  [x] 1hr GCT at 24-28wks: 104  [x] Rhogam (if indicated): n/a  [] Fetal Surveillance (if indicated):  [x] Tdap (27-32 wks, may be given up to 36 wks if initial window missed): 3/18/25  [x] Flu Vaccine: declines  [x] Breastfeeding: exclusive pumping  [] Postpartum Birth control method: undecided  [] GBS at 36 - 37 wks: collected   [x] 39 weeks discussion of IOL vs. Expectant management: expectant  management up to 41.0 weeks  [x] Mode of delivery ( anticipated ): vaginal         Primigravida of advanced maternal age in third trimester ([facility]) 3/18/2025 by Vale Armijo RN  No    Priority:  Medium                                 Subjective   Pt meets criteria for gHTN based on 4 hour blood pressure recheck at 2040. Discussed with pt and her partner. She is agreeable to admission and IOL. Neg HELLP labs reviewed.   Pt denies HA, visual disturbances, abd discomfort.    Objective   Last Vitals:  Temp Pulse Resp BP MAP Pulse Ox   (P) 36.5 °C (97.7 °F) 62 (P) 17 133/79 102 97 %     Vitals Min/Max Last 24 Hours:  Temp  Min: 36.4 °C (97.5 °F)  Max: 36.6 °C (97.8 °F)  Pulse  Min: 62  Max: 76  Resp  Min: 16  Max: 18  BP  Min: 131/87  Max: 158/100  MAP (mmHg)  Min: 102  Max: 122    Intake/Output:  No intake or output data in the 24 hours ending 05/09/25 5472    Physical Examination:  GENERAL: Examination reveals a well developed, well nourished, gravid female in no acute distress and whose affect is appropriate. She is alert and cooperative.  HEENT: PERRLA. External ears normal. Nose normal, no erythema or discharge. Mouth and throat clear.  LUNGS: normal resp effort   ABDOMEN: soft, gravid, nontender, nondistended, no abnormal masses, no epigastric pain, fundus soft, nontender, size equal dates, FHT present  FHR is 130 , with Accelerations, no decels, moderate viability and a cat 1  tracing.    Zapata reading:  irregular   The fetus is in a vertex presentation, determined by ultrasound and Leopold's maneuver  Current Estimated Fetal Weight 6.5/7# established by Leopold's maneuver  VAGINA: normal appearing vagina with normal color and discharge and no lesions noted  CERVIX: Closed cm dilated, 60 % effaced, -3 station; MEMBRANES are Intact  EXTREMITIES: no redness or tenderness in the calves or thighs, no edema, no limitation in range of motion  SKIN: normal coloration and turgor, no rashes  PSYCHOLOGICAL: awake and  alert; oriented to person, place, and time    Chaperone Present: Yes.  Chaperone Name/Title: Lucy Hare RN  Examination Chaperoned: Entire Physical Exam    Lab Review:  Labs in chart were reviewed.  Lab Results   Component Value Date    ABO A 05/09/2025    LABRH POS 05/09/2025    ABSCRN NEG 05/09/2025     Lab Results   Component Value Date    WBC 8.1 05/09/2025    HGB 10.6 (L) 05/09/2025    HCT 31.2 (L) 05/09/2025     05/09/2025     0   Lab Value Date/Time    GRPBSTREP SEE NOTE 05/02/2025 1051     Lab Results   Component Value Date    GLUCOSE 82 05/09/2025     05/09/2025    K 3.6 05/09/2025     05/09/2025    CO2 21 05/09/2025    ANIONGAP 12 05/09/2025    BUN 15 05/09/2025    CREATININE 0.77 05/09/2025    EGFR >90 05/09/2025    CALCIUM 8.4 (L) 05/09/2025    ALBUMIN 3.3 (L) 05/09/2025    PROT 5.7 (L) 05/09/2025    ALKPHOS 105 05/09/2025    ALT 10 05/09/2025    AST 14 05/09/2025    BILITOT 0.2 05/09/2025     0   Lab Value Date/Time    UTPCR 0.10 05/09/2025 1851

## 2025-05-10 NOTE — SIGNIFICANT EVENT
CNM update--at bedside for repeated decelerations; at 1228, I inserted FSE with patient consent; Dr You present as well; patient turned with assistance to hands/knees after Terbutaline 0.25mg subcutaneous; discussed  with patient including expected action and benefits; she accepts this; now at 1253, FHR is normal range with moderate variability; cervix with FSE placement 8/100/0; continue to anticipate progress and .

## 2025-05-11 VITALS
DIASTOLIC BLOOD PRESSURE: 90 MMHG | BODY MASS INDEX: 31.33 KG/M2 | WEIGHT: 183.53 LBS | HEART RATE: 61 BPM | SYSTOLIC BLOOD PRESSURE: 150 MMHG | RESPIRATION RATE: 18 BRPM | HEIGHT: 64 IN | TEMPERATURE: 97.7 F | OXYGEN SATURATION: 99 %

## 2025-05-11 LAB — TREPONEMA PALLIDUM IGG+IGM AB [PRESENCE] IN SERUM OR PLASMA BY IMMUNOASSAY: NONREACTIVE

## 2025-05-11 PROCEDURE — 1220000001 HC OB SEMI-PRIVATE ROOM DAILY

## 2025-05-11 PROCEDURE — 2500000001 HC RX 250 WO HCPCS SELF ADMINISTERED DRUGS (ALT 637 FOR MEDICARE OP): Performed by: OBSTETRICS & GYNECOLOGY

## 2025-05-11 PROCEDURE — 2500000001 HC RX 250 WO HCPCS SELF ADMINISTERED DRUGS (ALT 637 FOR MEDICARE OP): Performed by: ADVANCED PRACTICE MIDWIFE

## 2025-05-11 RX ORDER — CALC/MAG/B COMPLEX/D3/HERB 61
15 TABLET ORAL
Status: DISCONTINUED | OUTPATIENT
Start: 2025-05-11 | End: 2025-05-11

## 2025-05-11 RX ORDER — PANTOPRAZOLE SODIUM 40 MG/1
40 TABLET, DELAYED RELEASE ORAL
Status: DISCONTINUED | OUTPATIENT
Start: 2025-05-11 | End: 2025-05-12 | Stop reason: HOSPADM

## 2025-05-11 RX ADMIN — ACETAMINOPHEN 975 MG: 325 TABLET ORAL at 11:56

## 2025-05-11 RX ADMIN — IBUPROFEN 600 MG: 600 TABLET ORAL at 00:37

## 2025-05-11 RX ADMIN — ACETAMINOPHEN 975 MG: 325 TABLET ORAL at 00:37

## 2025-05-11 RX ADMIN — ACETAMINOPHEN 975 MG: 325 TABLET ORAL at 06:21

## 2025-05-11 RX ADMIN — IBUPROFEN 600 MG: 600 TABLET ORAL at 11:56

## 2025-05-11 RX ADMIN — IBUPROFEN 600 MG: 600 TABLET ORAL at 06:21

## 2025-05-11 RX ADMIN — PANTOPRAZOLE SODIUM 40 MG: 40 TABLET, DELAYED RELEASE ORAL at 06:21

## 2025-05-11 RX ADMIN — ACETAMINOPHEN 975 MG: 325 TABLET ORAL at 17:25

## 2025-05-11 RX ADMIN — IBUPROFEN 600 MG: 600 TABLET ORAL at 17:25

## 2025-05-11 ASSESSMENT — PAIN SCALES - GENERAL
PAIN_LEVEL: 0
PAINLEVEL_OUTOF10: 0 - NO PAIN
PAINLEVEL_OUTOF10: 1
PAINLEVEL_OUTOF10: 0 - NO PAIN

## 2025-05-11 NOTE — CARE PLAN
The patient's goals for the shift include bonding with     The clinical goals for the shift include vitals and assessments WNL    Over the shift, the patient met the above goals.       Problem: Postpartum  Goal: Experiences normal postpartum course  Outcome: Progressing  Goal: Appropriate maternal -  bonding  Outcome: Progressing  Goal: No s/sx infection  Outcome: Progressing

## 2025-05-11 NOTE — ANESTHESIA POSTPROCEDURE EVALUATION
Patient: Leonila Raymundo    Procedure Summary       Date: 05/10/25 Room / Location:     Anesthesia Start: 1129 Anesthesia Stop: 1350    Procedure: Labor Analgesia Diagnosis:     Scheduled Providers:  Responsible Provider: JORGE Marcelo    Anesthesia Type: epidural ASA Status: 2            Anesthesia Type: epidural    Vitals Value Taken Time     05/11/25 07:02     05/11/25 07:02     05/11/25 07:02     05/11/25 07:02     05/11/25 07:02   Epidural catheter removed by nursing. No redness, swelling, or drainage at puncture site.    Complete resolution of numbness. Patient is able to lift legs, bend at the knees, and ambulate.    Patient denies problems with urination.    Patient denies nausea, headache or severe back pain.     Anesthesia Post Evaluation    Patient location during evaluation: bedside  Patient participation: complete - patient cannot participate  Level of consciousness: awake and alert  Pain score: 0  Pain management: adequate  Multimodal analgesia pain management approach  Airway patency: patent  Cardiovascular status: acceptable  Respiratory status: acceptable  Hydration status: acceptable  Postoperative Nausea and Vomiting: none  Comments: Epidural catheter removed by nursing. No redness, swelling, or drainage at puncture site.    Complete resolution of numbness. Patient is able to lift legs, bend at the knees, and ambulate.    Patient denies problems with urination.    Patient denies nausea, headache or severe back pain.         No notable events documented.

## 2025-05-11 NOTE — PROGRESS NOTES
Postpartum Progress Note    Assessment/Plan   Leonila Raymundo is a 39 y.o., , who delivered at 37w5d gestation and is now postpartum day 1.    PP involution progressing  Afebrile and normotensive today  Discussed recommendation to stay until day 3 in the setting of gHTN in case of BP spikes  All patient questions answered    Assessment & Plan  Gestational hypertension, unspecified trimester ([facility])    Elevated BP without diagnosis of hypertension    Pregnancy Problems (from 10/16/24 to present)       Problem Noted Diagnosed Resolved    Elevated BP without diagnosis of hypertension 2025 by JEANINE Walton  No    Priority:  Medium       Gestational hypertension, unspecified trimester ([facility]) 2025 by JEANINE Ramon  No    Priority:  Medium       37 weeks gestation of pregnancy ([facility]) 2025 by JEANINE Connolly  No    Priority:  Medium       Overview Signed 2025 10:06 AM by JEANINE Connolly   Desired provider in labor: [x] CNM  [] Physician  [x] Blood Products: [x] Yes, accepts [] No, needs counseling  [x] Initial BMI: 27.45   [x] Prenatal Labs:   [x] Cervical Cancer Screening up to date: 23 nml, hpv neg  [x] Rh status: +  [x] Genetic Screening:  rr  [x] NT US: (11-13 wks): WNL  [x] Baby ASA (if indicated): age and nulliparity  [x] Pregnancy dated by: regular LMP  [x] Anatomy US: (19-20 wks): growth at 30 weeks  [x] Federal Sterilization consent signed (if indicated): N/A  [x] 1hr GCT at 24-28wks: 104  [x] Rhogam (if indicated): n/a  [] Fetal Surveillance (if indicated):  [x] Tdap (27-32 wks, may be given up to 36 wks if initial window missed): 3/18/25  [x] Flu Vaccine: declines  [x] Breastfeeding: exclusive pumping  [] Postpartum Birth control method: undecided  [] GBS at 36 - 37 wks: collected   [x] 39 weeks discussion of IOL vs. Expectant management: expectant management up to 41.0 weeks  [x] Mode of delivery ( anticipated ): vaginal          Primigravida of advanced maternal age in third trimester ([facility]) 3/18/2025 by Vale Armijo, RN  No    Priority:  Medium       34 weeks gestation of pregnancy ([facility]) 10/16/2024 by JEANINE Vigil  5/2/2025 by JEANINE Connolly    Priority:  Medium       Overview Addendum 4/4/2025  3:46 PM by JEANINE Vigil   Desired provider in labor: [x] CNM  [] Physician  [x] Blood Products: [x] Yes, accepts [] No, needs counseling  [x] Initial BMI: 27.45   [x] Prenatal Labs:   [x] Cervical Cancer Screening up to date: 12/11/23 nml, hpv neg  [x] Rh status: +  [x] Genetic Screening:  rr  [x] NT US: (11-13 wks): WNL  [x] Baby ASA (if indicated): age and nulliparity  [x] Pregnancy dated by: regular LMP    [x] Anatomy US: (19-20 wks): growth at 30 weeks  [] Federal Sterilization consent signed (if indicated):  [x] 1hr GCT at 24-28wks: 104  [x] Rhogam (if indicated): n/a  [] Fetal Surveillance (if indicated):  [x] Tdap (27-32 wks, may be given up to 36 wks if initial window missed): 3/18/25  [x] Flu Vaccine: declines    [x] Breastfeeding: exclusive pumping  [] Postpartum Birth control method: undecided  [] GBS at 36 - 37 wks:  [x] 39 weeks discussion of IOL vs. Expectant management: expectant management up to 41.0 weeks  [x] Mode of delivery ( anticipated ): vaginal               Hospital course: gestational hypertension      Subjective   Her pain is well controlled with current medications  She is passing flatus  She is ambulating well  She is tolerating a Adult diet Regular  She reports no breast or nursing problems and understands baby may take longer to learn to feed because of being a  little early  She denies emotional concerns today   Her plan for contraception is undecided at this time     Will discuss contraception with primary OB Provider    Objective   Allergies:   Dextromethorphan         Last Vitals:  Temp Pulse Resp BP MAP Pulse Ox   37.1 °C (98.7 °F) 89 18 138/81 100 97 %      Vitals Min/Max Last 24 Hours:  Temp  Min: 36.6 °C (97.9 °F)  Max: 37.1 °C (98.7 °F)  Pulse  Min: 59  Max: 129  Resp  Min: 14  Max: 20  BP  Min: 121/76  Max: 154/89  MAP (mmHg)  Min: 83  Max: 116    Intake/Output:     Intake/Output Summary (Last 24 hours) at 5/11/2025 1116  Last data filed at 5/10/2025 2100  Gross per 24 hour   Intake --   Output 1485 ml   Net -1485 ml       Physical Exam:  General: Examination reveals a well developed, well nourished, female, in no acute distress. She is alert and cooperative.  HEENT: PERRLA. External ears normal. Nose normal, no erythema or discharge. Mouth and throat clear.  Lungs: unlabored breathing on room air.  Breasts: breasts appear normal for pregnancy, no suspicious masses, no skin or nipple changes or axillary nodes.  Fundus: firm, below umbilicus, and nontender.  Perineum: well healing.  Extremities: no redness or tenderness in the calves or thighs, no edema.  Psychological: awake and alert; oriented to person, place, and time.    Chaperone Present: Declined.  Chaperone Name/Title: multiple family members present visiting  Examination Chaperoned: bedside rounds    Lab Data:  Labs in chart were reviewed.

## 2025-05-12 VITALS
TEMPERATURE: 98.1 F | HEART RATE: 71 BPM | RESPIRATION RATE: 16 BRPM | HEIGHT: 64 IN | SYSTOLIC BLOOD PRESSURE: 130 MMHG | DIASTOLIC BLOOD PRESSURE: 90 MMHG | WEIGHT: 183.53 LBS | BODY MASS INDEX: 31.33 KG/M2 | OXYGEN SATURATION: 99 %

## 2025-05-12 LAB — HOLD SPECIMEN: NORMAL

## 2025-05-12 PROCEDURE — 2500000001 HC RX 250 WO HCPCS SELF ADMINISTERED DRUGS (ALT 637 FOR MEDICARE OP): Performed by: OBSTETRICS & GYNECOLOGY

## 2025-05-12 PROCEDURE — 2500000001 HC RX 250 WO HCPCS SELF ADMINISTERED DRUGS (ALT 637 FOR MEDICARE OP): Performed by: ADVANCED PRACTICE MIDWIFE

## 2025-05-12 PROCEDURE — 2500000004 HC RX 250 GENERAL PHARMACY W/ HCPCS (ALT 636 FOR OP/ED): Performed by: ADVANCED PRACTICE MIDWIFE

## 2025-05-12 RX ADMIN — ACETAMINOPHEN 975 MG: 325 TABLET ORAL at 12:30

## 2025-05-12 RX ADMIN — IBUPROFEN 600 MG: 600 TABLET ORAL at 00:27

## 2025-05-12 RX ADMIN — ACETAMINOPHEN 975 MG: 325 TABLET ORAL at 00:27

## 2025-05-12 RX ADMIN — ACETAMINOPHEN 975 MG: 325 TABLET ORAL at 06:10

## 2025-05-12 RX ADMIN — IBUPROFEN 600 MG: 600 TABLET ORAL at 12:30

## 2025-05-12 RX ADMIN — IBUPROFEN 600 MG: 600 TABLET ORAL at 06:10

## 2025-05-12 RX ADMIN — POLYETHYLENE GLYCOL 3350 17 G: 17 POWDER, FOR SOLUTION ORAL at 09:56

## 2025-05-12 RX ADMIN — ENOXAPARIN SODIUM 40 MG: 40 INJECTION SUBCUTANEOUS at 06:10

## 2025-05-12 RX ADMIN — PANTOPRAZOLE SODIUM 40 MG: 40 TABLET, DELAYED RELEASE ORAL at 06:10

## 2025-05-12 ASSESSMENT — PAIN SCALES - GENERAL
PAINLEVEL_OUTOF10: 0 - NO PAIN

## 2025-05-12 NOTE — ANESTHESIA POSTPROCEDURE EVALUATION
"Patient: Leonila Raymundo    Procedure Summary       Date: 05/10/25 Room / Location:     Anesthesia Start: 1129 Anesthesia Stop: 1350    Procedure: Labor Analgesia Diagnosis:     Scheduled Providers:  Responsible Provider: JORGE Marcelo    Anesthesia Type: epidural ASA Status: 2            Anesthesia Type: epidural    /90   Pulse 61   Temp 36.5 °C (97.7 °F) (Oral)   Resp 18   Ht 1.626 m (5' 4\")   Wt 83.3 kg (183 lb 8.5 oz)   LMP 08/19/2024 (Exact Date)   SpO2 99%   Breastfeeding No   BMI 31.50 kg/m²        Anesthesia Post Evaluation    Patient location during evaluation: bedside  Patient participation: complete - patient participated  Level of consciousness: awake and alert  Pain management: adequate  Airway patency: patent  Cardiovascular status: acceptable  Respiratory status: acceptable and room air  Hydration status: acceptable  Postoperative Nausea and Vomiting: none  Comments: Epidural catheter removed by nursing. No redness, swelling, or drainage at puncture site.    Complete resolution of numbness. Patient is able to lift legs, bend at the knees, and ambulate.    Patient denies problems with urination.    Patient denies nausea, headache or severe back pain.         No notable events documented.    "

## 2025-05-12 NOTE — CARE PLAN
The patient's goals for the shift include bonding with     The clinical goals for the shift include vitals and assessments WNL      Problem: Postpartum  Goal: Experiences normal postpartum course  Outcome: Progressing  Flowsheets (Taken 5/10/2025 1908 by Dee Rodriguez, RN)  Experiences normal postpartum course:   Monitor maternal vital signs   Assess uterine involution   Med administration/monitoring of effect   Fundal and lochia asssessments w/fundal massage, bladder emptying   Assist with identification of coping methods and supprot resources  Goal: Appropriate maternal -  bonding  Outcome: Progressing  Flowsheets (Taken 5/10/2025 1908 by Dee Rodriguez, RN)  Appropriate maternal- bonding:   Identify family support   24-hour rooming in   Referral to  or  as needed   Assist with identification of coping methods and support resources  Goal: No s/sx infection  Outcome: Progressing  Flowsheets (Taken 5/10/2025 1908 by Dee Rodriguez, RN)  No s/sx of infection:   Hygiene practices/jeaneth-care   Monitor QBL and vital signs   Fundal + lochia assessments w/fundal massage, bladder emptying, intrauterine device when indicated

## 2025-05-12 NOTE — NURSING NOTE
Written and verbal discharge instructions given , post birth warning signs reviewed , b/p  log  given and explained, pt will  her b/p cuff and make 1 week follow up appt for b/p checkn and 6 week check up. Mom verbalizes understanding of all instructions and importance of follow up care for self and baby.

## 2025-05-12 NOTE — CARE PLAN
The patient's goals for the shift include bond with baby    The clinical goals for the shift include wnl b/ps    Over the shift, the patient did  make progress toward the following goals.

## 2025-05-12 NOTE — DISCHARGE SUMMARY
Discharge Summary    Admission Date: 5/9/2025  Discharge Date: 5/12/2025    Discharge Diagnosis  Gestational hypertension, unspecified trimester ([facility])    Hospital Course  Delivery Date: 5/10/2025 1:50 PM  Delivery type: Vaginal, Vacuum (Extractor)   GA at delivery: 37w5d  Outcome: Living  Anesthesia during delivery: Epidural  Intrapartum complications: Fetal Intolerance  Feeding method: Breastfeeding Status: Unknown      Pertinent Physical Exam At Time of Discharge  See daily progress note    Last Vitals:  Temp Pulse Resp BP MAP Pulse Ox   36.7 °C (98.1 °F) 71 16 130/90 69 99 %     Discharge Meds     Your medication list        CONTINUE taking these medications        Instructions Last Dose Given Next Dose Due   aspirin 81 mg EC tablet      Take 2 tablets (162 mg) by mouth once daily. For prevention of hypertensive disorders of pregnancy       lansoprazole 15 mg DR capsule  Commonly known as: Prevacid 24Hr      Take 1 capsule (15 mg) by mouth once daily in the morning. Take before meals. Do not crush or chew.       PRENATAL 19 ORAL                     Complications Requiring Follow-Up  HDP    Test Results Pending At Discharge  Pending Labs       Order Current Status    Surgical Pathology Exam - PLACENTA In process            Outpatient Follow-Up  Future Appointments   Date Time Provider Department Center   5/16/2025  3:20 PM JEANINE Vigil [id number] Tommy PEREZ spent >30 minutes in the professional and overall care of this patient.      JEANINE Connolly

## 2025-05-12 NOTE — PROGRESS NOTES
Postpartum Progress Note    Assessment/Plan   Leonila Raymundo is a 39 y.o., , who delivered at 37w5d gestation and is now postpartum day 2.    A:  PPD2      Left vaginal laceration      HDP      Bottle feeding      Antepartum anemia    P: Reviewed vitals, labs      Tylenol/motrin as directed for discomforts      Recommended iron rich foods or supplementation to rebuild stores      Counseled routine discharge instructions with emphasis placed on infection, bleeding, HDP      Discussed BP checks BID. Cuff provided from unit      Has visit already scheduled this Friday for BP F/U      Aware of recommended 72 hour stay post HDP diagnosis, understands and desires discharge home today          Assessment & Plan  Gestational hypertension, unspecified trimester ([facility])    Elevated BP without diagnosis of hypertension    Pregnancy Problems (from 10/16/24 to present)       Problem Noted Diagnosed Resolved    Elevated BP without diagnosis of hypertension 2025 by JEANINE Walton  No    Priority:  Medium       Gestational hypertension, unspecified trimester ([facility]) 2025 by JEANINE Ramon  No    Priority:  Medium       37 weeks gestation of pregnancy ([facility]) 2025 by JEANINE Connolly  No    Priority:  Medium       Overview Signed 2025 10:06 AM by JEANINE Connolly   Desired provider in labor: [x] CNM  [] Physician  [x] Blood Products: [x] Yes, accepts [] No, needs counseling  [x] Initial BMI: 27.45   [x] Prenatal Labs:   [x] Cervical Cancer Screening up to date: 23 nml, hpv neg  [x] Rh status: +  [x] Genetic Screening:  rr  [x] NT US: (11-13 wks): WNL  [x] Baby ASA (if indicated): age and nulliparity  [x] Pregnancy dated by: regular LMP  [x] Anatomy US: (19-20 wks): growth at 30 weeks  [x] Federal Sterilization consent signed (if indicated): N/A  [x] 1hr GCT at 24-28wks: 104  [x] Rhogam (if indicated): n/a  [] Fetal Surveillance (if indicated):  [x] Tdap  (27-32 wks, may be given up to 36 wks if initial window missed): 3/18/25  [x] Flu Vaccine: declines  [x] Breastfeeding: exclusive pumping  [] Postpartum Birth control method: undecided  [] GBS at 36 - 37 wks: collected 5/2  [x] 39 weeks discussion of IOL vs. Expectant management: expectant management up to 41.0 weeks  [x] Mode of delivery ( anticipated ): vaginal         Primigravida of advanced maternal age in third trimester ([facility]) 3/18/2025 by Vale Armijo RN  No    Priority:  Medium       34 weeks gestation of pregnancy ([facility]) 10/16/2024 by JEANINE Vigil  5/2/2025 by JEANINE Connolly    Priority:  Medium       Overview Addendum 4/4/2025  3:46 PM by JEANINE Vigil   Desired provider in labor: [x] CNM  [] Physician  [x] Blood Products: [x] Yes, accepts [] No, needs counseling  [x] Initial BMI: 27.45   [x] Prenatal Labs:   [x] Cervical Cancer Screening up to date: 12/11/23 nml, hpv neg  [x] Rh status: +  [x] Genetic Screening:  rr  [x] NT US: (11-13 wks): WNL  [x] Baby ASA (if indicated): age and nulliparity  [x] Pregnancy dated by: regular LMP    [x] Anatomy US: (19-20 wks): growth at 30 weeks  [] Federal Sterilization consent signed (if indicated):  [x] 1hr GCT at 24-28wks: 104  [x] Rhogam (if indicated): n/a  [] Fetal Surveillance (if indicated):  [x] Tdap (27-32 wks, may be given up to 36 wks if initial window missed): 3/18/25  [x] Flu Vaccine: declines    [x] Breastfeeding: exclusive pumping  [] Postpartum Birth control method: undecided  [] GBS at 36 - 37 wks:  [x] 39 weeks discussion of IOL vs. Expectant management: expectant management up to 41.0 weeks  [x] Mode of delivery ( anticipated ): vaginal               Hospital course: gestational hypertension  Vaginal Birth   The patient's blood type is A POS. Rhogam is not indicated.    Subjective   Her pain is well controlled with current medications  She is passing flatus  She is ambulating well  She is tolerating  a Adult diet Regular  She reports no breast or nursing problems  She denies emotional concerns today   Her plan for contraception is none  Strongly desires discharge home today  Denies s/s advancing HDP    Objective   Allergies:   Dextromethorphan         Last Vitals:  Temp Pulse Resp BP MAP Pulse Ox   36.7 °C (98.1 °F) 71 16 130/90 69 99 %     Vitals Min/Max Last 24 Hours:  Temp  Min: 36.5 °C (97.7 °F)  Max: 36.9 °C (98.4 °F)  Pulse  Min: 61  Max: 71  Resp  Min: 14  Max: 18  BP  Min: 130/90  Max: 150/90  MAP (mmHg)  Min: 69  Max: 110    Intake/Output:   No intake or output data in the 24 hours ending 05/12/25 1123    Physical Exam:  General: Examination reveals a well developed, well nourished, female, in no acute distress. She is alert and cooperative.  Fundus: firm and below umbilicus.  Perineum: perineum intact. Left vaginal laceration healing.  Extremities: no redness or tenderness in the calves or thighs, no edema.  Neurological: DTRs normal and symmetrical.  Psychological: awake and alert; oriented to person, place, and time.    Lab Data:  Labs in chart were reviewed.

## 2025-05-13 LAB
BLOOD EXPIRATION DATE: NORMAL
DISPENSE STATUS: NORMAL
PRODUCT BLOOD TYPE: 6200
PRODUCT CODE: NORMAL
UNIT ABO: NORMAL
UNIT NUMBER: NORMAL
UNIT RH: NORMAL
UNIT VOLUME: 350
XM INTEP: NORMAL

## 2025-05-16 ENCOUNTER — APPOINTMENT (OUTPATIENT)
Dept: OBSTETRICS AND GYNECOLOGY | Facility: CLINIC | Age: 40
End: 2025-05-16
Payer: COMMERCIAL

## 2025-05-16 ENCOUNTER — TELEMEDICINE (OUTPATIENT)
Dept: OBSTETRICS AND GYNECOLOGY | Facility: CLINIC | Age: 40
End: 2025-05-16
Payer: COMMERCIAL

## 2025-05-16 VITALS — SYSTOLIC BLOOD PRESSURE: 141 MMHG | DIASTOLIC BLOOD PRESSURE: 90 MMHG

## 2025-05-16 DIAGNOSIS — O13.9 GESTATIONAL HYPERTENSION, ANTEPARTUM (HHS-HCC): Primary | ICD-10-CM

## 2025-05-16 LAB
LABORATORY COMMENT REPORT: NORMAL
PATH REPORT.FINAL DX SPEC: NORMAL
PATH REPORT.GROSS SPEC: NORMAL
PATH REPORT.RELEVANT HX SPEC: NORMAL
PATH REPORT.TOTAL CANCER: NORMAL

## 2025-05-16 PROCEDURE — 3080F DIAST BP >= 90 MM HG: CPT

## 2025-05-16 PROCEDURE — 99213 OFFICE O/P EST LOW 20 MIN: CPT

## 2025-05-16 PROCEDURE — 3077F SYST BP >= 140 MM HG: CPT

## 2025-05-16 NOTE — PROGRESS NOTES
Postpartum BP Log    05/13 - AM: 132/88, PM: 126/85    05/14 - AM: 137/95, PM: 133/88    05/15 - AM: 136/95, PM: 154/75    05/16 - AM: 141/90  Patient endorses not sitting for five minutes prior to BP readings, but will do her best moving forward.   Virtual or Telephone Consent    An interactive audio and video telecommunication system which permits real time communications between the patient (at the originating site) and provider (at the distant site) was utilized to provide this telehealth service.   Verbal consent was requested and obtained from Leonila Raymundo on this date, 05/16/25 for a telehealth visit and the patient's location was confirmed at the time of the visit.    Subjective   Patient ID: Leonila Raymundo is a 39 y.o. female who presents for Postpartum Follow-up.  HPI  Virtual visit with patient today for BP follow-up. Log with 4/7 mild range BP elevations. Patient does report that she is not sitting for five minutes to BP measurements. Patient denies any HA, visual disturbances, increased swelling, upper belly pain, heartburn, SOB, or chest pain.    Patient reports pain is well controlled and bleeding light. Patient bottle feeding and reports that mild has come in. Patient inquiring on how to dry up breasts.     Review of Systems    Objective   Physical Exam  Constitutional:       Appearance: Normal appearance.   Eyes:      Conjunctiva/sclera: Conjunctivae normal.   Pulmonary:      Effort: Pulmonary effort is normal.   Neurological:      Mental Status: She is alert.   Psychiatric:         Mood and Affect: Mood normal.         Assessment/Plan   gHTN: Consulted with Dr. Morgan. Per Dr. Morgan patient not to be started on antihypertensive at this time, but to continue to monitor twice daily. Patient to send BP log to this provider on Monday/Tuesday via my chart to review.     Reviewed signs and symptoms of worsening disease process including a HA unrelieved by Tylenol, RUQ pain, vision changes, chest pain,  and increase in swelling, as well as worsening blood pressures.  Patient instructed to continue to monitor BP twice daily. Parameters discussed (Bps >/= to 150 systolic and or >/= 100 diastolic x2 or any BP elevations of >/= 160 systolic and/or >/= 110 diastolic) and patient instructed when to call.     Lactating and desires cessation:  advised on tight fitting bra, application of ice, and cold cabbage leaves to improve symptoms.    BETTYE Vigil-SERGEY 05/16/25 3:47 PM

## 2025-05-20 ENCOUNTER — DOCUMENTATION (OUTPATIENT)
Dept: OBSTETRICS AND GYNECOLOGY | Facility: CLINIC | Age: 40
End: 2025-05-20
Payer: COMMERCIAL

## 2025-05-20 NOTE — PROGRESS NOTES
Patient relayed results of most recent blood pressures. Patient continues to deny s/s.     Message from patient:   5/16 am- 141/90 pm- 142/97  5/17 am- 145/100 an hour later 149/99  5/18 am- 126/91 pm- 124/91  5/19 12:24pm 128/90     No headache, vision changes.     Consulted with Dr. Echols who recommends continuing to monitor blood pressures at this time as blood pressures seem to be down trending. Per Dr. Echols - blood pressures seem to be down trending over the last two days. If BP's go back up to 140s/90s then would start something PO.     Patient will be advised of recommendations. Patient to send over blood pressures Thursday.     BETTYE Vigil-SERGEY

## 2025-05-23 ENCOUNTER — APPOINTMENT (OUTPATIENT)
Dept: OBSTETRICS AND GYNECOLOGY | Facility: CLINIC | Age: 40
End: 2025-05-23
Payer: COMMERCIAL

## 2025-06-17 ENCOUNTER — APPOINTMENT (OUTPATIENT)
Dept: OBSTETRICS AND GYNECOLOGY | Facility: CLINIC | Age: 40
End: 2025-06-17
Payer: COMMERCIAL

## 2025-06-20 ENCOUNTER — POSTPARTUM VISIT (OUTPATIENT)
Dept: OBSTETRICS AND GYNECOLOGY | Facility: CLINIC | Age: 40
End: 2025-06-20
Payer: COMMERCIAL

## 2025-06-20 VITALS
SYSTOLIC BLOOD PRESSURE: 125 MMHG | HEART RATE: 79 BPM | HEIGHT: 64 IN | BODY MASS INDEX: 27.62 KG/M2 | WEIGHT: 161.8 LBS | DIASTOLIC BLOOD PRESSURE: 81 MMHG

## 2025-06-20 DIAGNOSIS — Z30.011 ENCOUNTER FOR INITIAL PRESCRIPTION OF CONTRACEPTIVE PILLS: ICD-10-CM

## 2025-06-20 PROBLEM — O13.9 GESTATIONAL HYPERTENSION, UNSPECIFIED TRIMESTER (HHS-HCC): Status: RESOLVED | Noted: 2025-05-09 | Resolved: 2025-06-20

## 2025-06-20 PROBLEM — Z3A.37 37 WEEKS GESTATION OF PREGNANCY (HHS-HCC): Status: RESOLVED | Noted: 2025-05-02 | Resolved: 2025-06-20

## 2025-06-20 RX ORDER — NORETHINDRONE ACETATE AND ETHINYL ESTRADIOL AND FERROUS FUMARATE 1MG-20(21)
KIT ORAL
Qty: 84 TABLET | Refills: 3 | Status: SHIPPED | OUTPATIENT
Start: 2025-06-20

## 2025-06-20 RX ORDER — NORETHINDRONE ACETATE AND ETHINYL ESTRADIOL AND FERROUS FUMARATE 1MG-20(21)
1 KIT ORAL DAILY
Qty: 84 TABLET | Refills: 3 | Status: SHIPPED | OUTPATIENT
Start: 2025-06-20 | End: 2025-06-20

## 2025-06-20 ASSESSMENT — EDINBURGH POSTNATAL DEPRESSION SCALE (EPDS)
I HAVE BEEN ABLE TO LAUGH AND SEE THE FUNNY SIDE OF THINGS: AS MUCH AS I ALWAYS COULD
I HAVE BEEN SO UNHAPPY THAT I HAVE BEEN CRYING: NO, NEVER
THINGS HAVE BEEN GETTING ON TOP OF ME: NO, MOST OF THE TIME I HAVE COPED QUITE WELL
I HAVE FELT SCARED OR PANICKY FOR NO GOOD REASON: NO, NOT MUCH
I HAVE LOOKED FORWARD WITH ENJOYMENT TO THINGS: AS MUCH AS I EVER DID
THE THOUGHT OF HARMING MYSELF HAS OCCURRED TO ME: NEVER
I HAVE BEEN ANXIOUS OR WORRIED FOR NO GOOD REASON: YES, SOMETIMES
I HAVE BEEN SO UNHAPPY THAT I HAVE HAD DIFFICULTY SLEEPING: NOT AT ALL
I HAVE FELT SAD OR MISERABLE: NO, NOT AT ALL
I HAVE BLAMED MYSELF UNNECESSARILY WHEN THINGS WENT WRONG: NOT VERY OFTEN
TOTAL SCORE: 5

## 2025-06-20 NOTE — PROGRESS NOTES
Subjective   39 y.o.  presenting for postpartum follow-up   Delivery Date: 5/10/25  Type of Delivery: Vaginal, Vacuum (Extractor)   Intrapartum complications: gHTN, fetal intolerance  Pregnancy Problems (from 10/16/24 to present)       Problem Noted Diagnosed Resolved    Elevated BP without diagnosis of hypertension 2025 by JEANINE Walton  No    Priority:  Medium       Primigravida of advanced maternal age in third trimester ([facility]) 3/18/2025 by Vale Armijo RN  No    Priority:  Medium       Gestational hypertension, unspecified trimester ([facility]) 2025 by JEANINE Ramon  2025 by JEANINE Vigil    Priority:  Medium       37 weeks gestation of pregnancy ([facility]) 2025 by JEANINE Connolly  2025 by JEANINE Vigil    Priority:  Medium       Overview Signed 2025 10:06 AM by JEANINE Connolly   Desired provider in labor: [x] CNM  [] Physician  [x] Blood Products: [x] Yes, accepts [] No, needs counseling  [x] Initial BMI: 27.45   [x] Prenatal Labs:   [x] Cervical Cancer Screening up to date: 23 nml, hpv neg  [x] Rh status: +  [x] Genetic Screening:  rr  [x] NT US: (11-13 wks): WNL  [x] Baby ASA (if indicated): age and nulliparity  [x] Pregnancy dated by: regular LMP  [x] Anatomy US: (19-20 wks): growth at 30 weeks  [x] Federal Sterilization consent signed (if indicated): N/A  [x] 1hr GCT at 24-28wks: 104  [x] Rhogam (if indicated): n/a  [] Fetal Surveillance (if indicated):  [x] Tdap (27-32 wks, may be given up to 36 wks if initial window missed): 3/18/25  [x] Flu Vaccine: declines  [x] Breastfeeding: exclusive pumping  [] Postpartum Birth control method: undecided  [] GBS at 36 - 37 wks: collected   [x] 39 weeks discussion of IOL vs. Expectant management: expectant management up to 41.0 weeks  [x] Mode of delivery ( anticipated ): vaginal         34 weeks gestation of pregnancy ([facility]) 10/16/2024 by Litzy VALLADARES  JEANINE Brown  2025 by JEANINE Connolly    Priority:  Medium       Overview Addendum 2025  3:46 PM by JEANINE Vigil   Desired provider in labor: [x] CNM  [] Physician  [x] Blood Products: [x] Yes, accepts [] No, needs counseling  [x] Initial BMI: 27.45   [x] Prenatal Labs:   [x] Cervical Cancer Screening up to date: 23 nml, hpv neg  [x] Rh status: +  [x] Genetic Screening:  rr  [x] NT US: (11-13 wks): WNL  [x] Baby ASA (if indicated): age and nulliparity  [x] Pregnancy dated by: regular LMP    [x] Anatomy US: (19-20 wks): growth at 30 weeks  [] Federal Sterilization consent signed (if indicated):  [x] 1hr GCT at 24-28wks: 104  [x] Rhogam (if indicated): n/a  [] Fetal Surveillance (if indicated):  [x] Tdap (27-32 wks, may be given up to 36 wks if initial window missed): 3/18/25  [x] Flu Vaccine: declines    [x] Breastfeeding: exclusive pumping  [] Postpartum Birth control method: undecided  [] GBS at 36 - 37 wks:  [x] 39 weeks discussion of IOL vs. Expectant management: expectant management up to 41.0 weeks  [x] Mode of delivery ( anticipated ): vaginal                   PP Recovery:   Pain: controlled  Lacerations: 2nd degree  Perineal discomfort: none  Lochia: none  Feeding Method: She is bottle feeding.   Lactation Consult Needed?: No  Birth Trauma: No  Bonding with Baby: well with baby boy, David  Sexual Intimacy: Yes  Contraceptive Method: CHC  Depression - Denies s/s depression 5.    Postpartum Depression: Medium Risk (2025)    [place]  Depression Scale     Last EPDS Total Score: 5     Last EPDS Self Harm Result: Never     Emotional Support - SO  Bowel Symptoms - Negative for abdominal discomfort, blood in stool or black stool, and negative change in bowel habits.  Bladder Symptoms -patient is reporting urinary urgency without incontinence.   Menses Since Delivery - No    Physical Exam  HENT:      Mouth/Throat:      Mouth: Mucous membranes are moist.    Eyes:      Conjunctiva/sclera: Conjunctivae normal.   Neck:      Thyroid: No thyroid mass, thyromegaly or thyroid tenderness.   Cardiovascular:      Rate and Rhythm: Normal rate and regular rhythm.      Pulses: Normal pulses.   Pulmonary:      Effort: Pulmonary effort is normal.      Breath sounds: Normal breath sounds.   Chest:   Breasts:     Breasts are symmetrical.      Right: Normal.      Left: Normal.   Abdominal:      General: Abdomen is flat.      Palpations: Abdomen is soft.      Hernia: There is no hernia in the left inguinal area or right inguinal area.   Genitourinary:     General: Normal vulva.      Uterus: Normal.       Adnexa: Right adnexa normal and left adnexa normal.      Comments: 2nd degree laceration well healed.  Musculoskeletal:         General: Normal range of motion.      Cervical back: Normal range of motion.   Lymphadenopathy:      Cervical: No cervical adenopathy.      Right cervical: No superficial, deep or posterior cervical adenopathy.     Left cervical: No superficial, deep or posterior cervical adenopathy.      Lower Body: No right inguinal adenopathy. No left inguinal adenopathy.   Skin:     General: Skin is warm.      Capillary Refill: Capillary refill takes less than 2 seconds.   Neurological:      Mental Status: She is alert and oriented to person, place, and time.   Psychiatric:         Mood and Affect: Mood normal.         Behavior: Behavior normal.          Plan      A/P. 39 y.o. now , s/p VAVB, normal recovery course  Last pap:  neg per patient through [facility]  Postpartum contraception discussed - patient elects OCPs. The risks of clotting with birth control containing estrogen was discussed with the patient. She denies a personal history of smoking, migraine with aura, blood clotting and hypertension. She denies having any relatives with a history of breast cancer, DVT or PE, and any relatives less than 50 years old with a history of MI or CVA.   Patient aware and  consents to starting this method and rx sent to pharmacy. Instructions and warning s/s provided. Will start the  after her next period, and use a back up method of contraception for one week. Patient encouraged to read information packet and be compliant with daily use.  Returning to exercise and sex discussed. Patient is cleared.   Encouraged patient to continue to monitor for signs or symptoms of  mood and anxiety disorders  Urinary urgency without incontinence: offered referral to pelvic floor PT and patient currently declines. Encouraged Kegel exercises.    Routine follow up with PCP for health maintenance examination encouraged including TSH, cholesterol and vitamin D evaluation.  Encouraged continued breastfeeding. Patient aware resources are available should she need them.   Warning s/s discussed  All questions answered    F/U , and for routine annual exam     Litzy Brown, BETTYE-[email or web address]

## 2025-06-25 ENCOUNTER — APPOINTMENT (OUTPATIENT)
Dept: OBSTETRICS AND GYNECOLOGY | Facility: CLINIC | Age: 40
End: 2025-06-25
Payer: COMMERCIAL

## 2026-06-17 ENCOUNTER — APPOINTMENT (OUTPATIENT)
Dept: OBSTETRICS AND GYNECOLOGY | Facility: CLINIC | Age: 41
End: 2026-06-17
Payer: COMMERCIAL

## (undated) DEVICE — NEEDLE HYPO 25GA L1.5IN BLU POLYPR HUB S STL REG BVL STR

## (undated) DEVICE — GOWN,AURORA,NONREINFORCED,LARGE: Brand: MEDLINE

## (undated) DEVICE — TOWEL,OR,DSP,ST,BLUE,STD,4/PK,20PK/CS: Brand: MEDLINE

## (undated) DEVICE — Z DISCONTINUED NO SUB IDED GLOVE SURG BEAD CUF 8 STD PF WHT STRL TRIUMPH LT LTX

## (undated) DEVICE — ELECTRODE PT RET AD L9FT HI MOIST COND ADH HYDRGEL CORDED

## (undated) DEVICE — SUTURE MCRYL SZ 4-0 L27IN ABSRB UD L19MM PS-2 1/2 CIR PRIM Y426H

## (undated) DEVICE — PACK,LAPAROTOMY,NO GOWNS: Brand: MEDLINE

## (undated) DEVICE — SYRINGE MED 10ML TRNSLUC BRL PLUNG BLK MRK POLYPR CTRL

## (undated) DEVICE — ELECTROSURGICAL PENCIL BUTTON SWITCH E-Z CLEAN COATED BLADE ELECTRODE 10 FT (3 M) CORD HOLSTER: Brand: MEGADYNE

## (undated) DEVICE — GOWN,AURORA,NONRNF,XL,30/CS: Brand: MEDLINE

## (undated) DEVICE — LABEL MED MINI W/ MARKER

## (undated) DEVICE — E-Z CLEAN, NON-STICK, PTFE COATED, ELECTROSURGICAL BLADE ELECTRODE, MODIFIED EXTENDED INSULATION, 2.5 INCH (6.35 CM): Brand: MEGADYNE

## (undated) DEVICE — CHLORAPREP 26ML ORANGE

## (undated) DEVICE — MARKER SURG SKIN GENTIAN VLT REG TIP W/ 6IN RUL

## (undated) DEVICE — SPONGE,LAP,18"X18",DLX,XR,ST,5/PK,40/PK: Brand: MEDLINE

## (undated) DEVICE — COUNTER NDL 40 COUNT HLD 70 FOAM BLK ADH W/ MAG

## (undated) DEVICE — INTENDED FOR TISSUE SEPARATION, AND OTHER PROCEDURES THAT REQUIRE A SHARP SURGICAL BLADE TO PUNCTURE OR CUT.: Brand: BARD-PARKER ® CARBON RIB-BACK BLADES